# Patient Record
Sex: FEMALE | Race: WHITE | NOT HISPANIC OR LATINO | Employment: OTHER | URBAN - METROPOLITAN AREA
[De-identification: names, ages, dates, MRNs, and addresses within clinical notes are randomized per-mention and may not be internally consistent; named-entity substitution may affect disease eponyms.]

---

## 2019-03-22 ENCOUNTER — OFFICE VISIT (OUTPATIENT)
Dept: NEUROLOGY | Facility: CLINIC | Age: 69
End: 2019-03-22

## 2019-03-22 VITALS — OXYGEN SATURATION: 96 % | RESPIRATION RATE: 14 BRPM | HEART RATE: 66 BPM

## 2019-03-22 DIAGNOSIS — F03.90 DEMENTIA WITHOUT BEHAVIORAL DISTURBANCE, UNSPECIFIED DEMENTIA TYPE: Primary | ICD-10-CM

## 2019-03-22 PROCEDURE — 99204 OFFICE O/P NEW MOD 45 MIN: CPT | Performed by: PSYCHIATRY & NEUROLOGY

## 2019-03-22 RX ORDER — ROPINIROLE 1 MG/1
1 TABLET, FILM COATED ORAL NIGHTLY
COMMUNITY

## 2019-03-22 RX ORDER — ASCORBIC ACID 500 MG
500 TABLET ORAL DAILY
COMMUNITY

## 2019-03-22 RX ORDER — ALLOPURINOL 100 MG/1
100 TABLET ORAL DAILY
COMMUNITY

## 2019-03-22 RX ORDER — OMEPRAZOLE 40 MG/1
40 CAPSULE, DELAYED RELEASE ORAL DAILY
COMMUNITY

## 2019-03-22 RX ORDER — LEVOTHYROXINE SODIUM 0.07 MG/1
75 TABLET ORAL DAILY
COMMUNITY

## 2019-03-22 RX ORDER — CARVEDILOL 12.5 MG/1
12.5 TABLET ORAL 2 TIMES DAILY WITH MEALS
COMMUNITY

## 2019-03-22 RX ORDER — DONEPEZIL HYDROCHLORIDE 10 MG/1
10 TABLET, FILM COATED ORAL NIGHTLY
COMMUNITY

## 2019-03-22 RX ORDER — MEMANTINE HYDROCHLORIDE 5 MG/1
5 TABLET ORAL 2 TIMES DAILY
COMMUNITY
End: 2019-03-22 | Stop reason: SDUPTHER

## 2019-03-22 RX ORDER — MELATONIN
1000 DAILY
COMMUNITY

## 2019-03-22 RX ORDER — MEMANTINE HYDROCHLORIDE 10 MG/1
10 TABLET ORAL 2 TIMES DAILY
Qty: 60 TABLET | Refills: 6 | Status: SHIPPED | OUTPATIENT
Start: 2019-03-22 | End: 2020-01-02

## 2019-03-22 RX ORDER — AMLODIPINE BESYLATE 2.5 MG/1
2.5 TABLET ORAL DAILY
COMMUNITY

## 2019-03-22 RX ORDER — CHLORAL HYDRATE 500 MG
1000 CAPSULE ORAL
COMMUNITY

## 2019-03-22 RX ORDER — FERROUS SULFATE 325(65) MG
325 TABLET ORAL
COMMUNITY

## 2019-03-22 RX ORDER — ATORVASTATIN CALCIUM 10 MG/1
10 TABLET, FILM COATED ORAL DAILY
COMMUNITY

## 2019-03-22 RX ORDER — ASPIRIN 325 MG
325 TABLET, DELAYED RELEASE (ENTERIC COATED) ORAL EVERY 6 HOURS PRN
COMMUNITY

## 2019-03-22 RX ORDER — ARIPIPRAZOLE 5 MG/1
5 TABLET ORAL DAILY
COMMUNITY

## 2019-03-22 RX ORDER — CHOLECALCIFEROL (VITAMIN D3) 125 MCG
5 CAPSULE ORAL
COMMUNITY

## 2019-03-22 NOTE — PROGRESS NOTES
Subjective     CC: Memory Loss      History of Present Illness   Rolanda Damon is a 68 y.o. female who comes to clinic today for evaluation of dementia. Her family has noted a progressive cognitive decline since at least 2016, marked by forgetfulness along with impairments in essentially all spheres of cognition and complicating delusions. She does better when busy and her confusion is worsened at night.     Prior evaluation has included screening blood work  and an MRI of the brain which were primarily notable for diffuse white matter changes. She is currently taking donepezil, memantine and Abilify.    I have reviewed and confirmed the past family, social and medical history as accurate on 3/22/19.     Review of Systems   Constitutional: Negative.    Respiratory: Negative.    Cardiovascular: Negative.    Gastrointestinal: Negative.    Genitourinary: Negative.    All other systems reviewed and are negative.      Objective   General appearance today is normal.   Peripheral pulses were present and symmetric.   The ophthalmoscopic exam today is unremarkable. The discs and posterior elements are unremarkable.    Pulse 66   Resp 14   SpO2 96%     Physical Exam   Neurological: She has normal strength. She has a normal Finger-Nose-Finger Test.   Reflex Scores:       Tricep reflexes are 1+ on the right side and 1+ on the left side.       Bicep reflexes are 1+ on the right side and 1+ on the left side.       Brachioradialis reflexes are 1+ on the right side and 1+ on the left side.       Patellar reflexes are 1+ on the right side and 1+ on the left side.       Achilles reflexes are 1+ on the right side and 1+ on the left side.  Psychiatric: Her speech is normal.        Neurologic Exam     Mental Status   Oriented to person.   Disoriented to place.   Disoriented to time.   Registration: recalls 3 of 3 objects. Recall of objects at 5 minutes: 0/3. Follows 3 step commands.   Attention: normal. Concentration: normal.   Speech:  speech is normal   Level of consciousness: alert  Knowledge: poor.   Able to name object. Able to read. Able to repeat. Able to write. Normal comprehension.     Cranial Nerves   Cranial nerves II through XII intact.     Motor Exam   Muscle bulk: normal  Overall muscle tone: normal    Strength   Strength 5/5 throughout.     Sensory Exam   Light touch normal.     Gait, Coordination, and Reflexes     Gait  Gait: (antalgic)    Coordination   Finger to nose coordination: normal    Reflexes   Right brachioradialis: 1+  Left brachioradialis: 1+  Right biceps: 1+  Left biceps: 1+  Right triceps: 1+  Left triceps: 1+  Right patellar: 1+  Left patellar: 1+  Right achilles: 1+  Left achilles: 1+      MMSE=15      Assessment/Plan   Rolanda was seen today for memory loss.    Diagnoses and all orders for this visit:    Dementia without behavioral disturbance, unspecified dementia type          DISCUSSION/SUMMARY    Rolanda Damon comes to clinic today for evaluation of Dementia . 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 Siegel, which was discussed. I discussed treatment options including increasing memantine to 10 mg bid or even increasing donepezil to 10 mg bid or switching to the Exelon patch. For now we will try increasing her memantine. She will then follow up in 3 months , or sooner if needed.     As part of this visit I reviewed outside records and obtained additional history from the family which is incorporated in the HPI. I reviewed records from Dr. Parish and [facility] confirming the history above. Please see above for additional details.

## 2019-07-12 ENCOUNTER — OFFICE VISIT (OUTPATIENT)
Dept: NEUROLOGY | Facility: CLINIC | Age: 69
End: 2019-07-12

## 2019-07-12 VITALS — HEIGHT: 62 IN | SYSTOLIC BLOOD PRESSURE: 116 MMHG | DIASTOLIC BLOOD PRESSURE: 64 MMHG

## 2019-07-12 DIAGNOSIS — F03.90 DEMENTIA WITHOUT BEHAVIORAL DISTURBANCE, UNSPECIFIED DEMENTIA TYPE: Primary | ICD-10-CM

## 2019-07-12 PROCEDURE — 99214 OFFICE O/P EST MOD 30 MIN: CPT | Performed by: PHYSICIAN ASSISTANT

## 2019-07-12 RX ORDER — BLOOD-GLUCOSE METER
1 KIT MISCELLANEOUS 3 TIMES DAILY
Refills: 5 | COMMUNITY
Start: 2019-04-18

## 2019-07-12 RX ORDER — CHLORAL HYDRATE 500 MG
CAPSULE ORAL
Refills: 5 | COMMUNITY
Start: 2019-06-25

## 2019-07-12 RX ORDER — LANOLIN ALCOHOL/MO/W.PET/CERES
CREAM (GRAM) TOPICAL
Refills: 5 | COMMUNITY
Start: 2019-04-04

## 2019-07-12 RX ORDER — GANCICLOVIR 1.5 MG/G
GEL OPHTHALMIC
COMMUNITY
Start: 2019-07-03

## 2019-07-12 RX ORDER — LANCETS 30 GAUGE
1 EACH MISCELLANEOUS 3 TIMES DAILY
Refills: 5 | COMMUNITY
Start: 2019-04-18

## 2019-07-12 RX ORDER — ERGOCALCIFEROL 1.25 MG/1
CAPSULE ORAL
Refills: 5 | COMMUNITY
Start: 2019-06-25

## 2019-07-12 RX ORDER — LINAGLIPTIN 5 MG/1
TABLET, FILM COATED ORAL
Refills: 4 | COMMUNITY
Start: 2019-06-25

## 2019-07-12 RX ORDER — ASPIRIN 325 MG
TABLET ORAL
Refills: 5 | COMMUNITY
Start: 2019-06-25 | End: 2019-07-12 | Stop reason: SDUPTHER

## 2019-07-12 NOTE — PROGRESS NOTES
"Subjective     Chief Complaint: memory loss      History of Present Illness   Rolanda Damon is a 68 y.o. female who returns to clinic today for evaluation of Dementia . Her family has noted a progressive cognitive decline since at least 2016, marked by forgetfulness along with impairments in essentially all spheres of cognition and complicating delusions. She does better when busy and her confusion is worsened at night. She is currently residing at [facility].      Prior evaluation has included screening blood work and an MRI of the brain which were primarily notable for diffuse white matter changes. She is currently taking donepezil, memantine     Today: Since her last visit in 3/19, she and her family have noted a cognitive decline. Her family also notes that she has gradually begun dragging her right foot while walking over the last 2-3 weeks. There is no clear associated weakness or additional associated neurologic symptoms.       I have reviewed and confirmed the past family, social and medical history as accurate on 7/12/19.     Review of Systems   Constitutional: Negative.    HENT: Negative.    Eyes: Negative.    Respiratory: Negative.    Cardiovascular: Negative.    Gastrointestinal: Negative.    Endocrine: Negative.    Genitourinary: Negative.    Musculoskeletal: Negative.    Skin: Negative.    Allergic/Immunologic: Negative.    Neurological:        Memory loss     Hematological: Negative.    Psychiatric/Behavioral: Negative.        Objective     /64   Ht 157.5 cm (62\")     General appearance today is normal.       Physical Exam   Neurological: She has normal strength. She has a normal Finger-Nose-Finger Test.   Psychiatric: Her speech is normal.        Neurologic Exam     Mental Status   Oriented to person.   Disoriented to place.   Disoriented to time. Oriented to season.   Registration: recalls 3 of 3 objects. Recall of objects at 5 minutes: 0/3 recall. Follows 3 step commands.   Attention: " 3/5 sequencing.   Speech: speech is normal   Level of consciousness: alert  Able to name object. Able to read. Able to repeat. Able to write. Normal comprehension.     Cranial Nerves   Cranial nerves II through XII intact.     Motor Exam   Muscle bulk: normal  Overall muscle tone: normal    Strength   Strength 5/5 throughout.     Sensory Exam   Light touch normal.     Gait, Coordination, and Reflexes     Gait  Gait: (quite unsteady)    Coordination   Finger to nose coordination: normal    Tremor   Resting tremor: absent        Results  MMSE=16      Assessment/Plan   Rolanda was seen today for memory loss.    Diagnoses and all orders for this visit:    Dementia without behavioral disturbance, unspecified dementia type          Discussion/Summary   Rolanda Damon returns to clinic today with a history of suspected VaD or a mixed dementia (VaD/AD). This was discussed in detail with the patient and her family. After discussing potential treatment options, it was elected to continue on  donepezil and memantine unchanged. She and her family will consider PT in the future. I also encouraged the patient's family to call Margo Soria, social work as needed as they are looking into a memory care facility. She will then follow up in 6 months , or sooner if needed.   I spent 25 minutes face to face with the patient and family with 20 minutes spent on discussing diagnosis, prognosis, evaluation, current status, treatment options and management as discussed above.       As part of this visit I obtained additional history from the family which is incorporated in the HPI.      Fern Velázquez PA-C

## 2020-01-02 RX ORDER — MEMANTINE HYDROCHLORIDE 10 MG/1
TABLET ORAL
Qty: 60 TABLET | Refills: 6 | Status: SHIPPED | OUTPATIENT
Start: 2020-01-02 | End: 2020-08-13

## 2020-01-02 RX ORDER — MEMANTINE HYDROCHLORIDE 10 MG/1
TABLET ORAL
Qty: 60 TABLET | Refills: 6 | Status: SHIPPED | OUTPATIENT
Start: 2020-01-02 | End: 2020-01-02

## 2020-08-13 RX ORDER — MEMANTINE HYDROCHLORIDE 10 MG/1
TABLET ORAL
Qty: 60 TABLET | Refills: 6 | Status: SHIPPED | OUTPATIENT
Start: 2020-08-13 | End: 2021-04-23 | Stop reason: SDUPTHER

## 2020-08-13 NOTE — TELEPHONE ENCOUNTER
Called and spoke to pt daughter Augusta informing that pt is needing fu visit. Augusta informed that pt is at [facility].     Called and spoke to Alibna with [facility] ([phone]). Scheduled pt fu appt [email or web address] and informed Albina of instructions of how to start visit and that pt refill will be sent to [facility] pharmacy. Albina verbalized understanding.  Thanks.

## 2020-08-28 ENCOUNTER — TELEMEDICINE (OUTPATIENT)
Dept: NEUROLOGY | Facility: CLINIC | Age: 70
End: 2020-08-28

## 2020-08-28 DIAGNOSIS — F03.90 DEMENTIA WITHOUT BEHAVIORAL DISTURBANCE, UNSPECIFIED DEMENTIA TYPE: Primary | ICD-10-CM

## 2020-08-28 PROCEDURE — 99214 OFFICE O/P EST MOD 30 MIN: CPT | Performed by: PHYSICIAN ASSISTANT

## 2020-08-28 NOTE — PROGRESS NOTES
Subjective       You have chosen to receive care through a telehealth visit.  Do you consent to use a video/audio connection for your medical care today? Yes      Chief Complaint: memory loss      History of Present Illness   Rolanda Damon is a 69 y.o. female who returns today for evaluation of Dementia. Her family has noted a progressive cognitive decline since at least 2016, marked by forgetfulness along with impairments in essentially all spheres of cognition and complicating delusions. She does better when busy and her confusion is worsened at night. She is currently residing at [facility].      Prior evaluation has included screening blood work and an MRI of the brain which were primarily notable for diffuse white matter changes. She is currently taking donepezil and memantine.     Today: Since her last visit in 7/19, she feels essentially unchanged and her family agrees. Her delusions are manageable. She has been noting neck pain for several months. She is currently participating in PT.       I have reviewed and confirmed the past family, social and medical history as accurate on 8/28/2020.     Review of Systems   Constitutional: Negative.    HENT: Negative.    Eyes: Negative.    Respiratory: Negative.    Cardiovascular: Negative.    Gastrointestinal: Negative.    Endocrine: Negative.    Genitourinary: Negative.    Musculoskeletal: Negative.    Skin: Negative.    Allergic/Immunologic: Negative.    Neurological:        Memory loss     Hematological: Negative.        Objective       Physical Exam   Psychiatric: Her speech is normal.        Neurologic Exam     Mental Status   Speech: speech is normal   Level of consciousness: alert  Normal comprehension.         Results  MMSE= 16 in 7/19       Assessment/Plan   Diagnoses and all orders for this visit:    Dementia without behavioral disturbance, unspecified dementia type (CMS/HCC)          Discussion/Summary   Rolanda Damon returns today with a history of  suspected VaD or a mixed dementia (VaD/AD). I again reviewed her current status and treatment options. After discussing potential treatment options, it was elected to continue on  donepezil and memantine unchanged. She will continue working with PT regarding her neck pain. She will then follow up in 6 months , or sooner if needed.   I spent 25 minutes on the phone with the patient, family and caregiver with 15 minutes spent on discussing diagnosis, prognosis, evaluation, current status, treatment options and management as discussed above.     This visit was performed over the phone after Doxy.me failed, with patient's consent.     As part of this visit I obtained additional history from the family which is incorporated in the HPI and obtained additional history from the caregiver which is incorporated in the HPI.      Fern Velázquez PA-C

## 2021-04-23 DIAGNOSIS — F03.90 DEMENTIA WITHOUT BEHAVIORAL DISTURBANCE, UNSPECIFIED DEMENTIA TYPE: Primary | ICD-10-CM

## 2021-04-23 RX ORDER — MEMANTINE HYDROCHLORIDE 10 MG/1
10 TABLET ORAL 2 TIMES DAILY
Qty: 60 TABLET | Refills: 6 | Status: SHIPPED | OUTPATIENT
Start: 2021-04-23

## 2021-04-23 NOTE — TELEPHONE ENCOUNTER
Caller: [facility]   Relationship: PHARMACY     Best call back number: [phone] [phone]    Medication needed:   Requested Prescriptions     Pending Prescriptions Disp Refills   • memantine (NAMENDA) 10 MG tablet 60 tablet 6     Sig: Take 1 tablet by mouth 2 (Two) Times a Day.       Does the patient have less than a 3 day supply:  [] Yes  [x] No    What is the patient's preferred pharmacy:   [facility]

## 2021-11-05 DIAGNOSIS — F03.90 DEMENTIA WITHOUT BEHAVIORAL DISTURBANCE, UNSPECIFIED DEMENTIA TYPE: ICD-10-CM

## 2021-11-05 RX ORDER — MEMANTINE HYDROCHLORIDE 10 MG/1
TABLET ORAL
Qty: 56 TABLET | Refills: 11 | OUTPATIENT
Start: 2021-11-05

## 2021-12-06 DIAGNOSIS — F03.90 DEMENTIA WITHOUT BEHAVIORAL DISTURBANCE, UNSPECIFIED DEMENTIA TYPE: ICD-10-CM

## 2021-12-06 RX ORDER — MEMANTINE HYDROCHLORIDE 10 MG/1
TABLET ORAL
Qty: 56 TABLET | Refills: 11 | OUTPATIENT
Start: 2021-12-06

## 2023-01-09 ENCOUNTER — APPOINTMENT (OUTPATIENT)
Dept: CT IMAGING | Facility: HOSPITAL | Age: 73
End: 2023-01-09
Payer: MEDICARE

## 2023-01-09 ENCOUNTER — HOSPITAL ENCOUNTER (EMERGENCY)
Facility: HOSPITAL | Age: 73
Discharge: NURSING, REHAB OR LONG-TERM CARE | End: 2023-01-09
Attending: EMERGENCY MEDICINE | Admitting: EMERGENCY MEDICINE
Payer: MEDICARE

## 2023-01-09 VITALS
HEART RATE: 81 BPM | RESPIRATION RATE: 14 BRPM | OXYGEN SATURATION: 92 % | WEIGHT: 120 LBS | TEMPERATURE: 97.9 F | DIASTOLIC BLOOD PRESSURE: 72 MMHG | HEIGHT: 62 IN | BODY MASS INDEX: 22.08 KG/M2 | SYSTOLIC BLOOD PRESSURE: 130 MMHG

## 2023-01-09 DIAGNOSIS — S09.90XA INJURY OF HEAD, INITIAL ENCOUNTER: Primary | ICD-10-CM

## 2023-01-09 DIAGNOSIS — S01.81XA LACERATION OF FOREHEAD, INITIAL ENCOUNTER: ICD-10-CM

## 2023-01-09 DIAGNOSIS — F03.C0 SEVERE DEMENTIA WITHOUT BEHAVIORAL DISTURBANCE, PSYCHOTIC DISTURBANCE, MOOD DISTURBANCE, OR ANXIETY, UNSPECIFIED DEMENTIA TYPE: ICD-10-CM

## 2023-01-09 PROCEDURE — 99284 EMERGENCY DEPT VISIT MOD MDM: CPT

## 2023-01-09 PROCEDURE — 70450 CT HEAD/BRAIN W/O DYE: CPT

## 2023-01-09 PROCEDURE — 72125 CT NECK SPINE W/O DYE: CPT

## 2023-01-09 PROCEDURE — 99283 EMERGENCY DEPT VISIT LOW MDM: CPT

## 2023-01-09 RX ORDER — LIDOCAINE HYDROCHLORIDE AND EPINEPHRINE 10; 10 MG/ML; UG/ML
10 INJECTION, SOLUTION INFILTRATION; PERINEURAL ONCE
Status: DISCONTINUED | OUTPATIENT
Start: 2023-01-09 | End: 2023-01-09

## 2023-01-09 NOTE — ED PROVIDER NOTES
Subjective   History of Present Illness    Pt presents with injuries from a fall. By report from NH she fell asleep in her wheelchair and fell forward striking head on the floor.  She has a laceration to the forehead.    Daughter in law is here with her, pt has severe dementia at baseline and is not able to participate in history.  Pt complains of neck pain but DIL says that is a longstanding complaint.  Pt denies other pain including headache, denies pain to arms or legs but I do not think her answers are reliable.  She does not know where she is.    She is not on a blood thinner. She has had numerous falls in the past year or two with fractures to both hips and right shoulder and a previous head injury.    History provided by:  Relative  History limited by:  Dementia      Review of Systems   Unable to perform ROS: Dementia       Past Medical History:   Diagnosis Date   • Breast cancer (HCC)    • Dementia (HCC)    • TIA (transient ischemic attack)        No Known Allergies    History reviewed. No pertinent surgical history.    Family History   Problem Relation Age of Onset   • Alzheimer's disease Father    • Hypertension Father    • Stroke Father        Social History     Socioeconomic History   • Marital status:    Tobacco Use   • Smoking status: Never   Substance and Sexual Activity   • Alcohol use: No           Objective   Physical Exam  Vitals and nursing note reviewed.   Constitutional:       General: She is not in acute distress.     Appearance: Normal appearance. She is not ill-appearing.   HENT:      Head: Normocephalic.      Comments: No scalp tenderness or swelling or deformity.  There is a 3 cm horizontal laceration to the forehead above the left eyebrow.  No other injuries seen.  Eyes:      General: No scleral icterus.        Right eye: No discharge.         Left eye: No discharge.      Conjunctiva/sclera: Conjunctivae normal.   Neck:      Comments: C-collar maintained pending  imaging.  Cardiovascular:      Rate and Rhythm: Normal rate.   Pulmonary:      Effort: Pulmonary effort is normal. No respiratory distress.   Musculoskeletal:         General: No swelling or deformity.      Comments: There is no tenderness to gross palpation of the arms or legs, and intact painless ROM to the shoulders, elbows, wrists, hips, knees and ankles.     Skin:     General: Skin is dry.      Findings: No rash.   Neurological:      General: No focal deficit present.      Mental Status: She is alert. Mental status is at baseline.      Comments: Demented. Speech is confused but intelligible.  Moves all fours.   Psychiatric:         Mood and Affect: Mood normal.         Behavior: Behavior normal.         Thought Content: Thought content normal.         Laceration Repair    Date/Time: 1/9/2023 8:06 PM  Performed by: Gabriel Gaston MD  Authorized by: Gabriel Gaston MD     Consent:     Consent obtained:  Verbal    Consent given by:  Patient and guardian    Risks, benefits, and alternatives were discussed: yes      Risks discussed:  Pain  Universal protocol:     Procedure explained and questions answered to patient or proxy's satisfaction: yes      Patient identity confirmed:  Verbally with patient  Anesthesia:     Anesthesia method:  Local infiltration    Local anesthetic:  Lidocaine 1% WITH epi  Laceration details:     Location:  Face    Face location:  Forehead    Length (cm):  3  Pre-procedure details:     Preparation:  Imaging obtained to evaluate for foreign bodies  Exploration:     Hemostasis achieved with:  Epinephrine and direct pressure    Imaging outcome: foreign body not noted      Wound exploration: entire depth of wound visualized      Wound extent: no fascia violation noted, no underlying fracture noted and no vascular damage noted      Contaminated: no    Treatment:     Area cleansed with:  Povidone-iodine    Amount of cleaning:  Standard    Visualized foreign bodies/material removed:  no      Debridement:  None    Undermining:  None    Scar revision: no    Skin repair:     Repair method:  Sutures    Suture size:  5-0    Suture material:  Prolene    Suture technique:  Simple interrupted    Number of sutures:  5  Approximation:     Approximation:  Close  Post-procedure details:     Dressing:  Non-adherent dressing    Procedure completion:  Tolerated               ED Course         CT imaging negative. Wound repaired as above. Patient stable on serial rechecks.  Discussed findings, concerns, plan of care, expected course, reasons to return and followup with relative.  Provided the opportunity to ask questions.                                    Medical Decision Making  Injury of head, initial encounter: acute illness or injury  Laceration of forehead, initial encounter: acute illness or injury  Severe dementia without behavioral disturbance, psychotic disturbance, mood disturbance, or anxiety, unspecified dementia type: chronic illness or injury  Amount and/or Complexity of Data Reviewed  Independent Historian: caregiver     Details: Relative provides history and background secondary to severe dementia.  Radiology: ordered. Decision-making details documented in ED Course.          Final diagnoses:   Injury of head, initial encounter   Laceration of forehead, initial encounter   Severe dementia without behavioral disturbance, psychotic disturbance, mood disturbance, or anxiety, unspecified dementia type       ED Disposition  ED Disposition     ED Disposition   Discharge    Condition   Stable    Comment   --             Shayna Og, APRN  [address] [facility] [address]  [place] [address]  [phone]    In 1 week           Medication List      No changes were made to your prescriptions during this visit.          Gabriel Gaston MD  01/09/23 2008

## 2024-04-24 ENCOUNTER — APPOINTMENT (OUTPATIENT)
Dept: CT IMAGING | Facility: HOSPITAL | Age: 74
DRG: 065 | End: 2024-04-24
Payer: MEDICARE

## 2024-04-24 ENCOUNTER — APPOINTMENT (OUTPATIENT)
Dept: GENERAL RADIOLOGY | Facility: HOSPITAL | Age: 74
DRG: 065 | End: 2024-04-24
Payer: MEDICARE

## 2024-04-24 ENCOUNTER — HOSPITAL ENCOUNTER (INPATIENT)
Facility: HOSPITAL | Age: 74
LOS: 2 days | Discharge: NURSING, REHAB OR LONG-TERM CARE | DRG: 065 | End: 2024-04-28
Attending: EMERGENCY MEDICINE | Admitting: INTERNAL MEDICINE
Payer: MEDICARE

## 2024-04-24 DIAGNOSIS — R47.1 DYSARTHRIA: ICD-10-CM

## 2024-04-24 DIAGNOSIS — R13.12 OROPHARYNGEAL DYSPHAGIA: ICD-10-CM

## 2024-04-24 DIAGNOSIS — R41.82 ALTERED MENTAL STATUS, UNSPECIFIED ALTERED MENTAL STATUS TYPE: Primary | ICD-10-CM

## 2024-04-24 DIAGNOSIS — R41.841 COGNITIVE COMMUNICATION DEFICIT: ICD-10-CM

## 2024-04-24 PROBLEM — Z85.3 HISTORY OF BREAST CANCER: Status: ACTIVE | Noted: 2024-04-24

## 2024-04-24 PROBLEM — Z86.73 PERSONAL HISTORY OF TRANSIENT ISCHEMIC ATTACK (TIA), AND CEREBRAL INFARCTION WITHOUT RESIDUAL DEFICITS: Status: ACTIVE | Noted: 2024-04-24

## 2024-04-24 PROBLEM — R29.810 FACIAL DROOP: Status: ACTIVE | Noted: 2024-04-24

## 2024-04-24 PROBLEM — E11.9 TYPE 2 DIABETES MELLITUS: Status: ACTIVE | Noted: 2024-04-24

## 2024-04-24 PROBLEM — D69.6 THROMBOCYTOPENIA: Status: ACTIVE | Noted: 2024-04-24

## 2024-04-24 LAB
ALBUMIN SERPL-MCNC: 3.9 G/DL (ref 3.5–5.2)
ALBUMIN/GLOB SERPL: 1.3 G/DL
ALP SERPL-CCNC: 82 U/L (ref 39–117)
ALT SERPL W P-5'-P-CCNC: 14 U/L (ref 1–33)
ALT SERPL W P-5'-P-CCNC: 14 U/L (ref 1–33)
ANION GAP SERPL CALCULATED.3IONS-SCNC: 8 MMOL/L (ref 5–15)
APTT PPP: 31.5 SECONDS (ref 22–39)
AST SERPL-CCNC: 18 U/L (ref 1–32)
AST SERPL-CCNC: 18 U/L (ref 1–32)
BASOPHILS # BLD AUTO: 0.02 10*3/MM3 (ref 0–0.2)
BASOPHILS NFR BLD AUTO: 0.3 % (ref 0–1.5)
BILIRUB SERPL-MCNC: 0.7 MG/DL (ref 0–1.2)
BUN BLDA-MCNC: 34 MG/DL (ref 8–26)
BUN SERPL-MCNC: 32 MG/DL (ref 8–23)
BUN/CREAT SERPL: 38.1 (ref 7–25)
CA-I BLDA-SCNC: 1.25 MMOL/L (ref 1.2–1.32)
CALCIUM SPEC-SCNC: 9.6 MG/DL (ref 8.6–10.5)
CHLORIDE BLDA-SCNC: 100 MMOL/L (ref 98–109)
CHLORIDE SERPL-SCNC: 101 MMOL/L (ref 98–107)
CO2 BLDA-SCNC: 29 MMOL/L (ref 24–29)
CO2 SERPL-SCNC: 32 MMOL/L (ref 22–29)
CREAT BLDA-MCNC: 0.9 MG/DL (ref 0.6–1.3)
CREAT SERPL-MCNC: 0.84 MG/DL (ref 0.57–1)
DEPRECATED RDW RBC AUTO: 44.5 FL (ref 37–54)
EGFRCR SERPLBLD CKD-EPI 2021: 66.8 ML/MIN/1.73
EGFRCR SERPLBLD CKD-EPI 2021: 72.6 ML/MIN/1.73
EOSINOPHIL # BLD AUTO: 0.07 10*3/MM3 (ref 0–0.4)
EOSINOPHIL NFR BLD AUTO: 1.2 % (ref 0.3–6.2)
ERYTHROCYTE [DISTWIDTH] IN BLOOD BY AUTOMATED COUNT: 13.3 % (ref 12.3–15.4)
GLOBULIN UR ELPH-MCNC: 2.9 GM/DL
GLUCOSE BLDC GLUCOMTR-MCNC: 141 MG/DL (ref 70–130)
GLUCOSE SERPL-MCNC: 139 MG/DL (ref 65–99)
HCT VFR BLD AUTO: 45.3 % (ref 34–46.6)
HCT VFR BLDA CALC: 45 % (ref 38–51)
HGB BLD-MCNC: 14.9 G/DL (ref 12–15.9)
HGB BLDA-MCNC: 15.3 G/DL (ref 12–17)
HOLD SPECIMEN: NORMAL
IMM GRANULOCYTES # BLD AUTO: 0.03 10*3/MM3 (ref 0–0.05)
IMM GRANULOCYTES NFR BLD AUTO: 0.5 % (ref 0–0.5)
LYMPHOCYTES # BLD AUTO: 1.32 10*3/MM3 (ref 0.7–3.1)
LYMPHOCYTES NFR BLD AUTO: 22.6 % (ref 19.6–45.3)
MCH RBC QN AUTO: 29.8 PG (ref 26.6–33)
MCHC RBC AUTO-ENTMCNC: 32.9 G/DL (ref 31.5–35.7)
MCV RBC AUTO: 90.6 FL (ref 79–97)
MONOCYTES # BLD AUTO: 0.34 10*3/MM3 (ref 0.1–0.9)
MONOCYTES NFR BLD AUTO: 5.8 % (ref 5–12)
NEUTROPHILS NFR BLD AUTO: 4.05 10*3/MM3 (ref 1.7–7)
NEUTROPHILS NFR BLD AUTO: 69.6 % (ref 42.7–76)
NRBC BLD AUTO-RTO: 0 /100 WBC (ref 0–0.2)
PLATELET # BLD AUTO: 117 10*3/MM3 (ref 140–450)
PMV BLD AUTO: 11 FL (ref 6–12)
POTASSIUM BLDA-SCNC: 3.8 MMOL/L (ref 3.5–4.9)
POTASSIUM SERPL-SCNC: 4 MMOL/L (ref 3.5–5.2)
PROT SERPL-MCNC: 6.8 G/DL (ref 6–8.5)
QT INTERVAL: 398 MS
QTC INTERVAL: 453 MS
RBC # BLD AUTO: 5 10*6/MM3 (ref 3.77–5.28)
SODIUM BLD-SCNC: 141 MMOL/L (ref 138–146)
SODIUM SERPL-SCNC: 141 MMOL/L (ref 136–145)
TROPONIN T SERPL HS-MCNC: 27 NG/L
TSH SERPL DL<=0.05 MIU/L-ACNC: 4.53 UIU/ML (ref 0.27–4.2)
WBC NRBC COR # BLD AUTO: 5.83 10*3/MM3 (ref 3.4–10.8)
WHOLE BLOOD HOLD COAG: NORMAL
WHOLE BLOOD HOLD SPECIMEN: NORMAL

## 2024-04-24 PROCEDURE — 85014 HEMATOCRIT: CPT

## 2024-04-24 PROCEDURE — 70450 CT HEAD/BRAIN W/O DYE: CPT

## 2024-04-24 PROCEDURE — G0378 HOSPITAL OBSERVATION PER HR: HCPCS

## 2024-04-24 PROCEDURE — 80053 COMPREHEN METABOLIC PANEL: CPT | Performed by: EMERGENCY MEDICINE

## 2024-04-24 PROCEDURE — 99291 CRITICAL CARE FIRST HOUR: CPT

## 2024-04-24 PROCEDURE — 99223 1ST HOSP IP/OBS HIGH 75: CPT | Performed by: NURSE PRACTITIONER

## 2024-04-24 PROCEDURE — 99222 1ST HOSP IP/OBS MODERATE 55: CPT | Performed by: INTERNAL MEDICINE

## 2024-04-24 PROCEDURE — 85730 THROMBOPLASTIN TIME PARTIAL: CPT | Performed by: EMERGENCY MEDICINE

## 2024-04-24 PROCEDURE — 70496 CT ANGIOGRAPHY HEAD: CPT

## 2024-04-24 PROCEDURE — 36415 COLL VENOUS BLD VENIPUNCTURE: CPT

## 2024-04-24 PROCEDURE — 85025 COMPLETE CBC W/AUTO DIFF WBC: CPT | Performed by: EMERGENCY MEDICINE

## 2024-04-24 PROCEDURE — 84443 ASSAY THYROID STIM HORMONE: CPT | Performed by: INTERNAL MEDICINE

## 2024-04-24 PROCEDURE — 80047 BASIC METABLC PNL IONIZED CA: CPT

## 2024-04-24 PROCEDURE — 71045 X-RAY EXAM CHEST 1 VIEW: CPT

## 2024-04-24 PROCEDURE — 70498 CT ANGIOGRAPHY NECK: CPT

## 2024-04-24 PROCEDURE — 25510000001 IOPAMIDOL PER 1 ML: Performed by: EMERGENCY MEDICINE

## 2024-04-24 PROCEDURE — 93005 ELECTROCARDIOGRAM TRACING: CPT | Performed by: EMERGENCY MEDICINE

## 2024-04-24 PROCEDURE — 84484 ASSAY OF TROPONIN QUANT: CPT | Performed by: EMERGENCY MEDICINE

## 2024-04-24 PROCEDURE — 4A03X5D MEASUREMENT OF ARTERIAL FLOW, INTRACRANIAL, EXTERNAL APPROACH: ICD-10-PCS | Performed by: STUDENT IN AN ORGANIZED HEALTH CARE EDUCATION/TRAINING PROGRAM

## 2024-04-24 RX ORDER — SODIUM CHLORIDE 0.9 % (FLUSH) 0.9 %
10 SYRINGE (ML) INJECTION AS NEEDED
Status: DISCONTINUED | OUTPATIENT
Start: 2024-04-24 | End: 2024-04-28 | Stop reason: HOSPADM

## 2024-04-24 RX ORDER — ACETAMINOPHEN 325 MG/1
650 TABLET ORAL EVERY 4 HOURS PRN
Status: DISCONTINUED | OUTPATIENT
Start: 2024-04-24 | End: 2024-04-28 | Stop reason: HOSPADM

## 2024-04-24 RX ORDER — ATORVASTATIN CALCIUM 40 MG/1
40 TABLET, FILM COATED ORAL NIGHTLY
Status: DISCONTINUED | OUTPATIENT
Start: 2024-04-24 | End: 2024-04-28 | Stop reason: HOSPADM

## 2024-04-24 RX ORDER — POLYETHYLENE GLYCOL 3350 17 G/17G
17 POWDER, FOR SOLUTION ORAL DAILY PRN
Status: DISCONTINUED | OUTPATIENT
Start: 2024-04-24 | End: 2024-04-28 | Stop reason: HOSPADM

## 2024-04-24 RX ORDER — ASPIRIN 300 MG/1
300 SUPPOSITORY RECTAL DAILY
Status: DISCONTINUED | OUTPATIENT
Start: 2024-04-25 | End: 2024-04-28 | Stop reason: HOSPADM

## 2024-04-24 RX ORDER — NITROGLYCERIN 0.4 MG/1
0.4 TABLET SUBLINGUAL
Status: DISCONTINUED | OUTPATIENT
Start: 2024-04-24 | End: 2024-04-28 | Stop reason: HOSPADM

## 2024-04-24 RX ORDER — TRAZODONE HYDROCHLORIDE 50 MG/1
25 TABLET ORAL NIGHTLY
Status: DISCONTINUED | OUTPATIENT
Start: 2024-04-24 | End: 2024-04-28 | Stop reason: HOSPADM

## 2024-04-24 RX ORDER — ACETAMINOPHEN 160 MG/5ML
650 SOLUTION ORAL EVERY 4 HOURS PRN
Status: DISCONTINUED | OUTPATIENT
Start: 2024-04-24 | End: 2024-04-28 | Stop reason: HOSPADM

## 2024-04-24 RX ORDER — ASPIRIN 325 MG
325 TABLET ORAL DAILY
Status: DISCONTINUED | OUTPATIENT
Start: 2024-04-25 | End: 2024-04-28 | Stop reason: HOSPADM

## 2024-04-24 RX ORDER — SODIUM CHLORIDE 0.9 % (FLUSH) 0.9 %
10 SYRINGE (ML) INJECTION EVERY 12 HOURS SCHEDULED
Status: DISCONTINUED | OUTPATIENT
Start: 2024-04-24 | End: 2024-04-28 | Stop reason: HOSPADM

## 2024-04-24 RX ORDER — DONEPEZIL HYDROCHLORIDE 10 MG/1
5 TABLET, FILM COATED ORAL NIGHTLY
Status: DISCONTINUED | OUTPATIENT
Start: 2024-04-24 | End: 2024-04-28 | Stop reason: HOSPADM

## 2024-04-24 RX ORDER — AMOXICILLIN 250 MG
2 CAPSULE ORAL 2 TIMES DAILY PRN
Status: DISCONTINUED | OUTPATIENT
Start: 2024-04-24 | End: 2024-04-28 | Stop reason: HOSPADM

## 2024-04-24 RX ORDER — BISACODYL 5 MG/1
5 TABLET, DELAYED RELEASE ORAL DAILY PRN
Status: DISCONTINUED | OUTPATIENT
Start: 2024-04-24 | End: 2024-04-28 | Stop reason: HOSPADM

## 2024-04-24 RX ORDER — BISACODYL 10 MG
10 SUPPOSITORY, RECTAL RECTAL DAILY PRN
Status: DISCONTINUED | OUTPATIENT
Start: 2024-04-24 | End: 2024-04-28 | Stop reason: HOSPADM

## 2024-04-24 RX ORDER — ACETAMINOPHEN 650 MG/1
650 SUPPOSITORY RECTAL EVERY 4 HOURS PRN
Status: DISCONTINUED | OUTPATIENT
Start: 2024-04-24 | End: 2024-04-28 | Stop reason: HOSPADM

## 2024-04-24 RX ORDER — BUSPIRONE HYDROCHLORIDE 15 MG/1
7.5 TABLET ORAL EVERY 12 HOURS SCHEDULED
Status: DISCONTINUED | OUTPATIENT
Start: 2024-04-24 | End: 2024-04-28 | Stop reason: HOSPADM

## 2024-04-24 RX ORDER — SODIUM CHLORIDE 9 MG/ML
40 INJECTION, SOLUTION INTRAVENOUS AS NEEDED
Status: DISCONTINUED | OUTPATIENT
Start: 2024-04-24 | End: 2024-04-28 | Stop reason: HOSPADM

## 2024-04-24 RX ADMIN — IOPAMIDOL 75 ML: 755 INJECTION, SOLUTION INTRAVENOUS at 14:37

## 2024-04-24 RX ADMIN — Medication 10 ML: at 21:32

## 2024-04-24 NOTE — ED NOTES
Rolanda Damon    Nursing Report ED to Floor:  Mental status: A&O x 0  Ambulatory status: Bed Bound  Oxygen Therapy:  RA  Cardiac Rhythm: NSR  Admitted from: ER  Safety Concerns:  None  Social Issues: None  ED Room #:  8    ED Nurse Phone Extension - [phone] or may call [phone].      HPI:   Chief Complaint   Patient presents with    Altered Mental Status       Past Medical History:  Past Medical History:   Diagnosis Date    Breast cancer     Dementia     TIA (transient ischemic attack)         Past Surgical History:  No past surgical history on file.     Admitting Doctor:   Erika Verdugo DO    Consulting Provider(s):  Consults       Date and Time Order Name Status Description    4/24/2024  2:22 PM Inpatient Neurology Consult Stroke               Admitting Diagnosis:   The encounter diagnosis was Altered mental status, unspecified altered mental status type.    Most Recent Vitals:   Vitals:    04/24/24 1600 04/24/24 1614 04/24/24 1630 04/24/24 1645   BP: 126/76 128/75 151/94 143/97   BP Location:       Patient Position:       Pulse: 72 74 80 76   Resp:       SpO2: 95% 93% 95% 96%   Weight:       Height:           Active LDAs/IV Access:   Lines, Drains & Airways       Active LDAs       Name Placement date Placement time Site Days    Peripheral IV 04/24/24 1437 Left Antecubital 04/24/24  1437  Antecubital  less than 1                    Labs (abnormal labs have a star):   Labs Reviewed   SINGLE HS TROPONIN T - Abnormal; Notable for the following components:       Result Value    HS Troponin T 27 (*)     All other components within normal limits    Narrative:     High Sensitive Troponin T Reference Range:  <14.0 ng/L- Negative Female for AMI  <22.0 ng/L- Negative Male for AMI  >=14 - Abnormal Female indicating possible myocardial injury.  >=22 - Abnormal Male indicating possible myocardial injury.   Clinicians would have to utilize clinical acumen, EKG, Troponin, and serial changes to determine if it is an Acute Myocardial  Infarction or myocardial injury due to an underlying chronic condition.        CBC WITH AUTO DIFFERENTIAL - Abnormal; Notable for the following components:    Platelets 117 (*)     All other components within normal limits   COMPREHENSIVE METABOLIC PANEL - Abnormal; Notable for the following components:    Glucose 139 (*)     BUN 32 (*)     CO2 32.0 (*)     BUN/Creatinine Ratio 38.1 (*)     All other components within normal limits    Narrative:     GFR Normal >60  Chronic Kidney Disease <60  Kidney Failure <15    The GFR formula is only valid for adults with stable renal function between ages 18 and 70.   POCT CHEM 8 - Abnormal; Notable for the following components:    Glucose 141 (*)     BUN 34 (*)     All other components within normal limits   APTT - Normal    Narrative:     PTT = The equivalent PTT values for the therapeutic range of heparin levels at 0.3 to 0.5 U/ml are 60 to 70 seconds.   AST - Normal   ALT - Normal   RAINBOW DRAW    Narrative:     The following orders were created for panel order [place] Draw.  Procedure                               Abnormality         Status                     ---------                               -----------         ------                     Green Top (Gel)[[id number]]                                  Final result               Lavender Top[[id number]]                                     Final result               Gold Top - SST[[id number]]                                   Final result               Alberto Top[[id number]]                                         Final result               Light Blue Top[[id number]]                                   Final result                 Please view results for these tests on the individual orders.   URINALYSIS W/ MICROSCOPIC IF INDICATED (NO CULTURE)   POCT CHEM 8   POCT PROTIME - INR   CBC AND DIFFERENTIAL    Narrative:     The following orders were created for panel order CBC & Differential.  Procedure                                Abnormality         Status                     ---------                               -----------         ------                     CBC Auto Differential[[id number]]        Abnormal            Final result               Scan Slide[[id number]]                                                                    Please view results for these tests on the individual orders.   GREEN TOP   LAVENDER TOP   GOLD TOP - SST   GRAY TOP   LIGHT BLUE TOP       Meds Given in ED:   Medications   sodium chloride 0.9 % flush 10 mL (has no administration in time range)   iopamidol (ISOVUE-370) 76 % injection 100 mL (75 mL Intravenous Given 4/24/24 3947)           Last NIH score:                                                          Dysphagia screening results:        [place] Coma Scale:  No data recorded     CIWA:        Restraint Type:            Isolation Status:  No active isolations

## 2024-04-24 NOTE — ED PROVIDER NOTES
[place]    EMERGENCY DEPARTMENT ENCOUNTER      Pt Name: Rolanda Damon  MRN: [id number]  YOB: 1950  Date of evaluation: 4/24/2024  Provider: Jl Bravo DO    CHIEF COMPLAINT       Chief Complaint   Patient presents with    Altered Mental Status     HPI  Stated Reason for Visit: Pt here for AMS, right sided facial droop. LKN noon yesterday. Staff states that pt was responding slower than normal.     HISTORY OF PRESENT ILLNESS  (Location/Symptom, Timing/Onset, Context/Setting, Quality, Duration, Modifying Factors, Severity.)   Rolanda Damon is a 73 y.o. female who presents to the emergency department via EMS from her nursing facility as a code stroke.  The history is provided by EMS from the nursing staff is very limited as the patient has significant dementia at baseline.  The patient self denies any headache.  Nursing staff had noted that the patient was little slow with her responses compared to her normal baseline as she does have very significant dementia and I noticed that her right side of her face had been drooping which is abnormal for her in addition to leaning over to the right side.  Patient herself does not have any headache, denies any recent seizures.  EMS notes they were told the patient does have multiple TIAs, contractures at baseline.    Nursing notes were reviewed.      PAST MEDICAL HISTORY     Past Medical History:   Diagnosis Date    Breast cancer     Dementia     TIA (transient ischemic attack)          SURGICAL HISTORY     No past surgical history on file.      CURRENT MEDICATIONS       Current Facility-Administered Medications:     sodium chloride 0.9 % flush 10 mL, 10 mL, Intravenous, PRN, Shelly, Jl Trevino DO    Current Outpatient Medications:     allopurinol (ZYLOPRIM) 100 MG tablet, Take 100 mg by mouth Daily., Disp: , Rfl:     amLODIPine (NORVASC) 2.5 MG tablet, Take 2.5 mg by mouth Daily., Disp: , Rfl:     ARIPiprazole (ABILIFY) 5 MG tablet, Take 5 mg by  mouth Daily., Disp: , Rfl:     aspirin  MG tablet, Take 325 mg by mouth Every 6 (Six) Hours As Needed., Disp: , Rfl:     atorvastatin (LIPITOR) 10 MG tablet, Take 10 mg by mouth Daily., Disp: , Rfl:     carvedilol (COREG) 12.5 MG tablet, Take 12.5 mg by mouth 2 (Two) Times a Day With Meals., Disp: , Rfl:     cholecalciferol (VITAMIN D3) 1000 units tablet, Take 1,000 Units by mouth Daily., Disp: , Rfl:     donepezil (ARICEPT) 10 MG tablet, Take 10 mg by mouth Every Night., Disp: , Rfl:     EASYMAX TEST test strip, 1 each by Other route 3 (Three) Times a Day., Disp: , Rfl: 5    ferrous sulfate 325 (65 FE) MG tablet, Take 325 mg by mouth Daily With Breakfast., Disp: , Rfl:     insulin lispro protamine-insulin lispro (humaLOG 50-50) (50-50) 100 UNIT/ML suspension injection, Inject  under the skin into the appropriate area as directed 2 (Two) Times a Day With Meals., Disp: , Rfl:     Lancets misc, 1 each by Other route 3 (Three) Times a Day., Disp: , Rfl: 5    levothyroxine (SYNTHROID, LEVOTHROID) 75 MCG tablet, Take 75 mcg by mouth Daily., Disp: , Rfl:     Magnesium Oxide 400 (240 Mg) MG tablet, , Disp: , Rfl: 5    melatonin 5 MG tablet tablet, Take 5 mg by mouth., Disp: , Rfl:     memantine (NAMENDA) 10 MG tablet, Take 1 tablet by mouth 2 (Two) Times a Day., Disp: 60 tablet, Rfl: 6    Omega-3 Fatty Acids (FISH OIL) 1000 MG capsule capsule, Take 1,000 mg by mouth Daily With Breakfast., Disp: , Rfl:     Omega-3 Fatty Acids (FISH OIL) 1000 MG capsule capsule, , Disp: , Rfl: 5    omeprazole (priLOSEC) 40 MG capsule, Take 40 mg by mouth Daily., Disp: , Rfl:     rOPINIRole (REQUIP) 1 MG tablet, Take 1 mg by mouth Every Night. Take 1 hour before bedtime., Disp: , Rfl:     TRADJENTA 5 MG tablet tablet, , Disp: , Rfl: 4    vitamin C (ASCORBIC ACID) 500 MG tablet, Take 500 mg by mouth Daily., Disp: , Rfl:     vitamin D (ERGOCALCIFEROL) 28543 units capsule capsule, , Disp: , Rfl: 5    ZIRGAN 0.15 % gel ophthalmic gel, ,  Disp: , Rfl:     ALLERGIES     Amoxicillin and Darvon [propoxyphene]    FAMILY HISTORY       Family History   Problem Relation Age of Onset    Alzheimer's disease Father     Hypertension Father     Stroke Father           SOCIAL HISTORY       Social History     Socioeconomic History    Marital status:    Tobacco Use    Smoking status: Never   Substance and Sexual Activity    Alcohol use: No         PHYSICAL EXAM    (up to 7 for level 4, 8 or more for level 5)     Vitals:    04/24/24 1614 04/24/24 1630 04/24/24 1645 04/24/24 1700   BP: 128/75 151/94 143/97 130/88   BP Location:       Patient Position:       Pulse: 74 80 76 75   Resp:       Temp:    97.6 °F (36.4 °C)   TempSrc:    Axillary   SpO2: 93% 95% 96% 92%   Weight:       Height:           Physical Exam  General : Patient is awake, very limited in her verbal responses, one-word answers some of the time patient does have significant dementia baseline, appears chronically ill  HEENT: Pupils are equally round, EOMI, conjunctivae clear  Cardiac: Heart regular rate, rhythm  Lungs: Lungs decreased breath sounds bilaterally, no acute respiratory distress  Abdomen: Abdomen is soft, nontender, nondistended  Musculoskeletal: Significant muscle atrophy in bilateral upper and lower extremities, there are flexion contractures of the right upper extremity, lower legs have very minimal muscle tone  Neuro: The patient is awake, which she is limited in her responses, neurological examination is difficult to assess.  She does preferentially leaning to the right, is a very mild right-sided facial droop, unable to fully assess strength of the upper or lower extremities as she has significantly decreased muscle tone.  Please refer to stroke navigator for full NIH score.  Dermatology: Skin is warm and dry  Psych: Mentation is grossly normal, cognition is grossly normal. Affect is appropriate      DIAGNOSTIC RESULTS     EKG:  All EKGs are interpreted by the Emergency  Department Physician who either signs or Co-signs this chart in the absence of a cardiologist.    ECG 12 Lead ED Triage Standing Order; Acute Stroke (Onset <24 hrs)   Final Result   Test Reason : ED Triage Standing Order~   Blood Pressure :   */*   mmHG   Vent. Rate :  78 BPM     Atrial Rate :  78 BPM      P-R Int : 142 ms          QRS Dur :  86 ms       QT Int : 398 ms       P-R-T Axes :  30 -21   8 degrees      QTc Int : 453 ms      Normal sinus rhythm   Nonspecific ST and T wave abnormality   Abnormal ECG   No previous ECGs available   Confirmed by ABIGAIL HEMPHILL MD ([id number]) on 4/24/2024 3:21:01 PM      Referred By: EDMD           Confirmed By: ABIGAIL HEMPHILL MD          RADIOLOGY:     [x] Radiologist's Report Reviewed:  XR Chest 1 View   Final Result   Impression:   No acute cardiopulmonary abnormality.         Electronically Signed: James Montes MD     4/24/2024 3:21 PM EDT     Workstation ID: [id number]      CT Angiogram Head w AI Analysis of LVO   Final Result   Impression:   Essentially normal CTA of the head and neck.            Electronically Signed: Dayton Arthur MD     4/24/2024 2:59 PM EDT     Workstation ID: [id number]      CT Angiogram Neck   Final Result   Impression:   Essentially normal CTA of the head and neck.            Electronically Signed: Dayton Arthur MD     4/24/2024 2:59 PM EDT     Workstation ID: [id number]      CT Head Without Contrast Stroke Protocol   Final Result   Age-related changes of the brain as above, otherwise without evidence of acute intracranial abnormality.         Electronically Signed: Thiago Hernandez MD     4/24/2024 2:51 PM EDT     Workstation ID: [id number]          I ordered and independently reviewed the above noted radiographic studies.      I viewed images of CT head which showed age-related changes per my independent interpretation.    See radiologist's dictation for official interpretation.      ED BEDSIDE ULTRASOUND:   Performed by ED Physician - none    LABS:    I  have reviewed and interpreted all of the currently available lab results from this visit (if applicable):  Results for orders placed or performed during the hospital encounter of 04/24/24   Single High Sensitivity Troponin T    Specimen: Blood   Result Value Ref Range    HS Troponin T 27 (H) <14 ng/L   aPTT    Specimen: Blood   Result Value Ref Range    PTT 31.5 22.0 - 39.0 seconds   AST    Specimen: Blood   Result Value Ref Range    AST (SGOT) 18 1 - 32 U/L   ALT    Specimen: Blood   Result Value Ref Range    ALT (SGPT) 14 1 - 33 U/L   CBC Auto Differential    Specimen: Blood   Result Value Ref Range    WBC 5.83 3.40 - 10.80 10*3/mm3    RBC 5.00 3.77 - 5.28 10*6/mm3    Hemoglobin 14.9 12.0 - 15.9 g/dL    Hematocrit 45.3 34.0 - 46.6 %    MCV 90.6 79.0 - 97.0 fL    MCH 29.8 26.6 - 33.0 pg    MCHC 32.9 31.5 - 35.7 g/dL    RDW 13.3 12.3 - 15.4 %    RDW-SD 44.5 37.0 - 54.0 fl    MPV 11.0 6.0 - 12.0 fL    Platelets 117 (L) 140 - 450 10*3/mm3    Neutrophil % 69.6 42.7 - 76.0 %    Lymphocyte % 22.6 19.6 - 45.3 %    Monocyte % 5.8 5.0 - 12.0 %    Eosinophil % 1.2 0.3 - 6.2 %    Basophil % 0.3 0.0 - 1.5 %    Immature Grans % 0.5 0.0 - 0.5 %    Neutrophils, Absolute 4.05 1.70 - 7.00 10*3/mm3    Lymphocytes, Absolute 1.32 0.70 - 3.10 10*3/mm3    Monocytes, Absolute 0.34 0.10 - 0.90 10*3/mm3    Eosinophils, Absolute 0.07 0.00 - 0.40 10*3/mm3    Basophils, Absolute 0.02 0.00 - 0.20 10*3/mm3    Immature Grans, Absolute 0.03 0.00 - 0.05 10*3/mm3    nRBC 0.0 0.0 - 0.2 /100 WBC   Comprehensive Metabolic Panel    Specimen: Blood   Result Value Ref Range    Glucose 139 (H) 65 - 99 mg/dL    BUN 32 (H) 8 - 23 mg/dL    Creatinine 0.84 0.57 - 1.00 mg/dL    Sodium 141 136 - 145 mmol/L    Potassium 4.0 3.5 - 5.2 mmol/L    Chloride 101 98 - 107 mmol/L    CO2 32.0 (H) 22.0 - 29.0 mmol/L    Calcium 9.6 8.6 - 10.5 mg/dL    Total Protein 6.8 6.0 - 8.5 g/dL    Albumin 3.9 3.5 - 5.2 g/dL    ALT (SGPT) 14 1 - 33 U/L    AST (SGOT) 18 1 - 32 U/L     Alkaline Phosphatase 82 39 - 117 U/L    Total Bilirubin 0.7 0.0 - 1.2 mg/dL    Globulin 2.9 gm/dL    A/G Ratio 1.3 g/dL    BUN/Creatinine Ratio 38.1 (H) 7.0 - 25.0    Anion Gap 8.0 5.0 - 15.0 mmol/L    eGFR 72.6 >60.0 mL/min/1.73   POC CHEM 8    Specimen: Blood   Result Value Ref Range    Glucose 141 (H) 70 - 130 mg/dL    BUN 34 (H) 8 - 26 mg/dL    Creatinine 0.90 0.60 - 1.30 mg/dL    Sodium 141 138 - 146 mmol/L    POC Potassium 3.8 3.5 - 4.9 mmol/L    Chloride 100 98 - 109 mmol/L    Total CO2 29 24 - 29 mmol/L    Hemoglobin 15.3 12.0 - 17.0 g/dL    Hematocrit 45 38 - 51 %    Ionized Calcium 1.25 1.20 - 1.32 mmol/L    eGFR 66.8 >60.0 mL/min/1.73   ECG 12 Lead ED Triage Standing Order; Acute Stroke (Onset <24 hrs)   Result Value Ref Range    QT Interval 398 ms    QTC Interval 453 ms   Green Top (Gel)   Result Value Ref Range    Extra Tube Hold for add-ons.    Lavender Top   Result Value Ref Range    Extra Tube hold for add-on    Gold Top - SST   Result Value Ref Range    Extra Tube Hold for add-ons.    Gray Top   Result Value Ref Range    Extra Tube Hold for add-ons.    Light Blue Top   Result Value Ref Range    Extra Tube Hold for add-ons.         If labs were ordered, I independently reviewed the results and considered them in treating the patient.      EMERGENCY DEPARTMENT COURSE and DIFFERENTIAL DIAGNOSIS/MDM:   Vitals:  AS OF 17:10 EDT    BP - 130/88  HR - 75  TEMP - 97.6 °F (36.4 °C) (Axillary)  O2 SATS - 92%      Orders placed during this visit:  Orders Placed This Encounter   Procedures    CT Head Without Contrast Stroke Protocol    CT Angiogram Head w AI Analysis of LVO    CT Angiogram Neck    XR Chest 1 View    Hope Draw    Single High Sensitivity Troponin T    aPTT    AST    ALT    CBC Auto Differential    Comprehensive Metabolic Panel    Urinalysis With Microscopic If Indicated (No Culture) - Urine, Catheter    NPO Diet NPO Type: Strict NPO    Initiate Code Stroke    Perform NIH Stroke Scale    Measure  Actual Weight    Head of Bed 30 Degrees or Less    Undress and Gown    Continuous Pulse Oximetry    Vital Signs    Neuro Checks    Notify Provider SBP <80 or >200    Notify Provider for SBP > 140 if Hemorrhagic Stroke    No Hypotonic Fluids    Nursing Dysphagia Screening (Complete Prior to Giving anything PO)    RN to Place Order SLP Consult (IF swallow screen failed) - Eval & Treat Choosing Reason of RN Dysphagia Screen Failed    Inpatient Neurology Consult Stroke    Oxygen Therapy- Nasal Cannula; Titrate 1-6 LPM Per SpO2; 90 - 95%    POC CHEM 8    POCT Protime/INR    POC CHEM 8    ECG 12 Lead ED Triage Standing Order; Acute Stroke (Onset <24 hrs)    Insert Large-Bore Peripheral IV - Right AC Preferred    Initiate Observation Status    ED Bed Request    CBC & Differential    Green Top (Gel)    Lavender Top    Gold Top - SST    Gray Top    Light Blue Top       All labs have been independently reviewed by me.  All radiology studies have been reviewed by me and the radiologist dictating the report.  All EKG's have been independently viewed and interpreted by me.      Discussion below represents my analysis of pertinent findings related to patient's condition, differential diagnosis, treatment plan and final disposition.    Differential diagnosis:  The differential diagnosis associated with the patient's presentation includes: TIA, CVA, dementia, failure to thrive in adult, weakness, muscle atrophy    Additional sources  Discussed/ obtained information from independent historians:   [] Spouse  [] Parent  [] Family member  [] Friend  [x] EMS   [] Other:    External (non-ED) record review:   [] Inpatient record:   [] Office record:   [] Outpatient record:   [] Prior Outpatient labs:   [] Prior Outpatient radiology:   [] Primary Care record:   [] Outside ED record:   [] Other:     Patient's care impacted by:   [] Diabetes  [] Hypertension  [] CHF  [] Hyperlipidemia  [] Coronary Artery Disease   [] COPD   [] Cancer   []  Tobacco Abuse   [] Substance Abuse    [x] Other: Significant dementia    Care significantly affected by Social Determinants of Health (housing and economic circumstances, unemployment)    [] Yes     [x] No   If yes, Patient's care significantly limited by Social Determinants of Health including:   [] Inadequate housing   [] Low income   [] Alcoholism and drug addiction in family   [] Problems related to primary support group   [] Unemployment   [] Problems related to employment   [] Other Social Determinants of Health:       MEDICATIONS ADMINISTERED IN ED:  Medications   sodium chloride 0.9 % flush 10 mL (has no administration in time range)   iopamidol (ISOVUE-370) 76 % injection 100 mL (75 mL Intravenous Given 4/24/24 0205)              73-year-old female with right-sided facial droop, increasing weakness compared to her baseline.  Patient is seen immediately by myself in the CT scanner upon arrival was a code 19 stroke alert was initiated.  Initial CT head with no acute intracranial abnormalities, patient does have some chronic changes noted on initial assessment.  Stroke team is also following.  Very difficult to ascertain with the patient's normal baseline as.  We did obtain IV, labs and imaging, angiography.  Last known well was over 24 hours ago, no need for perfusion study at this time does not suspect a large vessel occlusion with indication for intervention at this time. white count, H&H stable.  We will continue with neurological workup with the plan for admission to the hospital for further workup and treatment.  Case discussed with hospitalist Dr. Bundy, for admission.    PROCEDURES:  Procedures    CRITICAL CARE TIME    Total Critical Care time was 40 minutes, excluding separately reportable procedures.  Stroke workup with neurological abnormalities with facial droop, weakness require multiple interventions, neurological assessments, rechecks, discussions with consultants. There was a high probability  of clinically significant/life threatening deterioration in the patient's condition which required my urgent intervention.      FINAL IMPRESSION      1. Altered mental status, unspecified altered mental status type          DISPOSITION/PLAN     ED Disposition       ED Disposition   Decision to Admit    Condition   --    Comment   Level of Care: Telemetry [5]   Diagnosis: Facial droop [757793]   Admitting Physician: TAYLOR DO [[id number]]               Comment: Please note this report has been produced using speech recognition software.      Jl Bravo DO  Attending Emergency Physician         Jl Bravo DO  04/24/24 2482

## 2024-04-24 NOTE — Clinical Note
Level of Care: Telemetry [5]   Diagnosis: Facial droop [560904]   Admitting Physician: TAYLOR DO [[id number]]

## 2024-04-24 NOTE — PLAN OF CARE
Goal Outcome Evaluation:              Outcome Evaluation: Patient admitted to the unit she is alert to name only. Has daughter at bedside. Skin assessment done and coccyx noted to be red and non blanchable, foam dressing applied to coccyx and heels. Alarms on and in place with call light within reach.

## 2024-04-24 NOTE — CONSULTS
Stroke Consult Note    Patient Name: Rolanda Damon   MRN: [id number]  Age: 73 y.o.  Sex: female  : 1950    Primary Care Physician: Shayna Og APRN  Referring Physician: Dr. Jl Bravo    TIME STROKE TEAM CALLED: 1334 EST     TIME PATIENT SEEN: 1338 EST    Handedness: Unknown   Race:      Chief Complaint/Reason for Consultation: Facial Droop, Decreased Responsiveness     Subjective .  HPI:     Rolanda Damon is a 73 year old  female with a PMH significant for end stage dementia with behavioral disturbance, TIA, PTSD, renal failure, diabetes, and breast cancer. Unfortunately she is unable to provide any relevant medical history or HPI. She presented to the ED via EMS from her NH with complaints of facial droop and decreased responsiveness since approximately noon yesterday. CODE STROKE was initiated on her arrival and she was taken to the CT scanner. BP on arrival 144/82. She is nonverbal. Not following commands. She appears to have contractures at baseline. Facial droop is noted. Unsure of what her baseline is at the NH, however she appears to be bed/ wheelchair bound. She will be admitted to the Hospital Medicine Team for further work-up and evaluation.     Last Known Normal Date/Time: >24 hours EST     Review of Systems   Unable to perform ROS: Dementia      Past Medical History:   Diagnosis Date    Breast cancer     Dementia     TIA (transient ischemic attack)      No past surgical history on file.  Family History   Problem Relation Age of Onset    Alzheimer's disease Father     Hypertension Father     Stroke Father      Social History     Socioeconomic History    Marital status:    Tobacco Use    Smoking status: Never   Substance and Sexual Activity    Alcohol use: No     Allergies   Allergen Reactions    Amoxicillin Unknown - Low Severity    Darvon [Propoxyphene] Unknown - Low Severity     Prior to Admission medications    Medication Sig Start Date End Date Taking?  Authorizing Provider   allopurinol (ZYLOPRIM) 100 MG tablet Take 100 mg by mouth Daily.    Alexy Dooley MD   amLODIPine (NORVASC) 2.5 MG tablet Take 2.5 mg by mouth Daily.    Alexy Dooley MD   ARIPiprazole (ABILIFY) 5 MG tablet Take 5 mg by mouth Daily.    Alexy Dooley MD   aspirin  MG tablet Take 325 mg by mouth Every 6 (Six) Hours As Needed.    Alexy Dooley MD   atorvastatin (LIPITOR) 10 MG tablet Take 10 mg by mouth Daily.    Alexy Dooley MD   carvedilol (COREG) 12.5 MG tablet Take 12.5 mg by mouth 2 (Two) Times a Day With Meals.    Alexy Dooley MD   cholecalciferol (VITAMIN D3) 1000 units tablet Take 1,000 Units by mouth Daily.    Alexy Dooley MD   donepezil (ARICEPT) 10 MG tablet Take 10 mg by mouth Every Night.    Alexy Dooley MD   EASYMAX TEST test strip 1 each by Other route 3 (Three) Times a Day. 4/18/19   Alexy Dooley MD   ferrous sulfate 325 (65 FE) MG tablet Take 325 mg by mouth Daily With Breakfast.    Alexy Dooley MD   insulin lispro protamine-insulin lispro (humaLOG 50-50) (50-50) 100 UNIT/ML suspension injection Inject  under the skin into the appropriate area as directed 2 (Two) Times a Day With Meals.    Alexy Dooley MD   Lancets misc 1 each by Other route 3 (Three) Times a Day. 4/18/19   Alexy Dooley MD   levothyroxine (SYNTHROID, LEVOTHROID) 75 MCG tablet Take 75 mcg by mouth Daily.    Alexy Dooley MD   Magnesium Oxide 400 (240 Mg) MG tablet  4/4/19   Alexy Dooley MD   melatonin 5 MG tablet tablet Take 5 mg by mouth.    Alexy Dooley MD   memantine (NAMENDA) 10 MG tablet Take 1 tablet by mouth 2 (Two) Times a Day. 4/23/21   Everett Ba MD   Omega-3 Fatty Acids (FISH OIL) 1000 MG capsule capsule Take 1,000 mg by mouth Daily With Breakfast.    Alexy Dooley MD   Omega-3 Fatty Acids (FISH OIL) 1000 MG capsule capsule  6/25/19   Alexy Dooley  MD   omeprazole (priLOSEC) 40 MG capsule Take 40 mg by mouth Daily.    ProviderAlexy MD   rOPINIRole (REQUIP) 1 MG tablet Take 1 mg by mouth Every Night. Take 1 hour before bedtime.    Alexy Dooley MD   TRADJENTA 5 MG tablet tablet  6/25/19   Alexy Dooley MD   vitamin C (ASCORBIC ACID) 500 MG tablet Take 500 mg by mouth Daily.    Alexy Dooley MD   vitamin D (ERGOCALCIFEROL) 85355 units capsule capsule  6/25/19   Alexy Dooley MD   ZIRGAN 0.15 % gel ophthalmic gel  7/3/19   Alexy Dooley MD     Objective     Heart Rate:  [83] 83  Resp:  [16] 16  BP: (144)/(82) 144/82    Neurological Exam  Mental Status  Awake and alert. Expressive aphasia and receptive aphasia present.  No comprehensible speech  Does not follow commands  Withdrawals to painful stimuli .    Cranial Nerves  CN II: Blinks to visual threat bilaterally .  CN III, IV, VI: Pupils equal round and reactive to light bilaterally.  CN VIII: Hearing appears to be intact bilaterally .    Motor  Decreased muscle bulk throughout. No fasciculations present. Decreased muscle tone.  Generalized weakness noted throughout .    Sensory  Withdrawals to pain .    Coordination    Unable to assess.    Gait    Unable to assess .      Physical Exam  Constitutional:       General: She is awake.      Appearance: She is ill-appearing.   HENT:      Mouth/Throat:      Pharynx: Oropharynx is clear.   Eyes:      Pupils: Pupils are equal, round, and reactive to light.   Pulmonary:      Effort: Pulmonary effort is normal.   Musculoskeletal:      Comments: Contractures noted   Skin:     General: Skin is warm and dry.   Neurological:      Mental Status: She is alert. She is disoriented.      Cranial Nerves: Cranial nerve deficit present.      Motor: Weakness present.       Acute Stroke Data    IV Thrombolytic (TPA/Tenecteplase) Inclusion / Exclusion Criteria    Time: 14:58 EDT  Person Administering Scale: Aliyah Rodriguez  APRN    Inclusion Criteria  [x]   18 years of age or greater   []   Onset of symptoms < 4.5 hours before beginning treatment (stroke onset = time patient was last seen well or without symptoms).   []   Diagnosis of acute ischemic stroke causing measurable disabling deficit (Complete Hemianopia, Any Aphasia, Visual or Sensory Extinction, Any weakness limiting sustained effort against gravity)   []   Any remaining deficit considered potentially disabling in view of patient and practitioner   Exclusion criteria (Do not proceed with Alteplase if any are checked under exclusion criteria)  [x]   Onset unknown or GREATER than 4.5 hours   []   ICH on CT/MRI   []   CT demonstrates hypodensity representing acute or subacute infarct   []   Significant head trauma or prior stroke in the previous 3 months   []   Symptoms suggestive of subarachnoid hemorrhage   []   History of un-ruptured intracranial aneurysm GREATER than 10 mm   []   Recent intracranial or intraspinal surgery within the last 3 months   []   Arterial puncture at a non-compressible site in the previous 7 days   []   Active internal bleeding   []   Acute bleeding tendency   []   Platelet count LESS than 100,000 for known hematological diseases such as leukemia, thrombocytopenia or chronic cirrhosis   []   Current use of anticoagulant with INR GREATER than 1.7 or PT GREATER than 15 seconds, aPTT GREATER than 40 seconds   []   Heparin received within 48 hours, resulting in abnormally elevated aPTT GREATER than upper limit of normal   []   Current use of direct thrombin inhibitors or direct factor Xa inhibitors in the past 48 hours   []   Elevated blood pressure refractory to treatment (systolic GREATER than 185 mm/Hg or diastolic  GREATER than 110 mm/Hg   []   Suspected infective endocarditis and aortic arch dissection   []   Current use of therapeutic treatment dose of low-molecular-weight heparin (LMWH) within the previous 24 hours   []   Structural GI malignancy  or bleed   Relative exclusion for all patients  []   Only minor nondisabling symptoms   []   Pregnancy   []   Seizure at onset with postictal residual neurological impairments   []   Major surgery or previous trauma within past 14 days   []   History of previous spontaneous ICH, intracranial neoplasm, or AV malformation   []   Postpartum (within previous 14 days)   []   Recent GI or urinary tract hemorrhage (within previous 21 days)   []   Recent acute MI (within previous 3 months)   []   History of unruptured intracranial aneurysm LESS than 10 mm   []   History of ruptured intracranial aneurysm   []   Blood glucose LESS than 50 mg/dL (2.7 mmol/L)   []   Dural puncture within the last 7 days   []   Known GREATER than 10 cerebral microbleeds   Additional exclusions for patients with symptoms onset between 3 and 4.5 hours.  []   Age > 80.   []   On any anticoagulants regardless of INR  >>> Warfarin (Coumadin), Heparin, Enoxaparin (Lovenox), fondaparinux (Arixtra), bivalirudin (Angiomax), Argatroban, dabigatran (Pradaxa), rivaroxaban (Xarelto), or apixaban (Eliquis)   []   Severe stroke (NIHSS > 25).   []   History of BOTH diabetes and previous ischemic stroke.   []   The risks and benefits have been discussed with the patient or family related to the administration of IV alteplase for stroke symptoms.   []   I have discussed and reviewed the patient's case and imaging with the attending prior to IV Thrombolytic (TPA/Tenecteplase).   N/A Time Thrombolytic administered       Hospital Meds:  Scheduled-    Infusions-     PRNs-   sodium chloride    Functional Status Prior to Current Stroke/Yinka Score: 4    NIH Stroke Scale  Time: 14:58 EDT  Person Administering Scale: REJI Cuello    1a  Level of consciousness: 0=alert; keenly responsive   1b. LOC questions:  2=Performs neither task correctly   1c. LOC commands: 2=Performs neither task correctly   2.  Best Gaze: 0=normal   3.  Visual: 0=No visual loss   4.  Facial Palsy: 1=Minor paralysis (flattened nasolabial fold, asymmetric on smiling)   5a.  Motor left arm: 2=Some effort against gravity, limb cannot get to or maintain (if cured) 90 (or 45) degrees, drifts down to bed, but has some effort against gravity   5b.  Motor right arm: 2=Some effort against gravity, limb cannot get to or maintain (if cured) 90 (or 45) degrees, drifts down to bed, but has some effort against gravity   6a. motor left le=Some effort against gravity, limb cannot get to or maintain (if cured) 90 (or 45) degrees, drifts down to bed, but has some effort against gravity   6b  Motor right le=Some effort against gravity, limb cannot get to or maintain (if cured) 90 (or 45) degrees, drifts down to bed, but has some effort against gravity   7. Limb Ataxia: 0=Absent   8.  Sensory: 0=Normal; no sensory loss   9. Best Language:  3=Mute, global aphasia; no usable speech or auditory comprehension   10. Dysarthria: 2=Severe; patient speech is so slurred as to be unintelligible in the absence of or our of proportion to any dysphagia, or is mute/anarthric   11. Extinction and Inattention: 0=No abnormality    Total:   18       Results Reviewed:  I have personally reviewed current lab, radiology, and data and agree with results.    CT Head Without Contrast Stroke Protocol    Result Date: 2024  CT HEAD WO CONTRAST STROKE PROTOCOL Date of Exam: 2024 2:22 PM EDT Indication: Neuro deficit, acute, stroke suspected Neuro deficit, acute stroke suspected. Comparison: None available. Technique: Axial CT images were obtained of the head without contrast administration.  Reconstructed coronal images were also obtained. Automated exposure control and iterative construction methods were used. Scan Time: 2:12 p.m. Results discussed with stroke team at 2:13 p.m. FINDINGS: Gray-white differentiation is maintained and there is no evidence of intracranial hemorrhage, mass or mass effect. Age-related changes of  the brain are present including volume loss and typical periventricular sequela of chronic small vessel ischemia. There is otherwise no evidence of intracranial hemorrhage, mass or mass effect. The ventricles are normal in size and configuration accounting for surrounding volume loss. The orbits are normal and the paranasal sinuses are grossly clear.     Impression: Age-related changes of the brain as above, otherwise without evidence of acute intracranial abnormality. Electronically Signed: Thiago Hernandez MD  4/24/2024 2:51 PM EDT  Workstation ID: [id number]     Narrative & Impression   CT ANGIOGRAM HEAD W AI ANALYSIS OF LVO, CT ANGIOGRAM NECK     Date of Exam: 4/24/2024 2:26 PM EDT     Indication: Neuro deficit, acute stroke suspected  Neuro deficit, acute stroke suspected.     Comparison: Concurrent noncontrast head CT     Technique: CTA of the head and neck was performed after the uneventful intravenous administration of 75 mL Isovue-370. Reconstructed coronal and sagittal images were also obtained. In addition, a 3-D volume rendered image was created for interpretation.   Automated exposure control and iterative reconstruction methods were used.        Findings:  CTA head:  No abrupt cut off/large vessel occlusion, flow-limiting stenosis, dissection, or aneurysm. The cerebral veins and dural venous sinuses are poorly opacified on this phase of imaging but are likely patent.     CTA NECK:  No abrupt cut off/large vessel occlusion, flow-limiting stenosis, dissection, or aneurysm. There is trace atherosclerosis of the bilateral carotid bifurcations without luminal irregularity or flow-limiting stenosis.     Extravascular findings:  Suspected elevation of the left hemidiaphragm, only partially imaged. Upper lungs are grossly clear. Homogeneous attenuation of the thyroid gland. No pharyngeal or laryngeal mass lesion. No acute or suspicious bony findings.     IMPRESSION:  Impression:  Essentially normal CTA of the head  and neck.           Electronically Signed: Dayton Arthur MD    4/24/2024 2:59 PM EDT    Workstation ID: [id number]     ECG 12 Lead ED Triage Standing Order; Acute Stroke (Onset <24 hrs)   Final Result   Test Reason : ED Triage Standing Order~   Blood Pressure :   */*   mmHG   Vent. Rate :  78 BPM     Atrial Rate :  78 BPM      P-R Int : 142 ms          QRS Dur :  86 ms       QT Int : 398 ms       P-R-T Axes :  30 -21   8 degrees      QTc Int : 453 ms      Normal sinus rhythm   Nonspecific ST and T wave abnormality   Abnormal ECG   No previous ECGs available   Confirmed by ABIGAIL HEMPHILL MD ([id number]) on 4/24/2024 3:21:01 PM      Referred By: EDMD           Confirmed By: ABIGAIL HEMPHILL MD        Lab Results   Component Value Date    GLUCOSE 139 (H) 04/24/2024    BUN 32 (H) 04/24/2024    CREATININE 0.90 04/24/2024    EGFR 66.8 04/24/2024    BCR 38.1 (H) 04/24/2024    K 4.0 04/24/2024    CO2 32.0 (H) 04/24/2024    CALCIUM 9.6 04/24/2024    ALBUMIN 3.9 04/24/2024    BILITOT 0.7 04/24/2024    AST 18 04/24/2024    AST 18 04/24/2024    ALT 14 04/24/2024    ALT 14 04/24/2024     WBC   Date Value Ref Range Status   04/24/2024 5.83 3.40 - 10.80 10*3/mm3 Final     RBC   Date Value Ref Range Status   04/24/2024 5.00 3.77 - 5.28 10*6/mm3 Final     Hemoglobin   Date Value Ref Range Status   04/24/2024 15.3 12.0 - 17.0 g/dL Final     Comment:     Serial Number: [id number]:  [id number]   04/24/2024 14.9 12.0 - 15.9 g/dL Final     Hematocrit   Date Value Ref Range Status   04/24/2024 45 38 - 51 % Final   04/24/2024 45.3 34.0 - 46.6 % Final     MCV   Date Value Ref Range Status   04/24/2024 90.6 79.0 - 97.0 fL Final     MCH   Date Value Ref Range Status   04/24/2024 29.8 26.6 - 33.0 pg Final     MCHC   Date Value Ref Range Status   04/24/2024 32.9 31.5 - 35.7 g/dL Final     RDW   Date Value Ref Range Status   04/24/2024 13.3 12.3 - 15.4 % Final     RDW-SD   Date Value Ref Range Status   04/24/2024 44.5 37.0 - 54.0 fl Final     MPV    Date Value Ref Range Status   04/24/2024 11.0 6.0 - 12.0 fL Final     Platelets   Date Value Ref Range Status   04/24/2024 117 (L) 140 - 450 10*3/mm3 Final     Neutrophil %   Date Value Ref Range Status   04/24/2024 69.6 42.7 - 76.0 % Final     Lymphocyte %   Date Value Ref Range Status   04/24/2024 22.6 19.6 - 45.3 % Final     Monocyte %   Date Value Ref Range Status   04/24/2024 5.8 5.0 - 12.0 % Final     Eosinophil %   Date Value Ref Range Status   04/24/2024 1.2 0.3 - 6.2 % Final     Basophil %   Date Value Ref Range Status   04/24/2024 0.3 0.0 - 1.5 % Final     Immature Grans %   Date Value Ref Range Status   04/24/2024 0.5 0.0 - 0.5 % Final     Neutrophils, Absolute   Date Value Ref Range Status   04/24/2024 4.05 1.70 - 7.00 10*3/mm3 Final     Lymphocytes, Absolute   Date Value Ref Range Status   04/24/2024 1.32 0.70 - 3.10 10*3/mm3 Final     Monocytes, Absolute   Date Value Ref Range Status   04/24/2024 0.34 0.10 - 0.90 10*3/mm3 Final     Eosinophils, Absolute   Date Value Ref Range Status   04/24/2024 0.07 0.00 - 0.40 10*3/mm3 Final     Basophils, Absolute   Date Value Ref Range Status   04/24/2024 0.02 0.00 - 0.20 10*3/mm3 Final     Immature Grans, Absolute   Date Value Ref Range Status   04/24/2024 0.03 0.00 - 0.05 10*3/mm3 Final     nRBC   Date Value Ref Range Status   04/24/2024 0.0 0.0 - 0.2 /100 WBC Final       Assessment/Plan:  73 year old  female with prior history of TIA. Presented to [facility] ED via EMS from her NH with generalized complaints of decreased responsiveness and facial droop. Secondary to extended LKW and lack of Large Vessel occlusion on CT imaging she was not deemed to be an appropriate for any emergent interventions.     Antiplatelet PTA: ASA  Anticoagulant PTA: none      Facial Droop, Decreased Responsiveness   -CVA orderset  -SLP/PT/OT to see  -MRI brain wo, routine  -ASA 325mg  -Lipitor 40mg nightly, will assess FLP in AM takes 10mg Lipitor per med rec   -Normal BP goals  secondary to extended LKW, overall management per Hospital Medicine Team   -TTE, routine  -NIHSS and neurochecks  -UA   -CM to assist with DC planning  -Diabetes education to see if appropriate  -FLP and A1c in AM     Stroke Neurology will continue to follow. Plan discussed with Dr. Bravo. Thanks for the consult in the care of this patient. Please call with any further questions or concerns.     Aliyah Rodriguez, APRN  April 24, 2024  14:58 EDT

## 2024-04-24 NOTE — H&P
[facility] Medicine Services  HISTORY AND PHYSICAL    Patient Name: Rolanda Damon  : 1950  MRN: [id number]  Primary Care Physician: Shayna Og APRN  Date of admission: 2024      Subjective   Subjective     Chief Complaint:  Facial droop, difficulty speaking     HPI:  Rolanda Damon is a 73 y.o. female with history of breast cancer s/p mastectomy, dementia, history of TIA, hypothyroid (no longer on medication), DM (no longer on medication) who presented with increased lethargy x 3-4 days, difficulty speaking x 2 days and right facial droop x 2 days. Typically is able to get in a wheelchair, talkative, can feed herself finger foods. Caregiver and daughter in law (POA) state that she was more lethargic over the weekend. Yesterday, she wouldn't speak much. Difficulty eating and pocketed food on the right. Noted water dripping out of her mouth on the right today. States she is more talkative but quality is garbled.     Per POA, many of her medications have been discontinued due to pill burden and underlying dementia. No longer talking medication for gout, hypothyroid, DM, HTN. States she thought ASA was reduced as she was on 325 mg due to history of TIA but no longer listed on home med rec from her facility.     Personal History     Past Medical History:   Diagnosis Date    Breast cancer     Dementia     TIA (transient ischemic attack)            No past surgical history on file.    Family History: family history includes Alzheimer's disease in her father; Hypertension in her father; Stroke in her father.     Social History:  reports that she has never smoked. She does not have any smokeless tobacco history on file. She reports that she does not drink alcohol.  Social History     Social History Narrative    Not on file       Medications:  Available home medication information reviewed.  ARIPiprazole, Lancets, Magnesium Oxide -Mg Supplement, allopurinol, amLODIPine,  aspirin, atorvastatin, carvedilol, cholecalciferol, donepezil, ferrous sulfate, fish oil, ganciclovir, glucose blood, insulin lispro protamine-insulin lispro, levothyroxine, linagliptin, melatonin, memantine, omeprazole, rOPINIRole, vitamin C, and vitamin D    Allergies   Allergen Reactions    Amoxicillin Unknown - Low Severity    Darvon [Propoxyphene] Unknown - Low Severity       Objective   Objective     Vital Signs:   Temp:  [97.6 °F (36.4 °C)] 97.6 °F (36.4 °C)  Heart Rate:  [72-83] 81  Resp:  [16] 16  BP: (126-151)/(75-97) 135/93       Physical Exam   Constitutional: Awake, alert; frail; chronically ill appearing   Eyes: PERRLA, sclerae anicteric, no conjunctival injection  HENT: NCAT, mucous membranes moist  Neck: Supple, no thyromegaly, no lymphadenopathy, trachea midline  Respiratory: Clear to auscultation bilaterally, nonlabored respirations   Cardiovascular: RRR, no murmurs, rubs, or gallops, palpable pedal pulses bilaterally  Gastrointestinal: Positive bowel sounds, soft, nontender, nondistended  Musculoskeletal: No bilateral ankle edema, no clubbing or cyanosis to extremities  Psychiatric: calm  Neurologic: able to move all ext but difficulty assessing as she can't follow commands; will no respond to voice but will randomly start talking  Skin: No rashes      Result Review:  I have personally reviewed the results from the time of this admission to 4/24/2024 17:37 EDT and agree with these findings:  []  Laboratory list / accordion  []  Microbiology  []  Radiology  []  EKG/Telemetry   []  Cardiology/Vascular   []  Pathology  []  Old records  []  Other:  Most notable findings include:       LAB RESULTS:      Lab 04/24/24  1428 04/24/24  1423   WBC  --  5.83   HEMOGLOBIN  --  14.9   HEMOGLOBIN, POC 15.3  --    HEMATOCRIT  --  45.3   HEMATOCRIT POC 45  --    PLATELETS  --  117*   NEUTROS ABS  --  4.05   IMMATURE GRANS (ABS)  --  0.03   LYMPHS ABS  --  1.32   MONOS ABS  --  0.34   EOS ABS  --  0.07   MCV  --   90.6   APTT  --  31.5         Lab 04/24/24  1428 04/24/24  1423   SODIUM  --  141   POTASSIUM  --  4.0   CHLORIDE  --  101   CO2  --  32.0*   ANION GAP  --  8.0   BUN  --  32*   CREATININE 0.90 0.84   EGFR 66.8 72.6   GLUCOSE  --  139*   CALCIUM  --  9.6         Lab 04/24/24  1423   TOTAL PROTEIN 6.8   ALBUMIN 3.9   GLOBULIN 2.9   ALT (SGPT) 14  14   AST (SGOT) 18  18   BILIRUBIN 0.7   ALK PHOS 82         Lab 04/24/24  1423   HSTROP T 27*                     Microbiology Results (last 10 days)       ** No results found for the last 240 hours. **            XR Chest 1 View    Result Date: 4/24/2024  XR CHEST 1 VW Date of Exam: 4/24/2024 3:04 PM EDT Indication: Acute Stroke Protocol (onset < 12 hrs) Comparison: None available. Findings: Unremarkable cardiomediastinal silhouette. Elevated left hemidiaphragm. Chronic parenchymal lung changes. Adjacent compressive atelectasis. No focal airspace consolidation. No pleural effusion or pneumothorax. No acute osseous abnormality. Surgical clips  in the bilateral chest wall soft tissues.     Impression: Impression: No acute cardiopulmonary abnormality. Electronically Signed: James Montes MD  4/24/2024 3:21 PM EDT  Workstation ID: [id number]    CT Angiogram Head w AI Analysis of LVO    Result Date: 4/24/2024  CT ANGIOGRAM HEAD W AI ANALYSIS OF LVO, CT ANGIOGRAM NECK Date of Exam: 4/24/2024 2:26 PM EDT Indication: Neuro deficit, acute stroke suspected Neuro deficit, acute stroke suspected. Comparison: Concurrent noncontrast head CT Technique: CTA of the head and neck was performed after the uneventful intravenous administration of 75 mL Isovue-370. Reconstructed coronal and sagittal images were also obtained. In addition, a 3-D volume rendered image was created for interpretation. Automated exposure control and iterative reconstruction methods were used. Findings: CTA head: No abrupt cut off/large vessel occlusion, flow-limiting stenosis, dissection, or aneurysm. The cerebral  veins and dural venous sinuses are poorly opacified on this phase of imaging but are likely patent. CTA NECK: No abrupt cut off/large vessel occlusion, flow-limiting stenosis, dissection, or aneurysm. There is trace atherosclerosis of the bilateral carotid bifurcations without luminal irregularity or flow-limiting stenosis. Extravascular findings: Suspected elevation of the left hemidiaphragm, only partially imaged. Upper lungs are grossly clear. Homogeneous attenuation of the thyroid gland. No pharyngeal or laryngeal mass lesion. No acute or suspicious bony findings.     Impression: Impression: Essentially normal CTA of the head and neck. Electronically Signed: Dayton Arthur MD  4/24/2024 2:59 PM EDT  Workstation ID: [id number]    CT Angiogram Neck    Result Date: 4/24/2024  CT ANGIOGRAM HEAD W AI ANALYSIS OF LVO, CT ANGIOGRAM NECK Date of Exam: 4/24/2024 2:26 PM EDT Indication: Neuro deficit, acute stroke suspected Neuro deficit, acute stroke suspected. Comparison: Concurrent noncontrast head CT Technique: CTA of the head and neck was performed after the uneventful intravenous administration of 75 mL Isovue-370. Reconstructed coronal and sagittal images were also obtained. In addition, a 3-D volume rendered image was created for interpretation. Automated exposure control and iterative reconstruction methods were used. Findings: CTA head: No abrupt cut off/large vessel occlusion, flow-limiting stenosis, dissection, or aneurysm. The cerebral veins and dural venous sinuses are poorly opacified on this phase of imaging but are likely patent. CTA NECK: No abrupt cut off/large vessel occlusion, flow-limiting stenosis, dissection, or aneurysm. There is trace atherosclerosis of the bilateral carotid bifurcations without luminal irregularity or flow-limiting stenosis. Extravascular findings: Suspected elevation of the left hemidiaphragm, only partially imaged. Upper lungs are grossly clear. Homogeneous attenuation of the  thyroid gland. No pharyngeal or laryngeal mass lesion. No acute or suspicious bony findings.     Impression: Impression: Essentially normal CTA of the head and neck. Electronically Signed: Dayton Arthur MD  4/24/2024 2:59 PM EDT  Workstation ID: [id number]    CT Head Without Contrast Stroke Protocol    Result Date: 4/24/2024  CT HEAD WO CONTRAST STROKE PROTOCOL Date of Exam: 4/24/2024 2:22 PM EDT Indication: Neuro deficit, acute, stroke suspected Neuro deficit, acute stroke suspected. Comparison: None available. Technique: Axial CT images were obtained of the head without contrast administration.  Reconstructed coronal images were also obtained. Automated exposure control and iterative construction methods were used. Scan Time: 2:12 p.m. Results discussed with stroke team at 2:13 p.m. FINDINGS: Gray-white differentiation is maintained and there is no evidence of intracranial hemorrhage, mass or mass effect. Age-related changes of the brain are present including volume loss and typical periventricular sequela of chronic small vessel ischemia. There is otherwise no evidence of intracranial hemorrhage, mass or mass effect. The ventricles are normal in size and configuration accounting for surrounding volume loss. The orbits are normal and the paranasal sinuses are grossly clear.     Impression: Age-related changes of the brain as above, otherwise without evidence of acute intracranial abnormality. Electronically Signed: Thiago Hernandez MD  4/24/2024 2:51 PM EDT  Workstation ID: [id number]         Assessment & Plan   Assessment & Plan       Facial droop    Dementia without behavioral disturbance    Type 2 diabetes mellitus    History of breast cancer    Personal history of transient ischemic attack (TIA), and cerebral infarction without residual deficits        Rolanda Damon is a 73 y.o. female with history of breast cancer s/p mastectomy, dementia, history of TIA, hypothyroid (no longer on medication), DM (no longer on  medication) who presented with increased lethargy x 3-4 days, difficulty speaking x 2 days and right facial droop x 2 days. CT head with age related changes; CTA head and neck essentially normal.     Right facial droop  Dysarthria   Lethargy   - Imaging per above  - CXR negative; UA pending   - MRI pending  - EEG  - ECHO   - , lipitor 40  - FLP, A1C   - Speech/PT/OT     Dementia   - Non-ambulatory. At baseline can speak but doesn't make sense, can feed herself finger foods   [facility] Has a sitter during the week   - Continue buspar, aricept and trazodone     History of HTN, DM, HLD, hypothyroid, gout  - No longer on medications  - add on TSH     History of breast cancer s/p bilateral mastectomy     DVT prophylaxis:  Mechanical DVT prophylaxis orders are signed and held.            CODE STATUS:    Code Status and Medical Interventions:   Ordered at: 04/24/24 1743     Medical Intervention Limits:    NO intubation (DNI)     Code Status (Patient has no pulse and is not breathing):    No CPR (Do Not Attempt to Resuscitate)     Medical Interventions (Patient has pulse or is breathing):    Limited Support       Expected Discharge   Expected discharge date/ time has not been documented.     Erika Verdugo, DO  04/24/24

## 2024-04-24 NOTE — LETTER
EMS Transport Request  For use at [facility], [place], [place], [place], and [place] only   Patient Name: Rolanda Damon : 1950   Weight:54.4 kg (120 lb) Pick-up Location: [facility] BLS/ALS: BLS/ALS: BLS   Insurance: MEDICARE Auth End Date:    Pre-Cert #: D/C Summary complete:    Destination: Other [facility]   Contact Precautions: None   Equipment (O2, Fluids, etc.): O2, settings 2L/NC   Arrive By Date/Time: 24 1500 Stretcher/WC: Stretcher   CM Requesting: Gertrude Fong RN Ext: [phone]   Notes/Medical Necessity: Bed bound, impaired functional mobility, balance, gait and endurance.     ______________________________________________________________________    *Only 2 patient bags OR 1 carry-on size bag are permitted.  Wheelchairs and walkers CANNOT transported with the patient. Acknowledge: Yes

## 2024-04-25 ENCOUNTER — APPOINTMENT (OUTPATIENT)
Dept: GENERAL RADIOLOGY | Facility: HOSPITAL | Age: 74
DRG: 065 | End: 2024-04-25
Payer: MEDICARE

## 2024-04-25 ENCOUNTER — APPOINTMENT (OUTPATIENT)
Dept: NEUROLOGY | Facility: HOSPITAL | Age: 74
DRG: 065 | End: 2024-04-25
Payer: MEDICARE

## 2024-04-25 ENCOUNTER — APPOINTMENT (OUTPATIENT)
Dept: CARDIOLOGY | Facility: HOSPITAL | Age: 74
DRG: 065 | End: 2024-04-25
Payer: MEDICARE

## 2024-04-25 ENCOUNTER — APPOINTMENT (OUTPATIENT)
Dept: MRI IMAGING | Facility: HOSPITAL | Age: 74
DRG: 065 | End: 2024-04-25
Payer: MEDICARE

## 2024-04-25 LAB
ASCENDING AORTA: 3.6 CM
AV VENA CONTRACTA: 0.28 CM
BH CV ECHO MEAS - AI P1/2T: 443.4 MSEC
BH CV ECHO MEAS - AO MAX PG: 2.48 MMHG
BH CV ECHO MEAS - AO MEAN PG: 1 MMHG
BH CV ECHO MEAS - AO ROOT DIAM: 3.2 CM
BH CV ECHO MEAS - AO V2 MAX: 78.8 CM/SEC
BH CV ECHO MEAS - AO V2 VTI: 16.2 CM
BH CV ECHO MEAS - AVA(I,D): 2.19 CM2
BH CV ECHO MEAS - EDV(CUBED): 27 ML
BH CV ECHO MEAS - EDV(MOD-SP2): 64.2 ML
BH CV ECHO MEAS - EDV(MOD-SP4): 54.9 ML
BH CV ECHO MEAS - EF(MOD-BP): 41.1 %
BH CV ECHO MEAS - EF(MOD-SP2): 45.3 %
BH CV ECHO MEAS - EF(MOD-SP4): 37.2 %
BH CV ECHO MEAS - ESV(CUBED): 15.6 ML
BH CV ECHO MEAS - ESV(MOD-SP2): 35.1 ML
BH CV ECHO MEAS - ESV(MOD-SP4): 34.5 ML
BH CV ECHO MEAS - FS: 16.7 %
BH CV ECHO MEAS - IVS/LVPW: 1.08 CM
BH CV ECHO MEAS - IVSD: 1.3 CM
BH CV ECHO MEAS - LA DIMENSION: 2.2 CM
BH CV ECHO MEAS - LAT PEAK E' VEL: 8.4 CM/SEC
BH CV ECHO MEAS - LV DIASTOLIC VOL/BSA (35-75): 35.7 CM2
BH CV ECHO MEAS - LV MASS(C)D: 116.6 GRAMS
BH CV ECHO MEAS - LV MAX PG: 1.02 MMHG
BH CV ECHO MEAS - LV MEAN PG: 1 MMHG
BH CV ECHO MEAS - LV SYSTOLIC VOL/BSA (12-30): 22.4 CM2
BH CV ECHO MEAS - LV V1 MAX: 50.5 CM/SEC
BH CV ECHO MEAS - LV V1 VTI: 11.3 CM
BH CV ECHO MEAS - LVIDD: 3 CM
BH CV ECHO MEAS - LVIDS: 2.5 CM
BH CV ECHO MEAS - LVOT AREA: 3.1 CM2
BH CV ECHO MEAS - LVOT DIAM: 2 CM
BH CV ECHO MEAS - LVPWD: 1.2 CM
BH CV ECHO MEAS - MED PEAK E' VEL: 4.9 CM/SEC
BH CV ECHO MEAS - MV A MAX VEL: 77.6 CM/SEC
BH CV ECHO MEAS - MV DEC SLOPE: 186 CM/SEC2
BH CV ECHO MEAS - MV DEC TIME: 0.16 SEC
BH CV ECHO MEAS - MV E MAX VEL: 37.9 CM/SEC
BH CV ECHO MEAS - MV E/A: 0.49
BH CV ECHO MEAS - MV MAX PG: 2.5 MMHG
BH CV ECHO MEAS - MV MEAN PG: 1 MMHG
BH CV ECHO MEAS - MV P1/2T: 46.8 MSEC
BH CV ECHO MEAS - MV V2 VTI: 14.4 CM
BH CV ECHO MEAS - MVA(P1/2T): 4.7 CM2
BH CV ECHO MEAS - MVA(VTI): 2.47 CM2
BH CV ECHO MEAS - PA ACC TIME: 0.15 SEC
BH CV ECHO MEAS - PA V2 MAX: 62.6 CM/SEC
BH CV ECHO MEAS - PI END-D VEL: 130 CM/SEC
BH CV ECHO MEAS - RAP SYSTOLE: 8 MMHG
BH CV ECHO MEAS - RVSP: 21.2 MMHG
BH CV ECHO MEAS - SV(LVOT): 35.5 ML
BH CV ECHO MEAS - SV(MOD-SP2): 29.1 ML
BH CV ECHO MEAS - SV(MOD-SP4): 20.4 ML
BH CV ECHO MEAS - SVI(LVOT): 23.1 ML/M2
BH CV ECHO MEAS - SVI(MOD-SP2): 18.9 ML/M2
BH CV ECHO MEAS - SVI(MOD-SP4): 13.3 ML/M2
BH CV ECHO MEAS - TAPSE (>1.6): 1.66 CM
BH CV ECHO MEAS - TR MAX PG: 13.2 MMHG
BH CV ECHO MEAS - TR MAX VEL: 182 CM/SEC
BH CV ECHO MEASUREMENTS AVERAGE E/E' RATIO: 5.7
BH CV ECHO SHUNT ASSESSMENT PERFORMED (HIDDEN SCRIPTING): 1
BH CV VAS BP LEFT ARM: NORMAL MMHG
BH CV XLRA - RV BASE: 3.1 CM
BH CV XLRA - RV LENGTH: 5.5 CM
BH CV XLRA - RV MID: 2.8 CM
BH CV XLRA - TDI S': 7.2 CM/SEC
CHOLEST SERPL-MCNC: 167 MG/DL (ref 0–200)
GLUCOSE BLDC GLUCOMTR-MCNC: 105 MG/DL (ref 70–130)
GLUCOSE BLDC GLUCOMTR-MCNC: 99 MG/DL (ref 70–130)
HBA1C MFR BLD: 6.7 % (ref 4.8–5.6)
HDLC SERPL-MCNC: 39 MG/DL (ref 40–60)
IVRT: 172 MS
LDLC SERPL CALC-MCNC: 112 MG/DL (ref 0–100)
LDLC/HDLC SERPL: 2.84 {RATIO}
LEFT ATRIUM VOLUME INDEX: 23.4 ML/M2
LV EF 2D ECHO EST: 50 %
TRIGL SERPL-MCNC: 86 MG/DL (ref 0–150)
VLDLC SERPL-MCNC: 16 MG/DL (ref 5–40)

## 2024-04-25 PROCEDURE — 83036 HEMOGLOBIN GLYCOSYLATED A1C: CPT | Performed by: NURSE PRACTITIONER

## 2024-04-25 PROCEDURE — G0378 HOSPITAL OBSERVATION PER HR: HCPCS

## 2024-04-25 PROCEDURE — 99232 SBSQ HOSP IP/OBS MODERATE 35: CPT | Performed by: STUDENT IN AN ORGANIZED HEALTH CARE EDUCATION/TRAINING PROGRAM

## 2024-04-25 PROCEDURE — 95819 EEG AWAKE AND ASLEEP: CPT

## 2024-04-25 PROCEDURE — 92610 EVALUATE SWALLOWING FUNCTION: CPT

## 2024-04-25 PROCEDURE — 70551 MRI BRAIN STEM W/O DYE: CPT

## 2024-04-25 PROCEDURE — 74230 X-RAY XM SWLNG FUNCJ C+: CPT

## 2024-04-25 PROCEDURE — 97165 OT EVAL LOW COMPLEX 30 MIN: CPT

## 2024-04-25 PROCEDURE — 92523 SPEECH SOUND LANG COMPREHEN: CPT

## 2024-04-25 PROCEDURE — 93306 TTE W/DOPPLER COMPLETE: CPT

## 2024-04-25 PROCEDURE — 99232 SBSQ HOSP IP/OBS MODERATE 35: CPT | Performed by: INTERNAL MEDICINE

## 2024-04-25 PROCEDURE — 82948 REAGENT STRIP/BLOOD GLUCOSE: CPT

## 2024-04-25 PROCEDURE — 92611 MOTION FLUOROSCOPY/SWALLOW: CPT

## 2024-04-25 PROCEDURE — 93306 TTE W/DOPPLER COMPLETE: CPT | Performed by: INTERNAL MEDICINE

## 2024-04-25 PROCEDURE — 80061 LIPID PANEL: CPT | Performed by: NURSE PRACTITIONER

## 2024-04-25 PROCEDURE — 95819 EEG AWAKE AND ASLEEP: CPT | Performed by: PSYCHIATRY & NEUROLOGY

## 2024-04-25 PROCEDURE — 97161 PT EVAL LOW COMPLEX 20 MIN: CPT

## 2024-04-25 RX ORDER — BUSPIRONE HYDROCHLORIDE 7.5 MG/1
7.5 TABLET ORAL 2 TIMES DAILY
COMMUNITY

## 2024-04-25 RX ADMIN — BARIUM SULFATE 10 ML: 400 SUSPENSION ORAL at 14:37

## 2024-04-25 RX ADMIN — ASPIRIN 300 MG: 300 SUPPOSITORY RECTAL at 11:04

## 2024-04-25 RX ADMIN — BUSPIRONE HYDROCHLORIDE 7.5 MG: 15 TABLET ORAL at 22:00

## 2024-04-25 RX ADMIN — ATORVASTATIN CALCIUM 40 MG: 40 TABLET, FILM COATED ORAL at 22:00

## 2024-04-25 RX ADMIN — BARIUM SULFATE 50 ML: 400 SUSPENSION ORAL at 14:37

## 2024-04-25 RX ADMIN — DONEPEZIL HYDROCHLORIDE 5 MG: 10 TABLET, FILM COATED ORAL at 22:00

## 2024-04-25 RX ADMIN — TRAZODONE HYDROCHLORIDE 25 MG: 50 TABLET ORAL at 22:00

## 2024-04-25 RX ADMIN — Medication 10 ML: at 11:05

## 2024-04-25 RX ADMIN — BRIVARACETAM 50 MG: 50 TABLET, FILM COATED ORAL at 22:00

## 2024-04-25 RX ADMIN — BARIUM SULFATE 20 ML: 400 PASTE ORAL at 14:37

## 2024-04-25 RX ADMIN — Medication 10 ML: at 22:00

## 2024-04-25 RX ADMIN — BARIUM SULFATE 100 ML: 0.81 POWDER, FOR SUSPENSION ORAL at 14:37

## 2024-04-25 NOTE — THERAPY EVALUATION
Acute Care - Speech Language Pathology   Swallow Initial Evaluation [facility]  Clinical Swallow Evaluation  & Cognitive-Communication Evaluation     Patient Name: Rolanda Damon  : 1950  MRN: [id number]  Today's Date: 2024               Admit Date: 2024    Visit Dx:     ICD-10-CM ICD-9-CM   1. Altered mental status, unspecified altered mental status type  R41.82 780.97   2. Dysarthria  R47.1 784.51   3. Dysphagia, unspecified type  R13.10 787.20   4. Cognitive communication deficit  R41.841 799.52     Patient Active Problem List   Diagnosis    Dementia without behavioral disturbance    Facial droop    Type 2 diabetes mellitus    History of breast cancer    Personal history of transient ischemic attack (TIA), and cerebral infarction without residual deficits    Thrombocytopenia     Past Medical History:   Diagnosis Date    Breast cancer     Dementia     TIA (transient ischemic attack)      History reviewed. No pertinent surgical history.    SLP Recommendation and Plan  SLP Swallowing Diagnosis: suspected pharyngeal dysphagia, oral dysphagia (24)  SLP Diet Recommendation: NPO (24)     SLP Rec. for Method of Medication Administration: meds via alternate route (24)        Recommended Diagnostics: VFSS (MBS) (discussed w/ pt's legal guardian who would like to proceed w/ MBS to determine safest diet for when pt returns to LTC) (24)  Swallow Criteria for Skilled Therapeutic Interventions Met: demonstrates skilled criteria (24)  Anticipated Discharge Disposition (SLP): extended care facility (24)  Rehab Potential/Prognosis, Swallowing: re-evaluate goals as necessary (24)     Predicted Duration Therapy Intervention (Days): until discharge (24)  Oral Care Recommendations: Oral Care BID/PRN, Suction toothbrush (24)                                        Plan of Care Reviewed With: patient, caregiver,  guardian      SWALLOW EVALUATION (Last 72 Hours)       SLP Adult Swallow Evaluation       Row Name 04/25/24 0900                   Rehab Evaluation    Document Type evaluation  -AC        Subjective Information no complaints  -AC        Patient Observations alert;cooperative  -AC        Patient/Family/Caregiver Comments/Observations Hired sitter/caregiver present.  -AC        Patient Effort adequate  -AC           General Information    Patient Profile Reviewed yes  -AC        Pertinent History Of Current Problem Decreased responsiveness, R facial droop/speech difficulty x2 days per chart (though caregiver said speech difficulty for couple weeks). Hx dementia, TIA, breast CA s/p mastectomy, sz per caregiver.  -AC        Current Method of Nutrition NPO  -AC        Precautions/Limitations, Vision neglect, right  -AC        Precautions/Limitations, Hearing WFL;for purposes of eval  -AC        Prior Level of Function-Communication cognitive-linguistic impairment  -AC        Prior Level of Function-Swallowing soft to chew;whole;thin liquids;safe, efficient swallowing in all situations  -AC        Plans/Goals Discussed with patient;other (see comments)  sitter/caregiver & legal guardian (via phone)--agreed upon  -AC        Barriers to Rehab cognitive status  -AC        Patient's Goals for Discharge patient could not state  -AC        Family Goals for Discharge patient able to eat/drink without coughing/choking  -AC           Pain    Additional Documentation Pain Scale: FACES Pre/Post-Treatment (Group)  -AC           Pain Scale: FACES Pre/Post-Treatment    Pain: FACES Scale, Pretreatment 0-->no hurt  -AC        Posttreatment Pain Rating 0-->no hurt  -AC           Oral Motor Structure and Function    Dentition Assessment missing teeth  no upper dentition/dentures  -AC        Secretion Management dried secretions in oral cavity  not witnessed during eval, but caregiver reported noting drooling recently  -AC        Mucosal  Quality dry  -AC           Oral Musculature and Cranial Nerve Assessment    Oral Motor General Assessment unable to assess  2' cog status  -AC        Oral Labial or Buccal Impairment, Detail, Cranial Nerve VII (Facial): right labial droop;other (see comments)  noted @ rest  -AC           General Eating/Swallowing Observations    Respiratory Support Currently in Use nasal cannula  -AC        O2 Liters 3L  -AC        Eating/Swallowing Skills fed by SLP;fed by staff/caregiver  -AC        Positioning During Eating upright 90 degree;upright in bed  -AC        Utensils Used spoon;cup;straw  -AC        Consistencies Trialed thin liquids;nectar/syrup-thick liquids;pureed  -AC           Clinical Swallow Eval    Oral Prep Phase impaired  -AC        Oral Transit impaired  -AC        Oral Residue impaired  -AC        Pharyngeal Phase suspected pharyngeal impairment  -AC        Esophageal Phase unremarkable  -AC           Oral Prep Concerns    Oral Prep Concerns incomplete or weak lip closure around spoon;oral holding  -AC        Oral Holding all consistencies  -AC        Incomplete or Weak Lip Closure Around Spoon all consistencies  -AC           Oral Transit Concerns    Oral Transit Concerns increased oral transit time  -AC        Increased Oral Transit Time all consistencies  -AC           Oral Residue Concerns    Oral Residue Concerns diffuse residue throughout oral cavity  -AC        Diffuse Residue Throughout Oral Cavity pudding  -AC        Oral Residue Concerns, Comment Mild  -AC           Pharyngeal Phase Concerns    Pharyngeal Phase Concerns cough;throat clear;wet vocal quality  -AC        Pharyngeal Phase Concerns, Comment Delayed coughing, throat clearing, & wet vocal quality a couple minutes following PO trials. Seemingly weak/poorly coordinated swallow per laryngeal palpation.  -AC           Swallowing Quality of Life Assessment    Education and counseling provided Signs of aspiration;Risks of aspiration  -AC            SLP Evaluation Clinical Impression    SLP Swallowing Diagnosis suspected pharyngeal dysphagia;oral dysphagia  -        Functional Impact risk of aspiration/pneumonia;risk of malnutrition;risk of dehydration  -        Rehab Potential/Prognosis, Swallowing re-evaluate goals as necessary  -        Swallow Criteria for Skilled Therapeutic Interventions Met demonstrates skilled criteria  -           Recommendations    SLP Diet Recommendation NPO  -        Recommended Diagnostics VFSS (MBS)  discussed w/ pt's legal guardian who would like to proceed w/ MBS to determine safest diet for when pt returns to LTC  -        Oral Care Recommendations Oral Care BID/PRN;Suction toothbrush  -        SLP Rec. for Method of Medication Administration meds via alternate route  -AC        Anticipated Discharge Disposition (SLP) extended care facility  -                  User Key  (r) = Recorded By, (t) = Taken By, (c) = Cosigned By      Initials Name Effective Dates     Marnie Velázquez MS [facility]-SLP 02/03/23 -                     EDUCATION  The patient has been educated in the following areas:   Dysphagia (Swallowing Impairment) NPO rationale.        SLP GOALS       Row Name 04/25/24 0968             Patient will demonstrate functional speech skills for return to discharge environment    [place] with maximum cues  -AC      Time frame 1 week  -AC         Patient Will Implement Strategies Goal 1 (SLP)    Implement Strategies of Goal 1 (SLP) using a gesture when requested;80%;with minimal cues (75-90%)  use of caregiver strategies (avoid open-ended questions, reduce loud background sounds, cue pt to slow down)  -AC      Time Frame (Strategy Implementation Goal 1, SLP) 1 week  -AC         Attention Goal 1 (SLP)    Improve Attention by Goal 1 (SLP) attending to task;70%;with moderate cues (50-74%)  attend to stimuli on R side  -AC      Time Frame (Attention Goal 1, SLP) 1 week  -AC                User Key  (r) =  Recorded By, (t) = Taken By, (c) = Cosigned By      Initials Name Provider Type    Marnie Amado MS CCC-SLP Speech and Language Pathologist                       Time Calculation:    Time Calculation- SLP       Row Name 24 1003             Time Calculation- SLP    SLP Start Time 0900  -AC      SLP Received On 24  -AC         Untimed Charges    66384-GP Eval Speech and Production w/ Language Minutes 25  -AC      04132-XD Eval Oral Pharyng Swallow Minutes 45  -AC         Total Minutes    Untimed Charges Total Minutes 70  -AC       Total Minutes 70  -AC                User Key  (r) = Recorded By, (t) = Taken By, (c) = Cosigned By      Initials Name Provider Type    Marnie Amado MS CCC-SLP Speech and Language Pathologist                    Therapy Charges for Today       Code Description Service Date Service Provider Modifiers Qty    91786800277 HC ST EVAL ORAL PHARYNG SWALLOW 3 2024 Marnie Velázquez MS CCC-SLP GN 1    [id number] HC ST EVAL SPEECH AND PROD W LANG  2 2024 Marnie Velázquez MS CCC-SLP GN 1                 Marnie Velázquez MS CCC-SLP  2024   and Acute Care - Speech Language Pathology Initial Evaluation  [facility]     Patient Name: Rolanda Damon  : 1950  MRN: [id number]  Today's Date: 2024               Admit Date: 2024     Visit Dx:    ICD-10-CM ICD-9-CM   1. Altered mental status, unspecified altered mental status type  R41.82 780.97   2. Dysarthria  R47.1 784.51   3. Dysphagia, unspecified type  R13.10 787.20   4. Cognitive communication deficit  R41.841 799.52     Patient Active Problem List   Diagnosis    Dementia without behavioral disturbance    Facial droop    Type 2 diabetes mellitus    History of breast cancer    Personal history of transient ischemic attack (TIA), and cerebral infarction without residual deficits    Thrombocytopenia     Past Medical History:   Diagnosis Date    Breast cancer     Dementia     TIA (transient ischemic attack)       History reviewed. No pertinent surgical history.    SLP Recommendation and Plan  SLP Diagnosis: moderate-severe, dysarthria (04/25/24 0915)  SLP Diagnosis Comments: Suspect severe cognitive-linguistic deficits, though difficult to fully assess 2' severity of dysarthria. Pt's sitter/caregiver reported that pt is generally less communicative than normal at present, but feels that cognitive-linguistic skills close to baseline. Dysarthria is acute per her report. (04/25/24 0915)        Swallow Criteria for Skilled Therapeutic Interventions Met: demonstrates skilled criteria (04/25/24 0900)  SLC Criteria for Skilled Therapy Interventions Met: yes (04/25/24 0915)  Anticipated Discharge Disposition (SLP): extended care facility (04/25/24 0915)        Therapy Frequency (SLP SLC): 5 days per week (04/25/24 0915)  Predicted Duration Therapy Intervention (Days): until discharge (04/25/24 0915)  Oral Care Recommendations: Oral Care BID/PRN, Suction toothbrush (04/25/24 0900)                          Plan of Care Reviewed With: patient, caregiver, guardian (04/25/24 1002)      SLP EVALUATION (Last 72 Hours)       SLP SLC Evaluation       Row Name 04/25/24 0915                   Communication Assessment/Intervention    Document Type evaluation  -AC           General Information    Patient Profile Reviewed yes  -AC        Prior Level of Function-Communication cognitive-linguistic impairment  -AC        Plans/Goals Discussed with patient;other (see comments)  sitter/caregiver & legal guardian (via phone)--agreed upon  -AC        Barriers to Rehab cognitive status  -AC        Patient's Goals for Discharge patient could not state  -AC        Family Goals for Discharge family did not state  -AC           Pain Scale: FACES Pre/Post-Treatment    Pain: FACES Scale, Pretreatment 0-->no hurt  -AC        Posttreatment Pain Rating 0-->no hurt  -AC           Comprehension Assessment/Intervention    Comprehension Assessment/Intervention  Auditory Comprehension  -AC           Auditory Comprehension Assessment/Intervention    Auditory Comprehension (Communication) severe impairment  -AC        Answers Questions (Communication) severe impairment;personal;simple;yes/no  -AC        Able to Follow Commands (Communication) severe impairment;1-step  -AC           Expression Assessment/Intervention    Expression Assessment/Intervention verbal expression  -AC           Verbal Expression Assessment/Intervention    Verbal Expression unable/difficult to assess  2' severity of dysarthria/cognitive impairments  -AC        Automatic Speech (Communication) severe impairment;response to greeting  -AC        Repetition moderate impairment;severe impairment;sounds  -AC           Motor Speech Assessment/Intervention    Motor Speech Function moderate impairment;severe impairment;familiar listener;unfamiliar listener  -AC        Characteristics Consistent with Dysarthria decreased articulation;slurred speech  -AC        Conversational Speech (Communication) moderate impairment;severe impairment;simple  -AC        Speech intelligibility 30%;in quiet environment;in connected speech;with unfamiliar listener  -AC           Cursory Voice Assessment/Intervention    Quality and Resonance (Voice) unable/difficult to assess  -AC           Cognitive Assessment Intervention- SLP    Cognitive Function (Cognition) unable/difficult to assess  -AC        Orientation Status (Cognition) severe impairment;person;situation  -AC        Attention (Cognitive) severe impairment;sustained  -AC           SLP Evaluation Clinical Impressions    SLP Diagnosis moderate-severe;dysarthria  -AC        SLP Diagnosis Comments Suspect severe cognitive-linguistic deficits, though difficult to fully assess 2' severity of dysarthria. Pt's sitter/caregiver reported that pt is generally less communicative than normal at present, but feels that cognitive-linguistic skills close to baseline. Dysarthria is acute  per her report.  -AC        Rehab Potential/Prognosis fair  -        SLC Criteria for Skilled Therapy Interventions Met yes  -AC        Functional Impact difficulty communicating wants, needs  -           Recommendations    Therapy Frequency (SLP SLC) 5 days per week  -        Predicted Duration Therapy Intervention (Days) until discharge  -        Anticipated Discharge Disposition (SLP) extended care [facility]  -                  User Key  (r) = Recorded By, (t) = Taken By, (c) = Cosigned By      Initials Name Effective Dates    Marnie Amado MS CCC-SLP 02/03/23 -                        EDUCATION  The patient has been educated in the following areas:     Cognitive Impairment Communication Impairment.           SLP GOALS       Row Name 04/25/24 0915             Patient will demonstrate functional speech skills for return to discharge environment    [place] with maximum cues  -AC      Time frame 1 week  -AC         Patient Will Implement Strategies Goal 1 (SLP)    Implement Strategies of Goal 1 (SLP) using a gesture when requested;80%;with minimal cues (75-90%)  use of caregiver strategies (avoid open-ended questions, reduce loud background sounds, cue pt to slow down)  -AC      Time Frame (Strategy Implementation Goal 1, SLP) 1 week  -AC         Attention Goal 1 (SLP)    Improve Attention by Goal 1 (SLP) attending to task;70%;with moderate cues (50-74%)  attend to stimuli on R side  -AC      Time Frame (Attention Goal 1, SLP) 1 week  -AC                User Key  (r) = Recorded By, (t) = Taken By, (c) = Cosigned By      Initials Name Provider Type    Marnie Amado MS CCC-SLP Speech and Language Pathologist                            Time Calculation:      Time Calculation- SLP       Row Name 04/25/24 1003             Time Calculation- SLP    SLP Start Time 0900  -      SLP Received On 04/25/24  -         Untimed Charges    59997-MC Eval Speech and Production w/ Language Minutes 25  -       31890-CH Eval Oral Pharyng Swallow Minutes 45  -AC         Total Minutes    Untimed Charges Total Minutes 70  -AC       Total Minutes 70  -AC                User Key  (r) = Recorded By, (t) = Taken By, (c) = Cosigned By      Initials Name Provider Type    Marnie Amado MS CCC-SLP Speech and Language Pathologist                    Therapy Charges for Today       Code Description Service Date Service Provider Modifiers Qty    [id number]  ST EVAL ORAL PHARYNG SWALLOW 3 4/25/2024 Marnie Velázquez MS CCC-SLP GN 1    [id number]  ST EVAL SPEECH AND PROD W LANG  2 4/25/2024 Marnie Velázquez, MS CCC-SLP GN 1                       Marnie Velázquez MS CCC-SLP  4/25/2024

## 2024-04-25 NOTE — PROGRESS NOTES
[facility] Medicine Services  PROGRESS NOTE    Patient Name: Rolanda Damon  : 1950  MRN: [id number]    Date of Admission: 2024  Primary Care Physician: Shayna Og APRN    Subjective   Subjective     CC: Follow-up facial droop, difficulty speaking    HPI: No acute events overnight, patient working with PT/OT remains dysarthric    Objective   Objective     Vital Signs:   Temp:  [97.2 °F (36.2 °C)-97.6 °F (36.4 °C)] 97.5 °F (36.4 °C)  Heart Rate:  [62-83] 67  Resp:  [16] 16  BP: (123-157)/(75-97) 135/85  Flow (L/min):  [1.5-2] 2     Physical Exam:  Constitutional: Chronically ill-appearing elderly, seated in chair  HENT: NCAT, mucous membranes moist  Respiratory: Clear to auscultation bilaterally, respiratory effort normal   Cardiovascular: RRR, no murmurs, rubs, or gallops  Gastrointestinal: Positive bowel sounds, soft, nontender, nondistended  Musculoskeletal: No bilateral ankle edema  Psychiatric: Appropriate affect, cooperative  Neurologic: Right facial droop, dysarthric speech, incoherent    Results Reviewed:  LAB RESULTS:      Lab 24  1428 24  1423   WBC  --  5.83   HEMOGLOBIN  --  14.9   HEMOGLOBIN, POC 15.3  --    HEMATOCRIT  --  45.3   HEMATOCRIT POC 45  --    PLATELETS  --  117*   NEUTROS ABS  --  4.05   IMMATURE GRANS (ABS)  --  0.03   LYMPHS ABS  --  1.32   MONOS ABS  --  0.34   EOS ABS  --  0.07   MCV  --  90.6   APTT  --  31.5         Lab 24  0444 24  1428 24  1423   SODIUM  --   --  141   POTASSIUM  --   --  4.0   CHLORIDE  --   --  101   CO2  --   --  32.0*   ANION GAP  --   --  8.0   BUN  --   --  32*   CREATININE  --  0.90 0.84   EGFR  --  66.8 72.6   GLUCOSE  --   --  139*   CALCIUM  --   --  9.6   HEMOGLOBIN A1C 6.70*  --   --    TSH  --   --  4.530*         Lab 24  1423   TOTAL PROTEIN 6.8   ALBUMIN 3.9   GLOBULIN 2.9   ALT (SGPT) 14  14   AST (SGOT) 18  18   BILIRUBIN 0.7   ALK PHOS 82         Lab  04/24/24  1423   HSTROP T 27*         Lab 04/25/24  0444   CHOLESTEROL 167   LDL CHOL 112*   HDL CHOL 39*   TRIGLYCERIDES 86             Brief Urine Lab Results       None            Microbiology Results Abnormal       None            XR Chest 1 View    Result Date: 4/24/2024  XR CHEST 1 VW Date of Exam: 4/24/2024 3:04 PM EDT Indication: Acute Stroke Protocol (onset < 12 hrs) Comparison: None available. Findings: Unremarkable cardiomediastinal silhouette. Elevated left hemidiaphragm. Chronic parenchymal lung changes. Adjacent compressive atelectasis. No focal airspace consolidation. No pleural effusion or pneumothorax. No acute osseous abnormality. Surgical clips  in the bilateral chest wall soft tissues.     Impression: Impression: No acute cardiopulmonary abnormality. Electronically Signed: Jaems Montes MD  4/24/2024 3:21 PM EDT  Workstation ID: [id number]    CT Angiogram Head w AI Analysis of LVO    Result Date: 4/24/2024  CT ANGIOGRAM HEAD W AI ANALYSIS OF LVO, CT ANGIOGRAM NECK Date of Exam: 4/24/2024 2:26 PM EDT Indication: Neuro deficit, acute stroke suspected Neuro deficit, acute stroke suspected. Comparison: Concurrent noncontrast head CT Technique: CTA of the head and neck was performed after the uneventful intravenous administration of 75 mL Isovue-370. Reconstructed coronal and sagittal images were also obtained. In addition, a 3-D volume rendered image was created for interpretation. Automated exposure control and iterative reconstruction methods were used. Findings: CTA head: No abrupt cut off/large vessel occlusion, flow-limiting stenosis, dissection, or aneurysm. The cerebral veins and dural venous sinuses are poorly opacified on this phase of imaging but are likely patent. CTA NECK: No abrupt cut off/large vessel occlusion, flow-limiting stenosis, dissection, or aneurysm. There is trace atherosclerosis of the bilateral carotid bifurcations without luminal irregularity or flow-limiting stenosis.  Extravascular findings: Suspected elevation of the left hemidiaphragm, only partially imaged. Upper lungs are grossly clear. Homogeneous attenuation of the thyroid gland. No pharyngeal or laryngeal mass lesion. No acute or suspicious bony findings.     Impression: Impression: Essentially normal CTA of the head and neck. Electronically Signed: Dayton Arthur MD  4/24/2024 2:59 PM EDT  Workstation ID: [id number]    CT Angiogram Neck    Result Date: 4/24/2024  CT ANGIOGRAM HEAD W AI ANALYSIS OF LVO, CT ANGIOGRAM NECK Date of Exam: 4/24/2024 2:26 PM EDT Indication: Neuro deficit, acute stroke suspected Neuro deficit, acute stroke suspected. Comparison: Concurrent noncontrast head CT Technique: CTA of the head and neck was performed after the uneventful intravenous administration of 75 mL Isovue-370. Reconstructed coronal and sagittal images were also obtained. In addition, a 3-D volume rendered image was created for interpretation. Automated exposure control and iterative reconstruction methods were used. Findings: CTA head: No abrupt cut off/large vessel occlusion, flow-limiting stenosis, dissection, or aneurysm. The cerebral veins and dural venous sinuses are poorly opacified on this phase of imaging but are likely patent. CTA NECK: No abrupt cut off/large vessel occlusion, flow-limiting stenosis, dissection, or aneurysm. There is trace atherosclerosis of the bilateral carotid bifurcations without luminal irregularity or flow-limiting stenosis. Extravascular findings: Suspected elevation of the left hemidiaphragm, only partially imaged. Upper lungs are grossly clear. Homogeneous attenuation of the thyroid gland. No pharyngeal or laryngeal mass lesion. No acute or suspicious bony findings.     Impression: Impression: Essentially normal CTA of the head and neck. Electronically Signed: Dayton Arthur MD  4/24/2024 2:59 PM EDT  Workstation ID: [id number]    CT Head Without Contrast Stroke Protocol    Result Date:  4/24/2024  CT HEAD WO CONTRAST STROKE PROTOCOL Date of Exam: 4/24/2024 2:22 PM EDT Indication: Neuro deficit, acute, stroke suspected Neuro deficit, acute stroke suspected. Comparison: None available. Technique: Axial CT images were obtained of the head without contrast administration.  Reconstructed coronal images were also obtained. Automated exposure control and iterative construction methods were used. Scan Time: 2:12 p.m. Results discussed with stroke team at 2:13 p.m. FINDINGS: Gray-white differentiation is maintained and there is no evidence of intracranial hemorrhage, mass or mass effect. Age-related changes of the brain are present including volume loss and typical periventricular sequela of chronic small vessel ischemia. There is otherwise no evidence of intracranial hemorrhage, mass or mass effect. The ventricles are normal in size and configuration accounting for surrounding volume loss. The orbits are normal and the paranasal sinuses are grossly clear.     Impression: Age-related changes of the brain as above, otherwise without evidence of acute intracranial abnormality. Electronically Signed: Thiago Hernandez MD  4/24/2024 2:51 PM EDT  Workstation ID: [id number]         Current medications:  Scheduled Meds:aspirin, 325 mg, Oral, Daily   Or  aspirin, 300 mg, Rectal, Daily  atorvastatin, 40 mg, Oral, Nightly  busPIRone, 7.5 mg, Oral, Q12H  donepezil, 5 mg, Oral, Nightly  sodium chloride, 10 mL, Intravenous, Q12H  traZODone, 25 mg, Oral, Nightly      Continuous Infusions:   PRN Meds:.  acetaminophen **OR** acetaminophen **OR** acetaminophen    senna-docusate sodium **AND** polyethylene glycol **AND** bisacodyl **AND** bisacodyl    nitroglycerin    sodium chloride    sodium chloride    sodium chloride    Assessment & Plan   Assessment & Plan     Active Hospital Problems    Diagnosis  POA    **Facial droop [R29.810]  Yes    Type 2 diabetes mellitus [E11.9]  Unknown    History of breast cancer [Z85.3]  Not  Applicable    Personal history of transient ischemic attack (TIA), and cerebral infarction without residual deficits [Z86.73]  Not Applicable    Thrombocytopenia [D69.6]  Unknown    Dementia without behavioral disturbance [F03.90]  Yes      Resolved Hospital Problems   No resolved problems to display.        Brief Hospital Course to date:  Rolanda Damon is a 73 y.o. female with history of breast cancer s/p mastectomy, dementia, history of TIA, hypothyroid (no longer on medication), DM (no longer on medication) who presented with increased lethargy x 3-4 days, difficulty speaking x 2 days and right facial droop x 2 days.  Admitted for stroke rule out    Suspected stroke  History of TIAs  -Patient presenting with right facial droop and aphasia  -Stroke neurology following CT head, CTA head and neck are unremarkable  -Follow-up MRI, echo  -Continue aspirin, statin  -PT/OT to evaluate    Dementia  -Nonambulatory, at baseline patient able to speak but is incoherent  -Currently resident at [place]  -Continue BuSpar, Aricept and trazodone    History of hypertension, hyperlipidemia, hypothyroidism, gout  -Patient is no longer on medications for above medical issues  -Baseline TSH is elevated, follow-up free T4    History of breast cancers s/p bilateral mastectomy    All problems listed above are new to me today    Expected Discharge Location and Transportation: [place]  Expected Discharge   Expected Discharge Date: 4/27/2024; Expected Discharge Time:      DVT prophylaxis:  Mechanical DVT prophylaxis orders are present.         AM-PAC 6 Clicks Score (PT): 6 (04/24/24 2030)    CODE STATUS:   Code Status and Medical Interventions:   Ordered at: 04/24/24 1571     Medical Intervention Limits:    NO intubation (DNI)     Code Status (Patient has no pulse and is not breathing):    No CPR (Do Not Attempt to Resuscitate)     Medical Interventions (Patient has pulse or is breathing):    Limited Support       Khadar Boswell  MD  04/25/24

## 2024-04-25 NOTE — PLAN OF CARE
Goal Outcome Evaluation:  Plan of Care Reviewed With: patient, caregiver           Outcome Evaluation: Pt. presents below baseline function w/generalized weakness and balance deficits affecting her ability to safely participate in functional mobility. She performed transfers w/max assist of 2. Activity limited by impaired cognitive status. Pt. would benefit from IPPT to address stated deficits.      Anticipated Discharge Disposition (PT): home with 24/7 care, extended care facility, other (see comments) (return to previous facility w/CG support)

## 2024-04-25 NOTE — PLAN OF CARE
Goal Outcome Evaluation:  Plan of Care Reviewed With: patient, caregiver        Progress: no change (Initial Eval)  Outcome Evaluation: Pt presenting below her functional baseline requiring increased assist w/ transfers, bed mobility and ADLs. Pt required significant assist for bed mobility. Pt limited by baseline cognitive status. IP OT warranted to address deficits. Recommend return to facility w/ sitter.      Anticipated Discharge Disposition (OT): extended care facility

## 2024-04-25 NOTE — THERAPY EVALUATION
Patient Name: Rolanda Damon  : 1950    MRN: [id number]                              Today's Date: 2024       Admit Date: 2024    Visit Dx:     ICD-10-CM ICD-9-CM   1. Altered mental status, unspecified altered mental status type  R41.82 780.97   2. Dysarthria  R47.1 784.51   3. Dysphagia, unspecified type  R13.10 787.20   4. Cognitive communication deficit  R41.841 799.52     Patient Active Problem List   Diagnosis    Dementia without behavioral disturbance    Facial droop    Type 2 diabetes mellitus    History of breast cancer    Personal history of transient ischemic attack (TIA), and cerebral infarction without residual deficits    Thrombocytopenia     Past Medical History:   Diagnosis Date    Breast cancer     Dementia     TIA (transient ischemic attack)      History reviewed. No pertinent surgical history.   General Information       Row Name 24 1010          Physical Therapy Time and Intention    Document Type evaluation  -SS     Mode of Treatment physical therapy  -       Row Name 24 1010          General Information    Patient Profile Reviewed yes  -SS     Prior Level of Function max assist:;transfer;bed mobility;dependent:;w/c or scooter  caregiver 5 days a week, 8 hours a day; assist required for bed mobility/transfers, pt. able to tolerate standing for hygiene; dependent for WC mobility  -     Existing Precautions/Restrictions fall;other (see comments)  RUE splint  -     Barriers to Rehab medically complex;previous functional deficit;cognitive status  -SS       Row Name 24 1010          Living Environment    People in Home facility resident  -SS       Row Name 24 1010          Home Main Entrance    Number of Stairs, Main Entrance none  -SS       Row Name 24 1010          Stairs Within Home, Primary    Number of Stairs, Within Home, Primary none  -SS       Row Name 24 1010          Cognition    Orientation Status (Cognition) unable/difficult to  assess  limited verbal communication this date; verified identity via wrist bracelet  -       Row Name 04/25/24 1010          Safety Issues, Functional Mobility    Safety Issues Affecting Function (Mobility) ability to follow commands;awareness of need for assistance;insight into deficits/self-awareness;judgment;problem-solving;safety precaution awareness;safety precautions follow-through/compliance;sequencing abilities  -     Impairments Affecting Function (Mobility) balance;cognition;coordination;endurance/activity tolerance;motor control;postural/trunk control;range of motion (ROM);strength  -SS     Cognitive Impairments, Mobility Safety/Performance awareness, need for assistance;insight into deficits/self-awareness;problem-solving/reasoning;judgment;safety precaution awareness;safety precaution follow-through;sequencing abilities  -               User Key  (r) = Recorded By, (t) = Taken By, (c) = Cosigned By      Initials Name Provider Type    Lexi Doll PT Physical Therapist                   Mobility       Row Name 04/25/24 1013          Bed Mobility    Comment, (Bed Mobility) sitting EOB w/OT  -       Row Name 04/25/24 1013          Bed-Chair Transfer    Bed-Chair [place] (Transfers) maximum assist (25% patient effort);2 person assist;verbal cues;nonverbal cues (demo/gesture)  -     Assistive Device (Bed-Chair Transfers) other (see comments)  BUE support  -     Comment, (Bed-Chair Transfer) V/TC for sequencing  -       Row Name 04/25/24 1013          Gait/Stairs (Locomotion)    [place] Level (Gait) not tested  -     Comment, (Gait/Stairs) non ambulatory at baseline  -               User Key  (r) = Recorded By, (t) = Taken By, (c) = Cosigned By      Initials Name Provider Type    Lexi Doll PT Physical Therapist                   Obj/Interventions       Row Name 04/25/24 1013          Range of Motion Comprehensive    General Range of Motion bilateral lower extremity  ROM WFL  -     Comment, General Range of Motion LLE mildly impaired ~25% w/increased tone noted  -       Row Name 04/25/24 1013          Strength Comprehensive (MMT)    Comment, General Manual Muscle Testing (MMT) Assessment limited ability to formally assess due to cognitive status; per functional mobility, gross 3/5  -       Row Name 04/25/24 1013          Motor Skills    Motor Skills muscle tone  -     Muscle Tone left;lower extremity(s);mild impairment;hypertonia;clonus  -       Row Name 04/25/24 1013          Balance    Balance Assessment sitting static balance;sitting dynamic balance;sit to stand dynamic balance;standing static balance;standing dynamic balance  -     Static Sitting Balance minimal assist  -     Dynamic Sitting Balance moderate assist  -     Position, Sitting Balance unsupported;sitting in chair  -     Static Standing Balance maximum assist;2-person assist  -     Dynamic Standing Balance maximum assist;2-person assist  -SS     Position/Device Used, Standing Balance supported;other (see comments)  BUE support  -     Balance Interventions sitting;standing;sit to stand;supported;static;dynamic  -[facility] Name 04/25/24 1013          Sensory Assessment (Somatosensory)    Sensory Assessment (Somatosensory) unable/difficult to assess  -               User Key  (r) = Recorded By, (t) = Taken By, (c) = Cosigned By      Initials Name Provider Type     Lexi Caceres, PT Physical Therapist                   Goals/Plan       [facility] Name 04/25/24 1018          Bed Mobility Goal 1 (PT)    Activity/Assistive Device (Bed Mobility Goal 1, PT) bed mobility activities, all  -     [place] Level/Cues Needed (Bed Mobility Goal 1, PT) moderate assist (50-74% patient effort)  -     Time Frame (Bed Mobility Goal 1, PT) long term goal (LTG);10 days  -       Row Name 04/25/24 1018          Transfer Goal 1 (PT)    Activity/Assistive Device (Transfer Goal 1, PT)  sit-to-stand/stand-to-sit;bed-to-chair/chair-to-bed  -     [place] Level/Cues Needed (Transfer Goal 1, PT) moderate assist (50-74% patient effort)  -     Time Frame (Transfer Goal 1, PT) long term goal (LTG);10 days  -       Row Name 04/25/24 1018          Therapy Assessment/Plan (PT)    Planned Therapy Interventions (PT) balance training;bed mobility training;home exercise program;motor coordination training;neuromuscular re-education;patient/family education;postural re-education;ROM (range of motion);strengthening;stretching;transfer training  -               User Key  (r) = Recorded By, (t) = Taken By, (c) = Cosigned By      Initials Name Provider Type     Lexi Caceres, PT Physical Therapist                   Clinical Impression       [facility] Name 04/25/24 1015          Pain    Pain Intervention(s) Ambulation/increased activity;Repositioned;Elevated  -     Additional Documentation Pain Scale: FACES Pre/Post-Treatment (Group)  -[facility] Name 04/25/24 1015          Pain Scale: FACES Pre/Post-Treatment    Pain: FACES Scale, Pretreatment 0-->no hurt  -     Posttreatment Pain Rating 0-->no hurt  -[facility] Name 04/25/24 1015          Plan of Care Review    Plan of Care Reviewed With patient;caregiver  -     Outcome Evaluation Pt. presents below baseline function w/generalized weakness and balance deficits affecting her ability to safely participate in functional mobility. She performed transfers w/max assist of 2. Activity limited by impaired cognitive status. Pt. would benefit from IPPT to address stated deficits.  -       Row Name 04/25/24 1015          Therapy Assessment/Plan (PT)    Rehab Potential (PT) fair, will monitor progress closely  -     Criteria for Skilled Interventions Met (PT) yes;meets criteria;skilled treatment is necessary  -     Therapy Frequency (PT) 3 times/wk  -       Row Name 04/25/24 1015          Vital Signs    Pre Systolic BP Rehab 135  -SS     Pre  Treatment Diastolic BP 85  -SS     Pretreatment Heart Rate (beats/min) 69  -SS     Pre SpO2 (%) 100  -SS     O2 Delivery Pre Treatment nasal cannula  2.5L  -SS     Pre Patient Position Supine  -       Row Name 04/25/24 1015          Positioning and Restraints    Pre-Treatment Position in bed  -SS     Post Treatment Position chair  -SS     In Chair notified nsg;reclined;call light within reach;encouraged to call for assist;exit alarm on;with family/caregiver;on mechanical lift sling;LUE elevated;heels elevated;legs elevated;waffle cushion  -               User Key  (r) = Recorded By, (t) = Taken By, (c) = Cosigned By      Initials Name Provider Type     Lexi Caceres, PT Physical Therapist                   Outcome Measures       Row Name 04/25/24 1019          How much help from another person do you currently need...    Turning from your back to your side while in flat bed without using bedrails? 2  -SS     Moving from lying on back to sitting on the side of a flat bed without bedrails? 2  -SS     Moving to and from a bed to a chair (including a wheelchair)? 2  -SS     Standing up from a chair using your arms (e.g., wheelchair, bedside chair)? 2  -SS     Climbing 3-5 steps with a railing? 1  -SS     To walk in hospital room? 1  -SS     AM-PAC 6 Clicks Score (PT) 10  -     Highest Level of Mobility Goal 4 --> Transfer to chair/commode  -       Row Name 04/25/24 1019          Modified [place] Scale    Pre-Stroke Modified [place] Scale 6 - Unable to determine (UTD) from the medical record documentation  -     Modified [place] Scale 4 - Moderately severe disability.  Unable to walk without assistance, and unable to attend to own bodily needs without assistance.  -       Row Name 04/25/24 1019          Functional Assessment    Outcome Measure Options AM-PAC 6 Clicks Basic Mobility (PT);Modified [place]  -               User Key  (r) = Recorded By, (t) = Taken By, (c) = Cosigned By      Initials Name  Provider Type     Lexi Caceres, PT Physical Therapist                                 Physical Therapy Education       Title: PT OT SLP Therapies (In Progress)       Topic: Physical Therapy (In Progress)       Point: Mobility training (Done)       Learning Progress Summary             Patient Acceptance, E, DU,NR,NL,VU by  at 4/25/2024 1020    Comment: Educated pt./CG safety/technique w/transfers, PT POC, adapations to decrease CG burden   Caregiver Acceptance, E, DU,NR,NL,VU by  at 4/25/2024 1020    Comment: Educated pt./CG safety/technique w/transfers, PT POC, adapations to decrease CG burden                         Point: Home exercise program (Not Started)       Learner Progress:  Not documented in this visit.              Point: Body mechanics (Done)       Learning Progress Summary             Patient Acceptance, E, DU,NR,NL,VU by  at 4/25/2024 1020    Comment: Educated pt./CG safety/technique w/transfers, PT POC, adapations to decrease CG burden   Caregiver Acceptance, E, DU,NR,NL,VU by  at 4/25/2024 1020    Comment: Educated pt./CG safety/technique w/transfers, PT POC, adapations to decrease CG burden                         Point: Precautions (Done)       Learning Progress Summary             Patient Acceptance, E, DU,NR,NL,VU by  at 4/25/2024 1020    Comment: Educated pt./CG safety/technique w/transfers, PT POC, adapations to decrease CG burden   Caregiver Acceptance, E, DU,NR,NL,VU by  at 4/25/2024 1020    Comment: Educated pt./CG safety/technique w/transfers, PT POC, adapations to decrease CG burden                                         User Key       Initials Effective Dates Name Provider Type Discipline     06/01/21 -  Lexi Caceres PT Physical Therapist PT                  PT Recommendation and Plan  Planned Therapy Interventions (PT): balance training, bed mobility training, home exercise program, motor coordination training, neuromuscular re-education, patient/family education,  postural re-education, ROM (range of motion), strengthening, stretching, transfer training  Plan of Care Reviewed With: patient, caregiver  Outcome Evaluation: Pt. presents below baseline function w/generalized weakness and balance deficits affecting her ability to safely participate in functional mobility. She performed transfers w/max assist of 2. Activity limited by impaired cognitive status. Pt. would benefit from IPPT to address stated deficits.     Time Calculation:   PT Evaluation Complexity  History, PT Evaluation Complexity: 3 or more personal factors and/or comorbidities  Examination of Body Systems (PT Eval Complexity): total of 4 or more elements  Clinical Presentation (PT Evaluation Complexity): evolving  Clinical Decision Making (PT Evaluation Complexity): low complexity  Overall Complexity (PT Evaluation Complexity): low complexity     PT Charges       Row Name 04/25/24 1021             Time Calculation    Start Time 0942  -SS      PT Received On 04/25/24  -SS      PT Goal Re-Cert Due Date 05/05/24  -SS         Untimed Charges    PT Eval/Re-eval Minutes 48  -SS         Total Minutes    Untimed Charges Total Minutes 48  -SS       Total Minutes 48  -SS                User Key  (r) = Recorded By, (t) = Taken By, (c) = Cosigned By      Initials Name Provider Type    SS Lexi Caceres, PT Physical Therapist                  Therapy Charges for Today       Code Description Service Date Service Provider Modifiers Qty    [id number]  PT EVAL LOW COMPLEXITY 4 4/25/2024 Lexi Caceres, PT GP 1            PT G-Codes  Outcome Measure Options: AM-PAC 6 Clicks Basic Mobility (PT), Modified [place]  AM-PAC 6 Clicks Score (PT): 10  Modified [place] Scale: 4 - Moderately severe disability.  Unable to walk without assistance, and unable to attend to own bodily needs without assistance.  PT Discharge Summary  Anticipated Discharge Disposition (PT): home with 24/7 care, extended care facility, other (see comments)  (return to previous facility w/CG support)    Lexi Caceres, PT  4/25/2024

## 2024-04-25 NOTE — NURSING NOTE
"                             Wound, Ostomy and Continence (WOC) Note    Reason for WOC Consultation: Pressure injury coccyx, POA    Patient awake, nonverbal.  Family/support person at bedside.   Subjective: Patient support person states,\" we tried to keep her turned but when she is in the wheelchair she is unable to turn herself.\"    Skin/Wound Assessment:     Patient has a very protruding coccyx bone.  Skin over this area and surrounding areas are intact pink and blanchable.  No pressure injury identified at this time.  However, patient is at very high risk for pressure injury development.    Image:     Summary and Recommendation(s):     No pressure injuries identified.  2.   Patient is at very high risk for pressure injury development.  Please apply and maintain Allevyn foam dressings for entire hospital duration  3.   Keep patient turned and pressure points offloaded.  4.   Refer to wound care instructions in the \"Orders\" tab  5.   Specialty support surface in place: Foam Mattress with Waffle Overlay     a. Will order Isotour with CAMPOS pump from [employer]      Pressure Injury Prevention Protocol (initiate for a Cristian Scale Score of <18):     Most recent Cristian Scale score:  Sensory Perception: 2-->very limited  Moisture: 3-->occasionally moist  Activity: 2-->chairfast  Mobility: 2-->very limited  Nutrition: 2-->probably inadequate  Friction and Shear: 2-->potential problem  Cristian Score: 13 (04/24/24 2030)       -Apply Allevyn foam dressing to sacrum/coccyx and heels.     -Turn q 2 hr. using an offloading foam wedge/pillow.    -Apply moisture barrier cream to bottom BID & PRN, if incontinent.      Thank you for consulting the WOC Nurse.  WOC Team will sign off.  Please re consult if the wound(s) worsens.     Lukas Patiño RN, BSN, CCRN, CWOCN  Wound, Ostomy and Continence (WOC) Department  [facility]          "

## 2024-04-25 NOTE — CASE MANAGEMENT/SOCIAL WORK
Discharge Planning Assessment  [facility]     Patient Name: Rolanda Damon  MRN: [id number]  Today's Date: 4/25/2024    Admit Date: 4/24/2024    Plan: [facility]   Discharge Needs Assessment       Row Name 04/25/24 1002       Living Environment    People in Home facility resident  [facility]    Current Living Arrangements extended care facility    Potentially Unsafe Housing Conditions none    Primary Care Provided by self  [facility]    Provides Primary Care For no one, unable/limited ability to care for self    Family Caregiver if Needed child(carlotta), adult    Family Caregiver Names Augusta Cortes    Quality of Family Relationships supportive    Able to Return to Prior Arrangements yes       Transition Planning    Patient/Family Anticipates Transition to long-term care facility       Discharge Needs Assessment    Readmission Within the Last 30 Days no previous admission in last 30 days    Equipment Currently Used at Home wheelchair                   Discharge Plan       Row Name 04/25/24 1005       Plan    Plan [facility]    Patient/Family in Agreement with Plan yes    Plan Comments Spoke with Ms. Damon and Sitter at the bedside. She lives at [facility] in [facility] [place]. She needs assistance with ADL's. She uses a wheelchair. She does not use oxygen or home health. Her Daughter Augusta Cortes is her POA and she has a living will. Her PCP is Shayna Og and she has Medicare A and B and [employer] insurance. Discharge plan is back to [facility]. CM will continue to follow for discharge needs.    Final Discharge Disposition Code 04 - intermediate care facility                  Continued Care and Services - Admitted Since 4/24/2024    No active coordination exists for this encounter.       Expected Discharge Date and Time       Expected Discharge Date Expected Discharge Time    Apr 27, 2024            Demographic Summary       Row Name 04/25/24 1000        General Information    Admission Type observation    Arrived From [facility]    Referral Source admission list    Reason for Consult discharge planning    Preferred Language English       Contact Information    Permission Granted to Share Info With                    Functional Status       Row Name 04/25/24 1001       Functional Status, IADL    Medications assistive person  [facility]    Meal Preparation assistive person  [facility]    Housekeeping assistive person  [facility]    Laundry assistive person  [facility]    Shopping assistive person  [facility]       Mental Status    General Appearance WDL WDL                   Psychosocial    No documentation.                  Abuse/Neglect    No documentation.                  Legal    No documentation.                  Substance Abuse    No documentation.                  Patient Forms    No documentation.                     Libertad Chan RN

## 2024-04-25 NOTE — MBS/VFSS/FEES
Acute Care - Speech Language Pathology   Swallow Initial Evaluation  Joan  Modified Barium Swallow Study (MBS)     Patient Name: Rolanda Damon  : 1950  MRN: [id number]  Today's Date: 2024               Admit Date: 2024    Visit Dx:     ICD-10-CM ICD-9-CM   1. Altered mental status, unspecified altered mental status type  R41.82 780.97   2. Dysarthria  R47.1 784.51   3. Oropharyngeal dysphagia  R13.12 787.22   4. Cognitive communication deficit  R41.841 799.52     Patient Active Problem List   Diagnosis    Dementia without behavioral disturbance    Facial droop    Type 2 diabetes mellitus    History of breast cancer    Personal history of transient ischemic attack (TIA), and cerebral infarction without residual deficits    Thrombocytopenia     Past Medical History:   Diagnosis Date    Breast cancer     Dementia     TIA (transient ischemic attack)      History reviewed. No pertinent surgical history.    SLP Recommendation and Plan  SLP Swallowing Diagnosis: moderate, oral dysphagia, mod-severe, pharyngeal dysphagia (24)  SLP Diet Recommendation: other (see comments) (Feeding tube may not be indicated given hx dementia. Consider safest possible PO diet option (pureed diet, pudding-thick liquids w/ ice chips b/t meals) or full comfort diet (soft/chopped solids, thin liquids), pending GOC/per physician discretion.) (24)     SLP Rec. for Method of Medication Administration: meds via alternate route (consider crushing w/ puree if PO option needed/ok'd by physician) (24)     Swallow Criteria for Skilled Therapeutic Interventions Met: demonstrates skilled criteria (24)  Anticipated Discharge Disposition (SLP): extended care facility (24)  Rehab Potential/Prognosis, Swallowing: re-evaluate goals as necessary (24)  Therapy Frequency (Swallow): 5 days per week (24)  Predicted Duration Therapy Intervention (Days): until  discharge (04/25/24 1420)  Oral Care Recommendations: Oral Care BID/PRN, Suction toothbrush (04/25/24 1420)  Demonstrates Need for Referral to Another Service: palliative care services, other (see comments) (as appropriate per physician discretion) (04/25/24 1420)             Plan of Care Reviewed With: patient, caregiver, guardian    ADDENDUM 4/25 15:50: Spoke to Dr. Boswell who ok'd pureed diet and pudding-thick liquids. Spoke to [employer], and recommended: 1:1 assist w/ meals, small bites/sips, oral suctioning PRN, meds crushed w/ puree, and ice chips between meals w/ supervision as tolerated.       SWALLOW EVALUATION (Last 72 Hours)       SLP Adult Swallow Evaluation       Row Name 04/25/24 1420 04/25/24 0900                Rehab Evaluation    Document Type evaluation  -AC evaluation  -AC       Subjective Information no complaints  -AC no complaints  -AC       Patient Observations alert;cooperative  -AC alert;cooperative  -AC       Patient/Family/Caregiver Comments/Observations No family/sitter present.  -AC Hired sitter/caregiver present.  -AC       Patient Effort adequate  -AC adequate  -AC          General Information    Patient Profile Reviewed yes  -AC yes  -AC       Pertinent History Of Current Problem See previous eval.  -AC Decreased responsiveness, R facial droop/speech difficulty x2 days per chart (though caregiver said speech difficulty for couple weeks). Hx dementia, TIA, breast CA s/p mastectomy, sz per caregiver.  -AC       Current Method of Nutrition NPO  -AC NPO  -AC       Precautions/Limitations, Vision -- neglect, right  -AC       Precautions/Limitations, Hearing -- WFL;for purposes of eval  -AC       Prior Level of Function-Communication -- cognitive-linguistic impairment  -AC       Prior Level of Function-Swallowing -- soft to chew;whole;thin liquids;safe, efficient swallowing in all situations  -AC       Plans/Goals Discussed with patient  -AC patient;other (see comments)  sitter/caregiver &  legal guardian (via phone)--agreed upon  -       Barriers to Rehab cognitive status  - cognitive status  -AC       Patient's Goals for Discharge patient could not state  - patient could not state  -       Family Goals for Discharge -- patient able to eat/drink without coughing/choking  -          Pain    Additional Documentation -- Pain Scale: FACES Pre/Post-Treatment (Group)  -          Pain Scale: FACES Pre/Post-Treatment    Pain: FACES Scale, Pretreatment 0-->no hurt  -AC 0-->no hurt  -AC       Posttreatment Pain Rating 0-->no hurt  -AC 0-->no hurt  -AC          Oral Motor Structure and Function    Dentition Assessment -- missing teeth  no upper dentition/dentures  -       Secretion Management -- dried secretions in oral cavity  not witnessed during eval, but caregiver reported noting drooling recently  -       Mucosal Quality -- dry  -AC          Oral Musculature and Cranial Nerve Assessment    Oral Motor General Assessment -- unable to assess  2' cog status  -       Oral Labial or Buccal Impairment, Detail, Cranial Nerve VII (Facial): -- right labial droop;other (see comments)  noted @ rest  -          General Eating/Swallowing Observations    Respiratory Support Currently in Use -- nasal cannula  -AC       O2 Liters -- 3L  -AC       Eating/Swallowing Skills fed by SLP  -AC fed by SLP;fed by staff/caregiver  -       Positioning During Eating upright 90 degree;upright in chair  - upright 90 degree;upright in bed  -       Utensils Used -- spoon;cup;straw  -       Consistencies Trialed -- thin liquids;nectar/syrup-thick liquids;pureed  -          Clinical Swallow Eval    Oral Prep Phase -- impaired  -AC       Oral Transit -- impaired  -AC       Oral Residue -- impaired  -AC       Pharyngeal Phase -- suspected pharyngeal impairment  -AC       Esophageal Phase -- unremarkable  -AC          Oral Prep Concerns    Oral Prep Concerns -- incomplete or weak lip closure around spoon;oral  holding  -AC       Oral Holding -- all consistencies  -AC       Incomplete or Weak Lip Closure Around Spoon -- all consistencies  -AC          Oral Transit Concerns    Oral Transit Concerns -- increased oral transit time  -AC       Increased Oral Transit Time -- all consistencies  -AC          Oral Residue Concerns    Oral Residue Concerns -- diffuse residue throughout oral cavity  -AC       Diffuse Residue Throughout Oral Cavity -- pudding  -AC       Oral Residue Concerns, Comment -- Mild  -AC          Pharyngeal Phase Concerns    Pharyngeal Phase Concerns -- cough;throat clear;wet vocal quality  -AC       Pharyngeal Phase Concerns, Comment -- Delayed coughing, throat clearing, & wet vocal quality a couple minutes following PO trials. Seemingly weak/poorly coordinated swallow per laryngeal palpation.  -AC          MBS/VFSS    Utensils Used spoon;cup;straw  -AC --       Consistencies Trialed thin liquids;nectar/syrup-thick liquids;honey-thick liquids;pudding thick;soft to chew textures;chopped  -AC --          MBS/VFSS Interpretation    Oral Prep Phase impaired oral phase of swallowing  -AC --       Oral Transit Phase impaired  -AC --       Oral Residue impaired  -AC --          Oral Preparatory Phase    Oral Preparatory Phase reduced lip opening;prolonged manipulation  -AC --       Reduced Lip Opening all consistencies tested;secondary to impaired cognitive status  -AC --       Prolonged Manipulation mechanical soft;secondary to reduced lingual strength;other (see comments)  -AC --          Oral Transit Phase    Impaired Oral Transit Phase premature spillage of liquids into pharynx;increased A-P transit time;delayed initiation of bolus transit  -AC --       Delayed Initiation of bolus transit all consistencies tested;secondary to impaired cognitive status  -AC --       Increased A-P Transit Time all consistencies tested;secondary to impaired cognitive status;discoordination of lingual movement  -AC --        Premature Spillage of Liquids into Pharynx thin liquids;secondary to reduced lingual control  -AC --          Oral Residue    Impaired Oral Residue lingual residue  -AC --       Lingual Residue all consistencies tested;secondary to reduced lingual strength  -AC --       Response to Oral Residue partial residue clearance;with spontaneous subsequent swallow  -AC --       Oral Residue, Comment Mild-mod  -AC --          Initiation of Pharyngeal Swallow    Initiation of Pharyngeal Swallow bolus in pyriform sinuses  -AC --       Pharyngeal Phase impaired pharyngeal phase of swallowing  -AC --       Penetration Before the Swallow thin liquids;secondary to reduced back of tongue control;secondary to delayed swallow initiation or mistiming  -AC --       Penetration During the Swallow thin liquids;nectar-thick liquids;honey-thick liquids;secondary to delayed swallow initiation or mistiming  -AC --       Aspiration During the Swallow thin liquids;nectar-thick liquids;secondary to delayed swallow initiation or mistiming  -AC --       Aspiration After the Swallow thin liquids;nectar-thick liquids;honey-thick liquids;secondary to residue;in laryngeal vestibule  -AC --       Depth of Penetration other (see comments)  initially shallow/transient, but became deep/non-transient as study progressed  -AC --       Response to Penetration No  -AC --       No spontaneous response to penetration and could not produce cough response despite cue  -AC --       Response to Aspiration Yes  -AC --       Responded to aspiration with inconsistent;throat clear;non-effective  silent majority of the time  -AC --       Rosenbek's Scale thin:;nectar:;honey:;8--->level 8  -AC --       Pharyngeal Residue thin liquids;nectar-thick liquids;honey-thick liquids;base of tongue;valleculae;secondary to reduced base of tongue retraction;secondary to reduced posterior pharyngeal wall stripping;other (see comments)  mild  -AC --       Response to Residue unable to  clear residue  -AC --       Attempted Compensatory Maneuvers bolus size;bolus presentation style  limited by pt's cognitive status  -AC --       Response to Attempted Compensatory Maneuvers did not prevent aspiration  -AC --       Pharyngeal Phase, Comment Fatigued over course of this study and eventually aspirated thin, nectar, and honey-thick liquids. General risk for aspiration of pudding/solids 2' fatigue/cognitive status.  -AC --          Swallowing Quality of Life Assessment    Education and counseling provided -- Signs of aspiration;Risks of aspiration  -AC          SLP Evaluation Clinical Impression    SLP Swallowing Diagnosis moderate;oral dysphagia;mod-severe;pharyngeal dysphagia  -AC suspected pharyngeal dysphagia;oral dysphagia  -AC       Functional Impact risk of aspiration/pneumonia;risk of malnutrition;risk of dehydration  -AC risk of aspiration/pneumonia;risk of malnutrition;risk of dehydration  -AC       Rehab Potential/Prognosis, Swallowing re-evaluate goals as necessary  -AC re-evaluate goals as necessary  -AC       Swallow Criteria for Skilled Therapeutic Interventions Met demonstrates skilled criteria  -AC demonstrates skilled criteria  -AC          Recommendations    Therapy Frequency (Swallow) 5 days per week  -AC --       Predicted Duration Therapy Intervention (Days) until discharge  -AC --       SLP Diet Recommendation other (see comments)  Feeding tube may not be indicated given hx dementia. Consider safest possible PO diet option (pureed diet, pudding-thick liquids w/ ice chips b/t meals) or full comfort diet (soft/chopped solids, thin liquids), pending GOC/per physician discretion.  -AC NPO  -AC       Recommended Diagnostics -- VFSS (MBS)  discussed w/ pt's legal guardian who would like to proceed w/ MBS to determine safest diet for when pt returns to LTC  -AC       Oral Care Recommendations Oral Care BID/PRN;Suction toothbrush  -AC Oral Care BID/PRN;Suction toothbrush  -AC       SLP  Rec. for Method of Medication Administration meds via alternate route  consider crushing w/ puree if PO option needed/ok'd by physician  -AC meds via alternate route  -AC       Anticipated Discharge Disposition (SLP) extended care facility  - extended care facility  -AC       Demonstrates Need for Referral to Another Service palliative care services;other (see comments)  as appropriate per physician discretion  -AC --                 User Key  (r) = Recorded By, (t) = Taken By, (c) = Cosigned By      Initials Name Effective Dates     Marnie Velázquez MS [facility]-SLP 02/03/23 -                     EDUCATION  The patient has been educated in the following areas:   Dysphagia (Swallowing Impairment).        SLP GOALS       Row Name 04/25/24 1420 04/25/24 5467          (LTG) Patient will demonstrate functional swallow for    Diet Texture (Demonstrate functional swallow) mechanical ground textures  -AC --     Liquid viscosity (Demonstrate functional swallow) thin liquids  -AC --     [place] (Demonstrate functional swallow) with 1:1 assist/ supervision  -AC --     Time Frame (Demonstrate functional swallow) 1 week  -AC --        (STG) Patient will tolerate trials of    Consistencies Trialed (Tolerate trials) pureed textures;pudding/ extremely thick liquids  -AC --     Desired Outcome (Tolerate trials) without signs/symptoms of aspiration;without signs of distress;with adequate oral prep/transit/clearance  -AC --     [place] (Tolerate trials) with 1:1 assist/ supervision  -AC --     Time Frame (Tolerate trials) 1 week  -AC --        (STG) Patient will tolerate therapeutic trials of    Consistencies Trialed (Tolerate therapeutic trials) thin liquids;nectar/ mildly thick liquids  -AC --     Desired Outcome (Tolerate therapeutic trials) without signs/symptoms of aspiration;without signs of distress  mostly silent aspiration during MBS; exhibited delayed coughing/throat clear/wet vocal quality during CSE; check for  signs of improved cognition, timing, and coordination  - --     [place] (Tolerate therapeutic trials) with 1:1 assist/ supervision  -AC --     Time Frame (Tolerate therapeutic trials) 1 week  -AC --        (STG) Swallow Management Recall Goal 1 (SLP)    Activity (Swallow Management Recall Goal 1, SLP) recall of;aspiration precautions;oral care recommendations  caregiver recall  -AC --     [place]/Accuracy (Swallow Management Recall Goal 1, SLP) independently (over 90% accuracy)  - --     Time Frame (Swallow Management Recall Goal 1, SLP) 1 week  -AC --        Patient will demonstrate functional speech skills for return to discharge environment    [place] -- with maximum cues  -AC     Time frame -- 1 week  -AC        Patient Will Implement Strategies Goal 1 (SLP)    Implement Strategies of Goal 1 (SLP) -- using a gesture when requested;80%;with minimal cues (75-90%)  use of caregiver strategies (avoid open-ended questions, reduce loud background sounds, cue pt to slow down)  -AC     Time Frame (Strategy Implementation Goal 1, SLP) -- 1 week  -AC        Attention Goal 1 (SLP)    Improve Attention by Goal 1 (SLP) -- attending to task;70%;with moderate cues (50-74%)  attend to stimuli on R side  -AC     Time Frame (Attention Goal 1, SLP) -- 1 week  -               User Key  (r) = Recorded By, (t) = Taken By, (c) = Cosigned By      Initials Name Provider Type    Marnie Amado MS CCC-SLP Speech and Language Pathologist                       Time Calculation:    Time Calculation- SLP       Row Name 04/25/24 1517 04/25/24 1003          Time Calculation- SLP    SLP Start Time 1420  - 0900  -     SLP Received On 04/25/24  - 04/25/24  -        Untimed Charges    89044-[id number] Eval Speech and Production w/ Language Minutes -- 25  -AC     52930-YR Eval Oral Pharyng Swallow Minutes -- 45  -     71519-MJ Motion Fluoro Eval Swallow Minutes 64  - --        Total Minutes    Untimed Charges Total  Minutes 64  -AC 70  -AC      Total Minutes 64  -AC 70  -AC               User Key  (r) = Recorded By, (t) = Taken By, (c) = Cosigned By      Initials Name Provider Type    Marnie Amado MS CCC-SLP Speech and Language Pathologist                    Therapy Charges for Today       Code Description Service Date Service Provider Modifiers Qty    74499169957 HC ST EVAL ORAL PHARYNG SWALLOW 3 4/25/2024 Marnie Velázquez, MS CCC-SLP GN 1    06654177209 HC ST EVAL SPEECH AND PROD W LANG  2 4/25/2024 Marnie Velázquez, MS CCC-SLP GN 1    02219335140 HC ST MOTION FLUORO EVAL SWALLOW 4 4/25/2024 Marnie Velázquez, MS CCC-SLP GN 1                 Marnie Velázquez MS CCC-SLP  4/25/2024

## 2024-04-25 NOTE — PLAN OF CARE
Goal Outcome Evaluation:  Plan of Care Reviewed With: patient, caregiver, guardian                  Anticipated Discharge Disposition (SLP): extended care facility    SLP Diagnosis: moderate-severe, dysarthria (04/25/24 0915)  SLP Diagnosis Comments: Suspect severe cognitive-linguistic deficits, though difficult to fully assess 2' severity of dysarthria. Pt's sitter/caregiver reported that pt is generally less communicative than normal at present, but feels that cognitive-linguistic skills close to baseline. Dysarthria is acute per her report. (04/25/24 0915)  SLP Swallowing Diagnosis: suspected pharyngeal dysphagia, oral dysphagia (04/25/24 0900)

## 2024-04-25 NOTE — PROGRESS NOTES
Stroke Neurology Progress Note     Subjective     This patient was seen in follow-up for: encephalopathy   Present for the encounter were: self, patient, patient's caregiver    Subjective:  Admitted overnight. Neurologic exam notable for lethargy, patient does not follow commands, nonverbal, diffuse weakness.  The patient's caregiver shows me a video of the patient at baseline in which she is interactive, conversant, and moving both upper extremities.  She uses a wheelchair at baseline and requires assistance with cooking, bathing, toileting, driving, and finances.  Patient has a history of generalized tonic-clonic seizures currently not on medication per caregiver since levetiracetam caused excess fatigue.    Objective      Temp:  [97.2 °F (36.2 °C)-97.6 °F (36.4 °C)] 97.5 °F (36.4 °C)  Heart Rate:  [62-83] 64  Resp:  [16] 16  BP: (123-157)/(75-97) 135/85            Objective    Physical Exam:  General Appearance: Lethargic, cachetic   HEENT: anicteric sclera, no scleral injection  Lungs: respirations appear comfortable, no obvious increased work of breathing  Extremities: No cyanosis or fingernail clubbing   Skin: No rashes in exposed skin areas     Neurological Examination:   Mental status: Lethargic. Nonverbal. Does not follow commands.   Cranial Nerves: Bilateral blink to threat.  Leftward gaze.  Face appears grossly symmetric.  Sensory: No response to light touch in all extremities.  Motor:   Strength:  LUE: 3/5 with drift  LLE: 2/5 with noxious stimuli  RUE: 3/5 with drift  RLE: 2/5 with noxious stimuli      Labs:    Lab Results   Component Value Date    HGBA1C 6.70 (H) 04/25/2024      Lab Results   Component Value Date    CHOL 167 04/25/2024    TRIG 86 04/25/2024    HDL 39 (L) 04/25/2024     (H) 04/25/2024       Lab Results   Component Value Date    WBC 5.83 04/24/2024    HGB 15.3 04/24/2024    HCT 45 04/24/2024    MCV 90.6 04/24/2024     (L) 04/24/2024     Lab Results   Component Value Date     GLUCOSE 139 (H) 04/24/2024    BUN 32 (H) 04/24/2024    CREATININE 0.90 04/24/2024    BCR 38.1 (H) 04/24/2024    CO2 32.0 (H) 04/24/2024    CALCIUM 9.6 04/24/2024    ALBUMIN 3.9 04/24/2024    AST 18 04/24/2024    AST 18 04/24/2024    ALT 14 04/24/2024    ALT 14 04/24/2024       Results from last 7 days   Lab Units 04/24/24  1428 04/24/24  1423   SODIUM mmol/L  --  141   POTASSIUM mmol/L  --  4.0   CHLORIDE mmol/L  --  101   CO2 mmol/L  --  32.0*   BUN mg/dL  --  32*   CREATININE mg/dL 0.90 0.84   CALCIUM mg/dL  --  9.6   BILIRUBIN mg/dL  --  0.7   ALK PHOS U/L  --  82   ALT (SGPT) U/L  --  14  14   AST (SGOT) U/L  --  18  18   GLUCOSE mg/dL  --  139*         Results Review:      All images and reports were personally reviewed and I agree with the interpretations except as noted below.      EEG 4/25/2024  Impression: Diffuse cerebral dysfunction of moderate degree, nonspecific.  These findings are commonly seen with encephalopathy due to toxic/metabolic cause No evidence for epilepsy is present.    CT Angiogram Head and Neck 4/24/2024  Impression: Essentially normal CTA of the head and neck.    CT Head Without Contrast 4/24/2024  Age-related changes of the brain as above, otherwise without evidence of acute intracranial abnormality.     Transthoracic echocardiogram 4/24/2024  Impression:    Left ventricular ejection fraction appears to be 46 - 50%.    Left ventricular wall thickness is consistent with hypertrophy.    Mild aortic valve regurgitation is present.    Saline test results are negative.            Assessment/Plan     Assessment:    # Encephalopathy   Etiology is unclear. The patient has a history of seizures and leftward gaze deviation.  Cannot rule out seizure.  Per report of her caregiver, patient previously took levetiracetam however it caused excess fatigue.  Will prescribe briviact.  EEG negative.  CTH 4/20/2024 normal.  MRI pending.  Differential diagnosis includes vascular etiologies and progressive  dementia.      Stroke neurology will follow results of MRI.  If negative for acute infarct, recommend please consult general neurology.      Yasmeen Bautista MD  [facility] STROKE NEURO  04/25/24  13:54 EDT    Addendum 4/27/2024 at 5:34 PM.  Reviewed MRI brain with evidence of acute stroke.  Stroke neurology will follow.

## 2024-04-25 NOTE — THERAPY EVALUATION
Patient Name: Rolanda Damon  : 1950    MRN: [id number]                              Today's Date: 2024       Admit Date: 2024    Visit Dx:     ICD-10-CM ICD-9-CM   1. Altered mental status, unspecified altered mental status type  R41.82 780.97   2. Dysarthria  R47.1 784.51   3. Dysphagia, unspecified type  R13.10 787.20   4. Cognitive communication deficit  R41.841 799.52     Patient Active Problem List   Diagnosis    Dementia without behavioral disturbance    Facial droop    Type 2 diabetes mellitus    History of breast cancer    Personal history of transient ischemic attack (TIA), and cerebral infarction without residual deficits    Thrombocytopenia     Past Medical History:   Diagnosis Date    Breast cancer     Dementia     TIA (transient ischemic attack)      History reviewed. No pertinent surgical history.   General Information       Row Name 24 0957          OT Time and Intention    Document Type evaluation  -MR     Mode of Treatment occupational therapy  -MR       Row Name 24 0957          General Information    Patient Profile Reviewed yes  -MR     Prior Level of Function max assist:;transfer;w/c or scooter;dependent:;ADL's;min assist:;feeding  Significant assist for transfer from bed to w/c at baseline from sitter. Sitter is present 5 days a wk for 8 hours a day. Pt typically able to stand for ADLs.  -MR     Existing Precautions/Restrictions fall;other (see comments)  RUE splint/palm protector  -MR     Barriers to Rehab medically complex;previous functional deficit;cognitive status  -MR       Row Name 24 0957          Living Environment    People in Home facility resident  -MR       Row Name 24 0957          Home Main Entrance    Number of Stairs, Main Entrance none  -MR       Row Name 24 0957          Stairs Within Home, Primary    Number of Stairs, Within Home, Primary none  -MR       Row Name 24 0957          Cognition    Orientation Status  (Cognition) unable/difficult to assess  -MR       Row Name 04/25/24 0957          Safety Issues, Functional Mobility    Safety Issues Affecting Function (Mobility) ability to follow commands;awareness of need for assistance;insight into deficits/self-awareness;judgment;problem-solving;safety precaution awareness;safety precautions follow-through/compliance;sequencing abilities  -MR     Impairments Affecting Function (Mobility) balance;cognition;coordination;endurance/activity tolerance;motor control;postural/trunk control;range of motion (ROM);strength  -MR     Cognitive Impairments, Mobility Safety/Performance awareness, need for assistance;insight into deficits/self-awareness;problem-solving/reasoning;judgment;safety precaution awareness;safety precaution follow-through;sequencing abilities  -MR               User Key  (r) = Recorded By, (t) = Taken By, (c) = Cosigned By      Initials Name Provider Type    MR Uzma Alfaro, OT Occupational Therapist                     Mobility/ADL's       Row Name 04/25/24 1050          Bed Mobility    Bed Mobility supine-sit  -MR     Supine-Sit [place] (Bed Mobility) maximum assist (25% patient effort);2 person assist  -MR     Assistive Device (Bed Mobility) head of bed elevated;draw sheet  -MR       Row Name 04/25/24 1050          Transfers    Transfers sit-stand transfer  -MR       Row Name 04/25/24 1050          Sit-Stand Transfer    Sit-Stand [place] (Transfers) maximum assist (25% patient effort);2 person assist  -MR     Assistive Device (Sit-Stand Transfers) other (see comments)  BUE support  -MR       Row Name 04/25/24 1050          Activities of Daily Living    BADL Assessment/Intervention lower body dressing;grooming  -MR       Row Name 04/25/24 1050          Lower Body Dressing Assessment/Training    [place] Level (Lower Body Dressing) don;socks;dependent (less than 25% patient effort)  -MR     Position (Lower Body Dressing) supine  -MR       Row Name  04/25/24 1050          Grooming Assessment/Training    [place] Level (Grooming) oral care regimen;dependent (less than 25% patient effort)  -MR     Position (Grooming) unsupported sitting  -MR               User Key  (r) = Recorded By, (t) = Taken By, (c) = Cosigned By      Initials Name Provider Type     Uzma Alfaro OT Occupational Therapist                   Obj/Interventions       Row Name 04/25/24 1051          Sensory Assessment (Somatosensory)    Sensory Assessment (Somatosensory) unable/difficult to assess  -MR       Row Name 04/25/24 1051          Vision Assessment/Intervention    Visual Impairment/Limitations unable/difficult to assess  -MR       Row Name 04/25/24 1051          Range of Motion Comprehensive    Comment, General Range of Motion Difficulty w/ formal assessment of BUE this date. R hand contracted for a few months per sitter w/ palm protector noted.  -MR       Row Name 04/25/24 1051          Strength Comprehensive (MMT)    Comment, General Manual Muscle Testing (MMT) Assessment BUE grossly 3+/5  -MR       Row Name 04/25/24 1051          Balance    Balance Assessment sitting static balance;sitting dynamic balance;standing static balance;standing dynamic balance  -MR     Static Sitting Balance minimal assist  -MR     Dynamic Sitting Balance moderate assist  -MR     Position, Sitting Balance unsupported;sitting edge of bed  -MR     Static Standing Balance maximum assist;2-person assist;verbal cues;non-verbal cues (demo/gesture)  -MR     Dynamic Standing Balance maximum assist;2-person assist;verbal cues;non-verbal cues (demo/gesture)  -MR     Position/Device Used, Standing Balance supported;other (see comments)  BUE support  -MR     Balance Interventions sitting;standing;sit to stand;supported;static;dynamic;minimal challenge;occupation based/functional task  -MR               User Key  (r) = Recorded By, (t) = Taken By, (c) = Cosigned By      Initials Name Provider Type    MR Alfaro  Uzma, OT Occupational Therapist                   Goals/Plan       Row Name 04/25/24 1101          Bed Mobility Goal 1 (OT)    Activity/Assistive Device (Bed Mobility Goal 1, OT) sit to supine  -MR     [place] Level/Cues Needed (Bed Mobility Goal 1, OT) moderate assist (50-74% patient effort)  -MR     Time Frame (Bed Mobility Goal 1, OT) long term goal (LTG);10 days  -MR     Progress/Outcomes (Bed Mobility Goal 1, OT) new goal  -MR       Row Name 04/25/24 1101          Transfer Goal 1 (OT)    Activity/Assistive Device (Transfer Goal 1, OT) sit-to-stand/stand-to-sit  -MR     [place] Level/Cues Needed (Transfer Goal 1, OT) moderate assist (50-74% patient effort)  -MR     Time Frame (Transfer Goal 1, OT) long term goal (LTG);10 days  -MR     Progress/Outcome (Transfer Goal 1, OT) new goal  -MR       Row Name 04/25/24 1101          Self-Feeding Goal 1 (OT)    Activity/Device (Self-Feeding Goal 1, OT) finger foods  -MR     [place] Level/Cues Needed (Self-Feeding Goal 1, OT) set-up required  -MR     Time Frame (Self-Feeding Goal 1, OT) long term goal (LTG);10 days  -MR     Progress/Outcomes (Self-Feeding Goal 1, OT) new goal  -MR               User Key  (r) = Recorded By, (t) = Taken By, (c) = Cosigned By      Initials Name Provider Type     Dominic Uzma, OT Occupational Therapist                   Clinical Impression       Row Name 04/25/24 1054          Pain Assessment    Pain Intervention(s) Ambulation/increased activity;Repositioned;Elevated  -MR       Row Name 04/25/24 1054          Pain Scale: FACES Pre/Post-Treatment    Pain: FACES Scale, Pretreatment 0-->no hurt  -MR     Posttreatment Pain Rating 0-->no hurt  -MR       Row Name 04/25/24 1054          Plan of Care Review    Plan of Care Reviewed With patient;caregiver  -MR     Progress no change  Initial Eval  -MR     Outcome Evaluation Pt presenting below her functional baseline requiring increased assist w/ transfers, bed mobility and ADLs.  Pt required significant assist for bed mobility. Pt limited by baseline cognitive status. IP OT warranted to address deficits. Recommend return to facility w/ sitter.  -MR       Row Name 04/25/24 1054          Therapy Assessment/Plan (OT)    Patient/Family Therapy Goal Statement (OT) Return to PLOF  -MR     Rehab Potential (OT) good, to achieve stated therapy goals  -MR     Criteria for Skilled Therapeutic Interventions Met (OT) yes;meets criteria;skilled treatment is necessary  -MR     Therapy Frequency (OT) 3 times/wk  -MR       Row Name 04/25/24 1054          Therapy Plan Review/Discharge Plan (OT)    Anticipated Discharge Disposition (OT) [facility] facility  -MR       Row Name 04/25/24 1054          Vital Signs    Pre Systolic BP Rehab 135  -MR     Pre Treatment Diastolic BP 85  -MR     Post Systolic BP Rehab 123  -MR     Post Treatment Diastolic BP 79  -MR     Pretreatment Heart Rate (beats/min) 66  -MR     Posttreatment Heart Rate (beats/min) 69  -MR     Pre SpO2 (%) 100  -MR     O2 Delivery Pre Treatment nasal cannula  -MR     Pre Patient Position Supine  -MR     Intra Patient Position Standing  -MR     Post Patient Position Sitting  -MR       Row Name 04/25/24 1054          Positioning and Restraints    Pre-Treatment Position in bed  -MR     Post Treatment Position bed  -MR     In Bed sitting EOB;with PT  -MR               User Key  (r) = Recorded By, (t) = Taken By, (c) = Cosigned By      Initials Name Provider Type    Uzma Kamara, OT Occupational Therapist                   Outcome Measures       Row Name 04/25/24 1107          How much help from another is currently needed...    Putting on and taking off regular lower body clothing? 1  -MR     Bathing (including washing, rinsing, and drying) 1  -MR     Toileting (which includes using toilet bed pan or urinal) 1  -MR     Putting on and taking off regular upper body clothing 1  -MR     Taking care of personal grooming (such as brushing teeth) 1   -MR     Eating meals 2  -MR     AM-PAC 6 Clicks Score (OT) 7  -MR       Row Name 04/25/24 1019          How much help from another person do you currently need...    Turning from your back to your side while in flat bed without using bedrails? 2  -SS     Moving from lying on back to sitting on the side of a flat bed without bedrails? 2  -SS     Moving to and from a bed to a chair (including a wheelchair)? 2  -SS     Standing up from a chair using your arms (e.g., wheelchair, bedside chair)? 2  -SS     Climbing 3-5 steps with a railing? 1  -SS     To walk in hospital room? 1  -SS     AM-PAC 6 Clicks Score (PT) 10  -SS     Highest Level of Mobility Goal 4 --> Transfer to chair/commode  -SS       Row Name 04/25/24 1103 04/25/24 1019       Modified [place] Scale    Pre-Stroke Modified [place] Scale 6 - Unable to determine (UTD) from the medical record documentation  -MR 6 - Unable to determine (UTD) from the medical record documentation  -SS    Modified [place] Scale 4 - Moderately severe disability.  Unable to walk without assistance, and unable to attend to own bodily needs without assistance.  -MR 4 - Moderately severe disability.  Unable to walk without assistance, and unable to attend to own bodily needs without assistance.  -      Row Name 04/25/24 1103 04/25/24 1019       Functional Assessment    Outcome Measure Options AM-PAC 6 Clicks Daily Activity (OT);Modified [place]  -MR AM-PAC 6 Clicks Basic Mobility (PT);Modified [place]  -SS              User Key  (r) = Recorded By, (t) = Taken By, (c) = Cosigned By      Initials Name Provider Type    SS Lexi Caceres, PT Physical Therapist    Uzma Kamara, OT Occupational Therapist                    Occupational Therapy Education       Title: PT OT SLP Therapies (In Progress)       Topic: Occupational Therapy (In Progress)       Point: ADL training (In Progress)       Description:   Instruct learner(s) on proper safety adaptation and remediation techniques during  self care or transfers.   Instruct in proper use of assistive devices.                  Learning Progress Summary             Patient Acceptance, E, NR by MR at 4/25/2024 1104   Other Acceptance, E, NR by MR at 4/25/2024 1104                         Point: Home exercise program (Not Started)       Description:   Instruct learner(s) on appropriate technique for monitoring, assisting and/or progressing therapeutic exercises/activities.                  Learner Progress:  Not documented in this visit.              Point: Precautions (In Progress)       Description:   Instruct learner(s) on prescribed precautions during self-care and functional transfers.                  Learning Progress Summary             Patient Acceptance, E, NR by MR at 4/25/2024 1104   Other Acceptance, E, NR by MR at 4/25/2024 1104                         Point: Body mechanics (In Progress)       Description:   Instruct learner(s) on proper positioning and spine alignment during self-care, functional mobility activities and/or exercises.                  Learning Progress Summary             Patient Acceptance, E, NR by MR at 4/25/2024 1104   Other Acceptance, E, NR by MR at 4/25/2024 1104                                         User Key       Initials Effective Dates Name Provider Type Discipline    MR 09/22/22 -  Uzma Alfaro OT Occupational Therapist OT                  OT Recommendation and Plan  Therapy Frequency (OT): 3 times/wk  Plan of Care Review  Plan of Care Reviewed With: patient, caregiver  Progress: no change (Initial Eval)  Outcome Evaluation: Pt presenting below her functional baseline requiring increased assist w/ transfers, bed mobility and ADLs. Pt required significant assist for bed mobility. Pt limited by baseline cognitive status. IP OT warranted to address deficits. Recommend return to facility w/ sitter.     Time Calculation:   Evaluation Complexity (OT)  Review Occupational Profile/Medical/Therapy History Complexity:  brief/low complexity  Assessment, Occupational Performance/Identification of Deficit Complexity: 1-3 performance deficits  Clinical Decision Making Complexity (OT): problem focused assessment/low complexity  Overall Complexity of Evaluation (OT): low complexity     Time Calculation- OT       Row Name 04/25/24 1105             Time Calculation- OT    OT Start Time 0930  -MR      OT Received On 04/25/24  -MR      OT Goal Re-Cert Due Date 05/05/24  -MR         Untimed Charges    OT Eval/Re-eval Minutes 46  -MR         Total Minutes    Untimed Charges Total Minutes 46  -MR       Total Minutes 46  -MR                User Key  (r) = Recorded By, (t) = Taken By, (c) = Cosigned By      Initials Name Provider Type    MR Uzma Alfaro OT Occupational Therapist                  Therapy Charges for Today       Code Description Service Date Service Provider Modifiers Qty    [id number] HC OT EVAL LOW COMPLEXITY 4 4/25/2024 Uzma Alfaro OT GO 1                 Uzma Alfaro OT  4/25/2024

## 2024-04-25 NOTE — CONSULTS
Diabetes Education    Patient Name:  Rolanda Damon  YOB: 1950  MRN: [id number]  Admit Date:  4/24/2024        Diabetes ed consult per stroke protocol. Chart reviewed; noted pt confused. We will cont to follow and  engage pt in education as appropriate. Call [phone] w/ interval needs.    Patient does not qualify for the follow up stroke class based on the exclusion criteria of BMI 21.9.      Electronically signed by:  Aarti Wyatt RN  04/25/24 13:07 EDT

## 2024-04-26 PROBLEM — I63.9 STROKE: Status: ACTIVE | Noted: 2024-04-26

## 2024-04-26 LAB
BACTERIA UR QL AUTO: ABNORMAL /HPF
BILIRUB UR QL STRIP: NEGATIVE
CLARITY UR: ABNORMAL
COLOR UR: ABNORMAL
D-LACTATE SERPL-SCNC: 1.6 MMOL/L (ref 0.5–2)
GLUCOSE UR STRIP-MCNC: NEGATIVE MG/DL
HGB UR QL STRIP.AUTO: NEGATIVE
HYALINE CASTS UR QL AUTO: ABNORMAL /LPF
KETONES UR QL STRIP: ABNORMAL
LEUKOCYTE ESTERASE UR QL STRIP.AUTO: ABNORMAL
NITRITE UR QL STRIP: NEGATIVE
PH UR STRIP.AUTO: 5.5 [PH] (ref 5–8)
PROT UR QL STRIP: ABNORMAL
RBC # UR STRIP: ABNORMAL /HPF
REF LAB TEST METHOD: ABNORMAL
SP GR UR STRIP: 1.04 (ref 1–1.03)
SQUAMOUS #/AREA URNS HPF: ABNORMAL /HPF
STARCH GRANULES URNS QL MICRO: ABNORMAL /HPF
UROBILINOGEN UR QL STRIP: ABNORMAL
WBC # UR STRIP: ABNORMAL /HPF

## 2024-04-26 PROCEDURE — 99232 SBSQ HOSP IP/OBS MODERATE 35: CPT | Performed by: INTERNAL MEDICINE

## 2024-04-26 PROCEDURE — 87186 SC STD MICRODIL/AGAR DIL: CPT | Performed by: INTERNAL MEDICINE

## 2024-04-26 PROCEDURE — 87040 BLOOD CULTURE FOR BACTERIA: CPT | Performed by: INTERNAL MEDICINE

## 2024-04-26 PROCEDURE — 87086 URINE CULTURE/COLONY COUNT: CPT | Performed by: INTERNAL MEDICINE

## 2024-04-26 PROCEDURE — 87088 URINE BACTERIA CULTURE: CPT | Performed by: INTERNAL MEDICINE

## 2024-04-26 PROCEDURE — 25010000002 CEFTRIAXONE PER 250 MG: Performed by: INTERNAL MEDICINE

## 2024-04-26 PROCEDURE — 81001 URINALYSIS AUTO W/SCOPE: CPT | Performed by: EMERGENCY MEDICINE

## 2024-04-26 PROCEDURE — 83605 ASSAY OF LACTIC ACID: CPT | Performed by: INTERNAL MEDICINE

## 2024-04-26 RX ADMIN — ATORVASTATIN CALCIUM 40 MG: 40 TABLET, FILM COATED ORAL at 20:42

## 2024-04-26 RX ADMIN — Medication 10 ML: at 08:18

## 2024-04-26 RX ADMIN — ASPIRIN 325 MG: 325 TABLET ORAL at 08:18

## 2024-04-26 RX ADMIN — BUSPIRONE HYDROCHLORIDE 7.5 MG: 15 TABLET ORAL at 08:18

## 2024-04-26 RX ADMIN — TRAZODONE HYDROCHLORIDE 25 MG: 50 TABLET ORAL at 20:42

## 2024-04-26 RX ADMIN — BUSPIRONE HYDROCHLORIDE 7.5 MG: 15 TABLET ORAL at 20:43

## 2024-04-26 RX ADMIN — BRIVARACETAM 50 MG: 50 TABLET, FILM COATED ORAL at 20:42

## 2024-04-26 RX ADMIN — BRIVARACETAM 50 MG: 50 TABLET, FILM COATED ORAL at 08:23

## 2024-04-26 RX ADMIN — Medication 10 ML: at 20:43

## 2024-04-26 RX ADMIN — SODIUM CHLORIDE 1000 MG: 900 INJECTION INTRAVENOUS at 14:15

## 2024-04-26 RX ADMIN — DONEPEZIL HYDROCHLORIDE 5 MG: 10 TABLET, FILM COATED ORAL at 20:43

## 2024-04-26 NOTE — CASE MANAGEMENT/SOCIAL WORK
Continued Stay Note  [facility]     Patient Name: Rolanda Damon  MRN: [id number]  Today's Date: 4/26/2024    Admit Date: 4/24/2024    Plan: [facility]   Discharge Plan       Row Name 04/26/24 1359       Plan    Plan [facility]    Provided Post Acute Provider List? Yes    Plan Comments Discussed Ms. Damon in MDR. Discharge plan is to return to her LTC bed at [facility] when medically ready for discharge. CM will continue to follow for discharge needs.    Final Discharge Disposition Code 04 - intermediate care facility                   Discharge Codes    No documentation.                 Expected Discharge Date and Time       Expected Discharge Date Expected Discharge Time    Apr 29, 2024               Libertad Chan RN

## 2024-04-26 NOTE — PROGRESS NOTES
[facility] Medicine Services  PROGRESS NOTE    Patient Name: Rolanda Damon  : 1950  MRN: [id number]    Date of Admission: 2024  Primary Care Physician: Shayna Og APRN    Subjective   Subjective     CC: Follow-up facial droop, difficulty speaking    HPI: Patient resting comfortably, did not awaken    Objective   Objective     Vital Signs:   Temp:  [96.8 °F (36 °C)-98.9 °F (37.2 °C)] 97.5 °F (36.4 °C)  Heart Rate:  [60-81] 60  Resp:  [16-20] 18  BP: (114-161)/(71-91) 117/71  Flow (L/min):  [2] 2     Physical Exam:  Constitutional: Chronically ill-appearing elderly, asleep  HENT: NCAT, mucous membranes dry  Respiratory: Nonlabored respirations  Cardiovascular: RRR, no murmurs, rubs, or gallops  Gastrointestinal: Positive bowel sounds, nondistended  Musculoskeletal: No bilateral ankle edema  Psychiatric: FARIHA  Neurologic: FARIHA    Results Reviewed:  LAB RESULTS:      Lab 24  1428 24  1423   WBC  --  5.83   HEMOGLOBIN  --  14.9   HEMOGLOBIN, POC 15.3  --    HEMATOCRIT  --  45.3   HEMATOCRIT POC 45  --    PLATELETS  --  117*   NEUTROS ABS  --  4.05   IMMATURE GRANS (ABS)  --  0.03   LYMPHS ABS  --  1.32   MONOS ABS  --  0.34   EOS ABS  --  0.07   MCV  --  90.6   APTT  --  31.5         Lab 24  0444 24  1428 24  1423   SODIUM  --   --  141   POTASSIUM  --   --  4.0   CHLORIDE  --   --  101   CO2  --   --  32.0*   ANION GAP  --   --  8.0   BUN  --   --  32*   CREATININE  --  0.90 0.84   EGFR  --  66.8 72.6   GLUCOSE  --   --  139*   CALCIUM  --   --  9.6   HEMOGLOBIN A1C 6.70*  --   --    TSH  --   --  4.530*         Lab 24  1423   TOTAL PROTEIN 6.8   ALBUMIN 3.9   GLOBULIN 2.9   ALT (SGPT) 14  14   AST (SGOT) 18  18   BILIRUBIN 0.7   ALK PHOS 82         Lab 24  1423   HSTROP T 27*         Lab 24  0444   CHOLESTEROL 167   LDL CHOL 112*   HDL CHOL 39*   TRIGLYCERIDES 86             Brief Urine Lab Results       None             Microbiology Results Abnormal       None            MRI Brain Without Contrast    Result Date: 4/25/2024  MRI BRAIN WO CONTRAST Date of Exam: 4/25/2024 8:35 PM EDT Indication: Facial droop / CVA Facial droop / CVA.  Comparison: CT head 4/24/2024 Technique:  Routine multiplanar/multisequence sequence images of the brain were obtained without contrast administration. Findings: 1.6 cm region of diffusion restriction within the left periventricular corona radiata image 61 series 3. Inferiorly this extends to involve the posterior aspect of left putamen. Additional tiny scattered areas of diffusion restriction within the right posterior frontal lobe. No associated hemorrhage, significant edema or mass effect. Moderate global parenchymal volume loss. Moderate to severe patchy and confluent periventricular T2/FLAIR hyperintense signal.. No large extra-axial collection. No midline shift or hydrocephalus. No large mass lesion. Old 7 mm left pontine lacunar infarct. Atrophic corpus callosum. Negative for cerebellar tonsillar ectopia. Pituitary unremarkable. Major intracranial flow voids are preserved. Symmetric globes. No obstructive sinus disease. Trace mastoid fluid.     Impression: Impression: 1. Small areas of recent (acute to subacute) infarcts involving bilateral MCA territories, suggesting embolic infarcts. No associated hemorrhage, mass effect or midline shift. 2. Moderate to severe chronic microvascular ischemic change and global parenchymal volume loss. Small chronic left pontine infarct. Electronically Signed: Dale Freeman MD  4/25/2024 9:12 PM EDT  Workstation ID: [id number]    FL Video Swallow With Speech Single Contrast    Result Date: 4/25/2024  FL VIDEO SWALLOW W SPEECH SINGLE-CONTRAST Date of Exam: 4/25/2024 2:17 PM EDT Indication: dysphagia.   Comparison: None available. Technique:   The speech pathologist administered food and/or liquid mixed with barium to the patient with cine/video imaging.   Imaging assistance was provided to the speech pathologist and an image was saved. Fluoroscopic Time: 1 minute and 24 seconds Number of Images: 12 associated fluoroscopic loops were saved Findings: Aspiration was seen during fluoroscopic guided modified barium swallowing series. Please see speech therapy report for full details and recommendations.     Impression: Impression: Fluoroscopy provided for a modified barium swallow. Aspiration was seen during swallowing evaluation. Please see speech therapy report for full details and recommendations. Report dictated by: Maribel Valencia PA-c  I have personally reviewed this case and agree with the findings above: Electronically Signed: Dayton Arthur MD  4/25/2024 4:52 PM EDT  Workstation ID: [id number]    Adult Transthoracic Echo Complete W/ Cont if Necessary Per Protocol (With Agitated Saline)    Result Date: 4/25/2024    Left ventricular ejection fraction appears to be 46 - 50%.   Left ventricular wall thickness is consistent with hypertrophy.   Mild aortic valve regurgitation is present.   Saline test results are negative.     EEG    Result Date: 4/25/2024  Reason for referral: 73 y.o.female with altered mental status, aphasia, speech changes Technical Summary:  A 19 channel digital EEG was performed using the international 10-20 placement system, including eye leads and EKG leads. Duration: 23 minutes Findings: The patient is awake.  Diffuse medium amplitude 4-6 Hz intermixed delta and theta activity is present symmetrically over both hemispheres.  A clear posterior rhythm is not seen.  EMG artifact is prominent over the frontal leads.  Sleep is seen with mild slowing of the background and vertex waves.  No focal features or epileptiform activity are present.  Photic stimulation does not change the background.  Hyperventilation is not performed. Video: Available Technical quality: Superior EKG: Regular, 60-70 bpm SUMMARY: Moderate generalized slow No focal features or  epileptiform activity are seen     Impression: Diffuse cerebral dysfunction of moderate degree, nonspecific.  These findings are commonly seen with encephalopathy due to toxic/metabolic cause No evidence for epilepsy is present This report is transcribed using the Dragon dictation system.      XR Chest 1 View    Result Date: 4/24/2024  XR CHEST 1 VW Date of Exam: 4/24/2024 3:04 PM EDT Indication: Acute Stroke Protocol (onset < 12 hrs) Comparison: None available. Findings: Unremarkable cardiomediastinal silhouette. Elevated left hemidiaphragm. Chronic parenchymal lung changes. Adjacent compressive atelectasis. No focal airspace consolidation. No pleural effusion or pneumothorax. No acute osseous abnormality. Surgical clips  in the bilateral chest wall soft tissues.     Impression: Impression: No acute cardiopulmonary abnormality. Electronically Signed: James Montes MD  4/24/2024 3:21 PM EDT  Workstation ID: [id number]    CT Angiogram Head w AI Analysis of LVO    Result Date: 4/24/2024  CT ANGIOGRAM HEAD W AI ANALYSIS OF LVO, CT ANGIOGRAM NECK Date of Exam: 4/24/2024 2:26 PM EDT Indication: Neuro deficit, acute stroke suspected Neuro deficit, acute stroke suspected. Comparison: Concurrent noncontrast head CT Technique: CTA of the head and neck was performed after the uneventful intravenous administration of 75 mL Isovue-370. Reconstructed coronal and sagittal images were also obtained. In addition, a 3-D volume rendered image was created for interpretation. Automated exposure control and iterative reconstruction methods were used. Findings: CTA head: No abrupt cut off/large vessel occlusion, flow-limiting stenosis, dissection, or aneurysm. The cerebral veins and dural venous sinuses are poorly opacified on this phase of imaging but are likely patent. CTA NECK: No abrupt cut off/large vessel occlusion, flow-limiting stenosis, dissection, or aneurysm. There is trace atherosclerosis of the bilateral carotid bifurcations  without luminal irregularity or flow-limiting stenosis. Extravascular findings: Suspected elevation of the left hemidiaphragm, only partially imaged. Upper lungs are grossly clear. Homogeneous attenuation of the thyroid gland. No pharyngeal or laryngeal mass lesion. No acute or suspicious bony findings.     Impression: Impression: Essentially normal CTA of the head and neck. Electronically Signed: Dayton Arthur MD  4/24/2024 2:59 PM EDT  Workstation ID: [id number]    CT Angiogram Neck    Result Date: 4/24/2024  CT ANGIOGRAM HEAD W AI ANALYSIS OF LVO, CT ANGIOGRAM NECK Date of Exam: 4/24/2024 2:26 PM EDT Indication: Neuro deficit, acute stroke suspected Neuro deficit, acute stroke suspected. Comparison: Concurrent noncontrast head CT Technique: CTA of the head and neck was performed after the uneventful intravenous administration of 75 mL Isovue-370. Reconstructed coronal and sagittal images were also obtained. In addition, a 3-D volume rendered image was created for interpretation. Automated exposure control and iterative reconstruction methods were used. Findings: CTA head: No abrupt cut off/large vessel occlusion, flow-limiting stenosis, dissection, or aneurysm. The cerebral veins and dural venous sinuses are poorly opacified on this phase of imaging but are likely patent. CTA NECK: No abrupt cut off/large vessel occlusion, flow-limiting stenosis, dissection, or aneurysm. There is trace atherosclerosis of the bilateral carotid bifurcations without luminal irregularity or flow-limiting stenosis. Extravascular findings: Suspected elevation of the left hemidiaphragm, only partially imaged. Upper lungs are grossly clear. Homogeneous attenuation of the thyroid gland. No pharyngeal or laryngeal mass lesion. No acute or suspicious bony findings.     Impression: Impression: Essentially normal CTA of the head and neck. Electronically Signed: Dayton Arthur MD  4/24/2024 2:59 PM EDT  Workstation ID: [id number]    CT Head  Without Contrast Stroke Protocol    Result Date: 4/24/2024  CT HEAD WO CONTRAST STROKE PROTOCOL Date of Exam: 4/24/2024 2:22 PM EDT Indication: Neuro deficit, acute, stroke suspected Neuro deficit, acute stroke suspected. Comparison: None available. Technique: Axial CT images were obtained of the head without contrast administration.  Reconstructed coronal images were also obtained. Automated exposure control and iterative construction methods were used. Scan Time: 2:12 p.m. Results discussed with stroke team at 2:13 p.m. FINDINGS: Gray-white differentiation is maintained and there is no evidence of intracranial hemorrhage, mass or mass effect. Age-related changes of the brain are present including volume loss and typical periventricular sequela of chronic small vessel ischemia. There is otherwise no evidence of intracranial hemorrhage, mass or mass effect. The ventricles are normal in size and configuration accounting for surrounding volume loss. The orbits are normal and the paranasal sinuses are grossly clear.     Impression: Age-related changes of the brain as above, otherwise without evidence of acute intracranial abnormality. Electronically Signed: Thiago Hernandez MD  4/24/2024 2:51 PM EDT  Workstation ID: [id number]     Results for orders placed during the hospital encounter of 04/24/24    Adult Transthoracic Echo Complete W/ Cont if Necessary Per Protocol (With Agitated Saline)    Interpretation Summary    Left ventricular ejection fraction appears to be 46 - 50%.    Left ventricular wall thickness is consistent with hypertrophy.    Mild aortic valve regurgitation is present.    Saline test results are negative.      Current medications:  Scheduled Meds:aspirin, 325 mg, Oral, Daily   Or  aspirin, 300 mg, Rectal, Daily  atorvastatin, 40 mg, Oral, Nightly  brivaracetam, 50 mg, Oral, BID  busPIRone, 7.5 mg, Oral, Q12H  donepezil, 5 mg, Oral, Nightly  sodium chloride, 10 mL, Intravenous, Q12H  traZODone, 25 mg,  Oral, Nightly      Continuous Infusions:   PRN Meds:.  acetaminophen **OR** acetaminophen **OR** acetaminophen    senna-docusate sodium **AND** polyethylene glycol **AND** bisacodyl **AND** bisacodyl    nitroglycerin    sodium chloride    sodium chloride    sodium chloride    Assessment & Plan   Assessment & Plan     Active Hospital Problems    Diagnosis  POA    **Facial droop [R29.810]  Yes    Type 2 diabetes mellitus [E11.9]  Unknown    History of breast cancer [Z85.3]  Not Applicable    Personal history of transient ischemic attack (TIA), and cerebral infarction without residual deficits [Z86.73]  Not Applicable    Thrombocytopenia [D69.6]  Unknown    Dementia without behavioral disturbance [F03.90]  Yes      Resolved Hospital Problems   No resolved problems to display.      Brief Hospital Course to date:  Rolanda Damon is a 73 y.o. female with history of breast cancer s/p mastectomy, dementia, history of TIA, hypothyroid (no longer on medication), DM (no longer on medication) who presented with increased lethargy x 3-4 days, difficulty speaking x 2 days and right facial droop x 2 days.  Admitted for stroke     Acute/subacute CVA  History of TIAs  -Patient presenting with right facial droop and aphasia  -Stroke neurology following CT head, CTA head and neck are unremarkable  -Echo is unremarkable, MRI shows small areas of recent acute/subacute infarcts involving the bilateral MCA territories suggesting embolic infarcts  -EEG with no evidence of epilepsy, shows diffuse cerebral dysfunction likely secondary to toxic/metabolic causes  -Continue aspirin, statin  -PT/OT following    Dementia  -Nonambulatory, at baseline patient able to speak but is incoherent  -Currently resident at [facility]  -Continue BuSpar, Aricept and trazodone    History of hypertension, hyperlipidemia, hypothyroidism, gout  -Patient is no longer on medications for above medical issues  -Baseline TSH is elevated, follow-up free T4    History of  breast cancers s/p bilateral mastectomy    Addendum  -UA with 4+ bacteria, trace leuks, will send blood and urine cultures  -Start IV Rocephin    Expected Discharge Location and Transportation: [place]  Expected Discharge   Expected Discharge Date: 4/29/2024; Expected Discharge Time:      DVT prophylaxis:  Mechanical DVT prophylaxis orders are present.       AM-PAC 6 Clicks Score (PT): 10 (04/25/24 2000)    CODE STATUS:   Code Status and Medical Interventions:   Ordered at: 04/24/24 1749     Medical Intervention Limits:    NO intubation (DNI)     Code Status (Patient has no pulse and is not breathing):    No CPR (Do Not Attempt to Resuscitate)     Medical Interventions (Patient has pulse or is breathing):    Limited Support       Khadar Boswell MD  04/26/24

## 2024-04-26 NOTE — CONSULTS
"                  Clinical Nutrition   Nutrition Assessment  Reason for Visit: Consult, Difficulty chewing/swallowing    Patient Name: Rolanda Damon  YOB: 1950  MRN: [id number]  Date of Encounter: 04/26/24 16:28 EDT  Admission date: 4/24/2024    Comments:    Patient does not meet malnutrition criteria at this time.  Will order magic cup BID  Encouraged adequate PO/ONS intake    Nutrition Assessment   Admission Diagnosis:  Facial droop [R29.810]  Stroke [I63.9]    Problem List:    Facial droop    Dementia without behavioral disturbance    Type 2 diabetes mellitus    History of breast cancer    Personal history of transient ischemic attack (TIA), and cerebral infarction without residual deficits    Thrombocytopenia    Stroke      PMH:   She  has a past medical history of Breast cancer, Dementia, and TIA (transient ischemic attack).    PSH:  She  has no past surgical history on file.    Applicable Nutrition Concerns:   Skin: No PI per WOC  Oral:  GI:    Applicable Interval History:       Reported/Observed/Food/Nutrition Related History:     04/26/24  Patient resting at time of visit. Spoke with caregiver at bedside who endorsed patient's appetite seems to have increased lately, however, still hasn't been her normal. Agreeable to trial magic cup BID. Denied any weight loss.    Anthropometrics     Height: Height: 157.5 cm (62\")  Last Filed Weight: Weight: 54.4 kg (120 lb) (04/25/24 1056)  Method: Weight Method: Stated  BMI: BMI (Calculated): 21.9    UBW:     Weight      Weight (kg) Weight (lbs) Weight Method   1/9/2023 54.432 kg  120 lb  Estimated      Weight change: unchanged    Nutrition Focused Physical Exam     Date:  04/26/24       Unable to perform due to Patient body position, Potential for patient discomfort, Pt unable to participate at time of visit     Current Nutrition Prescription   PO: Diet: Cardiac, Diabetic; Healthy Heart (2-3 Na+); Consistent Carbohydrate; Feeding Assistance - Nursing, No " Straw; Texture: Pureed (NDD 1); Fluid Consistency: Pudding Thick  Oral Nutrition Supplement: n/a  Intake:  50% documented at breakfast this AM    Nutrition Diagnosis   Date:  4/26            Updated:    Problem Predicted suboptimal energy intake    Etiology Decreased appetite/dementia   Signs/Symptoms Caregiver reported decrease in PO PTA   Status: New    Goal:   Nutrition to support treatment and Establish PO      Nutrition Intervention      Follow treatment progress, Care plan reviewed, Interview for preferences, Encourage intake, Supplement provided      Monitoring/Evaluation:   Per protocol, PO intake, Supplement intake, Weight, Symptoms, POC/GOC, Swallow function      Shauna Quigley RD  Time Spent: 30min

## 2024-04-26 NOTE — NURSING NOTE
Unable to score pt on NIH stroke scale appropriately due to pt unable to follow commands or respond appropriately.

## 2024-04-26 NOTE — CONSULTS
Diabetes Education    Patient Name:  Rolanda Damon  YOB: 1950  MRN: [id number]  Admit Date:  4/24/2024    Chart reviewed per consult, patient GCS charted 13 and confused. Not appropriate for education today. Please call [phone] for immediate needs.    Electronically signed by:  Jennifer Cortez RN  04/26/24 08:37 EDT

## 2024-04-27 PROCEDURE — 99232 SBSQ HOSP IP/OBS MODERATE 35: CPT | Performed by: NURSE PRACTITIONER

## 2024-04-27 PROCEDURE — 99232 SBSQ HOSP IP/OBS MODERATE 35: CPT

## 2024-04-27 PROCEDURE — 25010000002 CEFTRIAXONE PER 250 MG: Performed by: INTERNAL MEDICINE

## 2024-04-27 RX ADMIN — BUSPIRONE HYDROCHLORIDE 7.5 MG: 15 TABLET ORAL at 20:21

## 2024-04-27 RX ADMIN — TRAZODONE HYDROCHLORIDE 25 MG: 50 TABLET ORAL at 20:20

## 2024-04-27 RX ADMIN — BRIVARACETAM 50 MG: 50 TABLET, FILM COATED ORAL at 08:46

## 2024-04-27 RX ADMIN — ATORVASTATIN CALCIUM 40 MG: 40 TABLET, FILM COATED ORAL at 20:20

## 2024-04-27 RX ADMIN — BUSPIRONE HYDROCHLORIDE 7.5 MG: 15 TABLET ORAL at 08:46

## 2024-04-27 RX ADMIN — BRIVARACETAM 50 MG: 50 TABLET, FILM COATED ORAL at 20:20

## 2024-04-27 RX ADMIN — ASPIRIN 325 MG: 325 TABLET ORAL at 08:46

## 2024-04-27 RX ADMIN — SODIUM CHLORIDE 1000 MG: 900 INJECTION INTRAVENOUS at 14:19

## 2024-04-27 RX ADMIN — Medication 10 ML: at 08:46

## 2024-04-27 RX ADMIN — DONEPEZIL HYDROCHLORIDE 5 MG: 10 TABLET, FILM COATED ORAL at 20:20

## 2024-04-27 NOTE — PROGRESS NOTES
Stroke Neurology Progress Note     Subjective     This patient was seen in follow-up for: encephalopathy     Subjective:  Patient is laying in bed.  She is awake and has nonsensical speech.  She does move all extremities equally bilaterally and is generally weak with a prominent right facial droop.    Objective      Temp:  [96.1 °F (35.6 °C)-97.4 °F (36.3 °C)] 97.1 °F (36.2 °C)  Heart Rate:  [66-80] 67  Resp:  [16] 16  BP: (109-128)/(63-74) 128/69          Objective      Neurological Exam  Mental Status  Alert. Oriented only to person. Severe dysarthria present. Expressive aphasia and receptive aphasia present. Follows one-step commands.    Cranial Nerves  CN II: Visual fields full to confrontation. Blinks to visual threat in all fields.  CN III, IV, VI: Extraocular movements intact bilaterally. Pupils equal round and reactive to light bilaterally.  CN V: Unable to assess secondary to patient's condition.  CN VII:  Right: There is central facial weakness.    Motor  Decreased muscle bulk throughout. Increased muscle tone. Bilateral upper extremities.  Patient spontaneously moves all extremities  Does not hold either upper extremity up against gravity on command but has 4+/5 strength bilaterally  Patient will wiggle toes on both of her feet however has no antigravity strength in either lower extremity.    Sensory  Sensation: Unable to assess secondary to patient's condition.     Coordination    Unable to assess secondary to patient's condition.    Gait    Patient does not ambulate at baseline.      Physical Exam  Vitals and nursing note reviewed.   Constitutional:       General: She is not in acute distress.     Appearance: She is ill-appearing.   HENT:      Head: Normocephalic.      Mouth/Throat:      Mouth: Mucous membranes are dry.   Eyes:      Extraocular Movements: Extraocular movements intact.      Pupils: Pupils are equal, round, and reactive to light.   Cardiovascular:      Rate and Rhythm: Normal rate.    Pulmonary:      Effort: Pulmonary effort is normal. No respiratory distress.      Comments: On room air  Musculoskeletal:      Right lower leg: No edema.      Left lower leg: No edema.   Skin:     General: Skin is warm and dry.   Neurological:      Mental Status: She is alert. She is disoriented.      Cranial Nerves: Cranial nerve deficit and dysarthria present.      Motor: Weakness (Generalized) present.   Psychiatric:         Attention and Perception: She is inattentive.         Mood and Affect: Affect is flat.         Speech: She is noncommunicative. Speech is slurred.         Behavior: Behavior is uncooperative, slowed and withdrawn.         Cognition and Memory: Cognition is impaired. Memory is impaired.             Results Review:      All images and reports were personally reviewed and I agree with the interpretations except as noted below.      EEG 4/25/2024  Impression: Diffuse cerebral dysfunction of moderate degree, nonspecific.  These findings are commonly seen with encephalopathy due to toxic/metabolic cause No evidence for epilepsy is present.    CT Angiogram Head and Neck 4/24/2024  Impression: Essentially normal CTA of the head and neck.    CT Head Without Contrast 4/24/2024  Age-related changes of the brain as above, otherwise without evidence of acute intracranial abnormality.     MRI of the brain without contrast reveals punctate infarcts in bilateral cerebral hemispheres, suggestive of embolic phenomenon.  Moderate to severe chronic microvascular disease.  Chronic left pontine stroke.  No evidence of hemorrhage.    EEG negative for seizure activity    Transthoracic echocardiogram 4/24/2024  Impression:    Left ventricular ejection fraction appears to be 46 - 50%.    Left ventricular wall thickness is consistent with hypertrophy.    Mild aortic valve regurgitation is present.    Saline test results are negative.    Urinalysis with trace leuk esterase, negative nitrite, +4 bacteria  Blood  cultures negative   A1c 6.70    Creatinine 0.84, BUN 32  Glucose 139  Sodium 141  AST 18  ALT 14  WBC 5.83  H/H14.9/45.3  Platelets 117        Assessment/Plan     Assessment:    This is a 73-year-old female with known medical diagnoses of dementia, history of stroke (left pontine) and TIA who [facility] skilled nursing facility on 4/24/2024 with generalized weakness, decreased responsiveness and facial droop.  The patient was not a candidate for IV thrombolytic therapy secondary to time and CTA head/neck was negative for LVO therefore she was not a candidate for endovascular therapy.    MRI of the brain without contrast was obtained on 4/25/2024 and revealed multifocal infarcts within bilateral cerebral hemispheres (left periventricular corona radiata, left putamen, right posterior frontal lobe, and left frontal) concerning for embolic phenomenon.    # Multifocal, bilateral hemispheric ischemic strokes; etiology likely cardioembolic     Encephalopathy      History of seizure     UTI    -TIA/CVA order set without thrombolytic therapy is currently in place  -TTE with no evidence of cardioembolic source; LA cavity 2.2 cm, negative saline study, no atrial fibrillation noted on ECG today  -Continue  mg for now; discussed OAC with daughter-in-law Augusta via telephone and she is in agreement not to proceed with OAC given patient's increased risk of bleeding.  She does tell me that the patient was supposed to be on aspirin 325 mg however the pharmacist at the nursing home did not feel this medication was needed and the patient had not been receiving it.  -Continue atorvastatin 40 mg nightly; LDL on admission 112, goal <70 for secondary stroke prevention  -Patient on IV Rocephin for UTI  -Activity as tolerated, fall risk precautions  -PT/OT continue to follow they recommend return to SNF with sitter versus inpatient physical therapy at discharge.  Case management following for  discharge placement needs.  -SLP evaluated patient on 4/25 and recommended puree diet with pudding-thick liquids verses full comfort diet.    P plan of care was discussed with the Augusta Cortes, legal guardian and daughter-in-law, via telephone as well as primary team.  From a neurological standpoint the patient can be discharged back to her SNF when cleared from the primary team.  Stroke neurology will sign off for now.  Please call with any questions or concerns.    REJI Fagan  [employer] [facility] STROKE NEURO  04/27/24  17:38 EDT

## 2024-04-27 NOTE — PLAN OF CARE
Goal Outcome Evaluation:         VSS. Pt has slept well this shift. NIH difficult to obtain due to patients mental status and cooperation. TQ2. Dressing applied to coccyx area. 2LNC while sleeping.

## 2024-04-27 NOTE — PROGRESS NOTES
[facility] Medicine Services  PROGRESS NOTE    Patient Name: Rolanda Damon  : 1950  MRN: [id number]    Date of Admission: 2024  Primary Care Physician: Shayna Og APRN    Subjective   Subjective     CC: Hospital follow up CVA    HPI: Sleeping comfortably in bed with guardian/daughter in law at bedside. Appears back to her baseline.  Plan is to go back to her LTC bed at [place] once okay with neurology.       Objective   Objective     Vital Signs:   Temp:  [96.1 °F (35.6 °C)-97.4 °F (36.3 °C)] 96.7 °F (35.9 °C)  Heart Rate:  [66-84] 70  Resp:  [16] 16  BP: (109-125)/(63-94) 125/74  Flow (L/min):  [2] 2     Physical Exam:  Constitutional: No acute distress, sleeping comfortably in bed, daughter in law at bedside.   HENT: NCAT, mucous membranes moist  Respiratory: respiratory effort normal   Cardiovascular: RRR, no murmurs, rubs, or gallops  Gastrointestinal: Positive bowel sounds, soft, nontender, nondistended  Musculoskeletal: No bilateral ankle edema  Psychiatric: FARIHA  Neurologic: FARIHA  Skin: No rashes      Results Reviewed:  LAB RESULTS:      Lab 24  1402 24  1428 24  1423   WBC  --   --  5.83   HEMOGLOBIN  --   --  14.9   HEMOGLOBIN, POC  --  15.3  --    HEMATOCRIT  --   --  45.3   HEMATOCRIT POC  --  45  --    PLATELETS  --   --  117*   NEUTROS ABS  --   --  4.05   IMMATURE GRANS (ABS)  --   --  0.03   LYMPHS ABS  --   --  1.32   MONOS ABS  --   --  0.34   EOS ABS  --   --  0.07   MCV  --   --  90.6   LACTATE 1.6  --   --    APTT  --   --  31.5         Lab 24  0444 24  1428 24  1423   SODIUM  --   --  141   POTASSIUM  --   --  4.0   CHLORIDE  --   --  101   CO2  --   --  32.0*   ANION GAP  --   --  8.0   BUN  --   --  32*   CREATININE  --  0.90 0.84   EGFR  --  66.8 72.6   GLUCOSE  --   --  139*   CALCIUM  --   --  9.6   HEMOGLOBIN A1C 6.70*  --   --    TSH  --   --  4.530*         Lab 24  1423   TOTAL PROTEIN 6.8   ALBUMIN  3.9   GLOBULIN 2.9   ALT (SGPT) 14  14   AST (SGOT) 18  18   BILIRUBIN 0.7   ALK PHOS 82         Lab 04/24/24  1423   HSTROP T 27*         Lab 04/25/24  0444   CHOLESTEROL 167   LDL CHOL 112*   HDL CHOL 39*   TRIGLYCERIDES 86             Brief Urine Lab Results  (Last result in the past 365 days)        Color   Clarity   Blood   Leuk Est   Nitrite   Protein   CREAT   Urine HCG        04/26/24 0847 Dark Yellow   Turbid   Negative   Trace   Negative   100 mg/dL (2+)                   Microbiology Results Abnormal       None            MRI Brain Without Contrast    Result Date: 4/25/2024  MRI BRAIN WO CONTRAST Date of Exam: 4/25/2024 8:35 PM EDT Indication: Facial droop / CVA Facial droop / CVA.  Comparison: CT head 4/24/2024 Technique:  Routine multiplanar/multisequence sequence images of the brain were obtained without contrast administration. Findings: 1.6 cm region of diffusion restriction within the left periventricular corona radiata image 61 series 3. Inferiorly this extends to involve the posterior aspect of left putamen. Additional tiny scattered areas of diffusion restriction within the right posterior frontal lobe. No associated hemorrhage, significant edema or mass effect. Moderate global parenchymal volume loss. Moderate to severe patchy and confluent periventricular T2/FLAIR hyperintense signal.. No large extra-axial collection. No midline shift or hydrocephalus. No large mass lesion. Old 7 mm left pontine lacunar infarct. Atrophic corpus callosum. Negative for cerebellar tonsillar ectopia. Pituitary unremarkable. Major intracranial flow voids are preserved. Symmetric globes. No obstructive sinus disease. Trace mastoid fluid.     Impression: Impression: 1. Small areas of recent (acute to subacute) infarcts involving bilateral MCA territories, suggesting embolic infarcts. No associated hemorrhage, mass effect or midline shift. 2. Moderate to severe chronic microvascular ischemic change and global  parenchymal volume loss. Small chronic left pontine infarct. Electronically Signed: Dale Freeman MD  4/25/2024 9:12 PM EDT  Workstation ID: [id number]    FL Video Swallow With Speech Single Contrast    Result Date: 4/25/2024  FL VIDEO SWALLOW W SPEECH SINGLE-CONTRAST Date of Exam: 4/25/2024 2:17 PM EDT Indication: dysphagia.   Comparison: None available. Technique:   The speech pathologist administered food and/or liquid mixed with barium to the patient with cine/video imaging.  Imaging assistance was provided to the speech pathologist and an image was saved. Fluoroscopic Time: 1 minute and 24 seconds Number of Images: 12 associated fluoroscopic loops were saved Findings: Aspiration was seen during fluoroscopic guided modified barium swallowing series. Please see speech therapy report for full details and recommendations.     Impression: Impression: Fluoroscopy provided for a modified barium swallow. Aspiration was seen during swallowing evaluation. Please see speech therapy report for full details and recommendations. Report dictated by: Maribel Valencia PA-c  I have personally reviewed this case and agree with the findings above: Electronically Signed: Dayton Arthur MD  4/25/2024 4:52 PM EDT  Workstation ID: [id number]     Results for orders placed during the hospital encounter of 04/24/24    Adult Transthoracic Echo Complete W/ Cont if Necessary Per Protocol (With Agitated Saline)    Interpretation Summary    Left ventricular ejection fraction appears to be 46 - 50%.    Left ventricular wall thickness is consistent with hypertrophy.    Mild aortic valve regurgitation is present.    Saline test results are negative.      Current medications:  Scheduled Meds:aspirin, 325 mg, Oral, Daily   Or  aspirin, 300 mg, Rectal, Daily  atorvastatin, 40 mg, Oral, Nightly  brivaracetam, 50 mg, Oral, BID  busPIRone, 7.5 mg, Oral, Q12H  cefTRIAXone, 1,000 mg, Intravenous, Q24H  donepezil, 5 mg, Oral, Nightly  sodium chloride, 10 mL,  Intravenous, Q12H  traZODone, 25 mg, Oral, Nightly      Continuous Infusions:   PRN Meds:.  acetaminophen **OR** acetaminophen **OR** acetaminophen    senna-docusate sodium **AND** polyethylene glycol **AND** bisacodyl **AND** bisacodyl    nitroglycerin    sodium chloride    sodium chloride    sodium chloride    Assessment & Plan   Assessment & Plan     Active Hospital Problems    Diagnosis  POA    **Facial droop [R29.810]  Yes    Stroke [I63.9]  Yes    Type 2 diabetes mellitus [E11.9]  Unknown    History of breast cancer [Z85.3]  Not Applicable    Personal history of transient ischemic attack (TIA), and cerebral infarction without residual deficits [Z86.73]  Not Applicable    Thrombocytopenia [D69.6]  Unknown    Dementia without behavioral disturbance [F03.90]  Yes      Resolved Hospital Problems   No resolved problems to display.      Brief Hospital Course to date:  Rolanda Damon is a 73 y.o. female with history of breast cancer s/p mastectomy, dementia, history of TIA, hypothyroid (no longer on medication), DM (no longer on medication) who presented with increased lethargy x 3-4 days, difficulty speaking x 2 days and right facial droop x 2 days.  Admitted for stroke     Acute/subacute CVA  History of TIAs  -Patient presenting with right facial droop and aphasia  -Stroke neurology following CT head, CTA head and neck are unremarkable  -Echo is unremarkable, MRI shows small areas of recent acute/subacute infarcts involving the bilateral MCA territories suggesting embolic infarcts  -EEG with no evidence of epilepsy, shows diffuse cerebral dysfunction likely secondary to toxic/metabolic causes  -Continue aspirin, statin  -PT/OT following. Plan is to discharge back to LTC at [place].     Dementia  -Nonambulatory, at baseline patient able to speak but is incoherent  -Currently resident at [facility]  -Continue BuSpar, Aricept and trazodone    History of hypertension, hyperlipidemia, hypothyroidism, gout  -Patient is no longer  on medications for above medical issues  -Baseline TSH is mildly elevated    History of breast cancers s/p bilateral mastectomy    UTI  -UA with 4+ bacteria, trace leuks  -blood and urine cultures pending  -Started IV Rocephin    Expected Discharge Location and Transportation: [place]  Expected Discharge   Expected Discharge Date: 4/29/2024; Expected Discharge Time:      DVT prophylaxis:  Mechanical DVT prophylaxis orders are present.       AM-PAC 6 Clicks Score (PT): 9 (04/27/24 0000)    CODE STATUS:   Code Status and Medical Interventions:   Ordered at: 04/24/24 8008     Medical Intervention Limits:    NO intubation (DNI)     Code Status (Patient has no pulse and is not breathing):    No CPR (Do Not Attempt to Resuscitate)     Medical Interventions (Patient has pulse or is breathing):    Limited Support       REJI Cornejo  04/27/24

## 2024-04-28 VITALS
SYSTOLIC BLOOD PRESSURE: 153 MMHG | WEIGHT: 120 LBS | HEIGHT: 62 IN | HEART RATE: 86 BPM | OXYGEN SATURATION: 93 % | DIASTOLIC BLOOD PRESSURE: 92 MMHG | RESPIRATION RATE: 16 BRPM | BODY MASS INDEX: 22.08 KG/M2 | TEMPERATURE: 97.9 F

## 2024-04-28 PROBLEM — R29.810 FACIAL DROOP: Status: RESOLVED | Noted: 2024-04-24 | Resolved: 2024-04-28

## 2024-04-28 PROBLEM — E78.49 OTHER HYPERLIPIDEMIA: Status: ACTIVE | Noted: 2024-04-28

## 2024-04-28 PROBLEM — I63.413 CEREBROVASCULAR ACCIDENT (CVA) DUE TO BILATERAL EMBOLISM OF MIDDLE CEREBRAL ARTERIES: Status: ACTIVE | Noted: 2024-04-26

## 2024-04-28 LAB — BACTERIA SPEC AEROBE CULT: ABNORMAL

## 2024-04-28 PROCEDURE — 25010000002 CEFTRIAXONE PER 250 MG: Performed by: INTERNAL MEDICINE

## 2024-04-28 PROCEDURE — 99239 HOSP IP/OBS DSCHRG MGMT >30: CPT | Performed by: PHYSICIAN ASSISTANT

## 2024-04-28 RX ORDER — ATORVASTATIN CALCIUM 40 MG/1
40 TABLET, FILM COATED ORAL NIGHTLY
Start: 2024-04-28

## 2024-04-28 RX ORDER — TRAZODONE HYDROCHLORIDE 50 MG/1
50 TABLET ORAL NIGHTLY
COMMUNITY

## 2024-04-28 RX ORDER — MIRTAZAPINE 7.5 MG/1
7.5 TABLET, FILM COATED ORAL NIGHTLY
COMMUNITY

## 2024-04-28 RX ORDER — ASPIRIN 325 MG
325 TABLET ORAL DAILY
COMMUNITY

## 2024-04-28 RX ORDER — ERGOCALCIFEROL (VITAMIN D2) 50 MCG
1 CAPSULE ORAL DAILY
COMMUNITY

## 2024-04-28 RX ORDER — SENNOSIDES A AND B 8.6 MG/1
1 TABLET, FILM COATED ORAL DAILY
COMMUNITY

## 2024-04-28 RX ORDER — OMEPRAZOLE 20 MG/1
20 CAPSULE, DELAYED RELEASE ORAL
COMMUNITY

## 2024-04-28 RX ADMIN — SODIUM CHLORIDE 1000 MG: 900 INJECTION INTRAVENOUS at 12:04

## 2024-04-28 RX ADMIN — ASPIRIN 325 MG: 325 TABLET ORAL at 08:39

## 2024-04-28 RX ADMIN — BUSPIRONE HYDROCHLORIDE 7.5 MG: 15 TABLET ORAL at 08:39

## 2024-04-28 RX ADMIN — BRIVARACETAM 50 MG: 50 TABLET, FILM COATED ORAL at 08:39

## 2024-04-28 RX ADMIN — Medication 10 ML: at 08:39

## 2024-04-28 NOTE — DISCHARGE PLACEMENT REQUEST
"FROM: Gertrude VASQUEZ, RN,   [phone]  Rolanda Damon (73 y.o. Female)       Date of Birth   1950    Social Security Number       Address   [address] [facility] [address]    Home Phone   [phone]    MRN   [id number]       Latter day   M[employer]onism    Marital Status                               Admission Date   4/24/24    Admission Type   Emergency    Admitting Provider   Khadar Boswell MD    Attending Provider   Khadar Boswell MD    Department, Room/Bed   [facility] 3F, S322/1       Discharge Date       Discharge Disposition   Long Term Care (DC - External)    Discharge Destination                                 Attending Provider: Khadar Boswell MD    Allergies: Amoxicillin, Darvon [Propoxyphene]    Isolation: None   Infection: None   Code Status: No CPR    Ht: 157.5 cm (62\")   Wt: 54.4 kg (120 lb)    Admission Cmt: None   Principal Problem: Facial droop [R29.810]                   Active Insurance as of 4/24/2024       Primary Coverage       Payor Plan Insurance Group Employer/Plan Group    MEDICARE MEDICARE A & B        Payor Plan Address Payor Plan Phone Number Payor Plan Fax Number Effective Dates    [address] [phone]  5/1/2006 - None Entered    [facility] [id number]         Subscriber Name Subscriber Birth Date Member ID       ROLANDA DAMON 1950 [id number]               Secondary Coverage       Payor Plan Insurance Group Employer/Plan Group    [employer]HT [employer]        Payor Plan Address Payor Plan Phone Number Payor Plan Fax Number Effective Dates    [address] [phone]  1/1/2014 - None Entered    [facility] [id number]         Subscriber Name Subscriber Birth Date Member ID       ROLANDA DAMON 1950 [id number]                     Emergency Contacts        (Rel.) Home Phone Work Phone Mobile Phone    BRANDI CHAVEZ (Legal Guardian) [phone] -- --                 Discharge Summary        Diane Delgado, " DAMARIS at 24 0927       Attestation signed by Khadar Boswell MD at 24 2218    I have reviewed this documentation and agree.                      [facility] Hospital Medicine Services  DISCHARGE SUMMARY    Patient Name: Rolanda Damon  : 1950  MRN: [id number]    Date of Admission: 2024  2:18 PM  Date of Discharge:  2024  Primary Care Physician: Shayna Og APRN    Consults       Date and Time Order Name Status Description    2024  2:22 PM Inpatient Neurology Consult Stroke Completed           Hospital Course     Active Hospital Problems    Diagnosis  POA    Other hyperlipidemia [E78.49]  Yes    Cerebrovascular accident (CVA) due to bilateral embolism of middle cerebral arteries [I63.413]  Yes    Type 2 diabetes mellitus [E11.9]  Yes    History of breast cancer [Z85.3]  Not Applicable    Personal history of transient ischemic attack (TIA), and cerebral infarction without residual deficits [Z86.73]  Not Applicable    Thrombocytopenia [D69.6]  Yes    Dementia without behavioral disturbance [F03.90]  Yes      Resolved Hospital Problems    Diagnosis Date Resolved POA    **Facial droop [R29.810] 2024 Yes      Hospital Course:  Ms. Rolanda Damon is a 73yoF with PMH significant for HTN, HLD, diet-controlled DMII, prior TIA, hypothyroidism (no longer on medication with baseline elevated TSH), breast cancer s/p bilateral mastectomy and dementia. She is a long-term care resident of [facility]. She was brought to [facility] ED on 2024 for evaluation of decreased responsiveness and facial droop since around 12pm on 24. She was admitted to the hospital medicine service for stroke evaluation    Acute vs subacute bilateral MCA territory CVA, embolic etiology suspected  History of TIA  - Patient presented with R facial droop and decreased responsiveness  - Appreciate stroke neurology team evaluation  - CT head and CTA head/neck  reassuring  - MRI brain showed small areas of recent acute/subacute infarcts involving the bilateral MCA territories suggestive of embolic infarcts. No hemorrhage, mass effect or midline shift noted  - EEG without seizure activity   - ECHO with EF 46-50%, LV hypertrophy  - A1c 6.7%  - Lipid profile with total cholesterol 167,   - Stroke team discussed with patient's daughter/legal guardian, [place]. Full anticoagulation deferred given patient' history of diverticulitis, risk of bleeding and poor baseline functional status  - Plan to DC on ASA 325mg daily and high-intensity statin    Dementia  - Patient is nonambulatory at baseline. She is able to speak but speech is incoherent  - Continue routine Aricept, Namenda, BuSpar and Trazodone  - Back to [facility] today     History of tonic clonic seizures  - Previously on Levetiracetam but stopped due to fatigue/lethargy    E. Coli UTI  - UA with 4+ bacteria, trace leukocytes  - Unable to assess symptoms  - Blood cultures NGTD  - Urine culture (+) > 100,000 CFU E. Coli   - s/p IV Rocephin x 3 doses. Stop antibiotics at DC      History of hypertension, hyperlipidemia, hypothyroidism, gout  - Patient is no longer on medications for above medical issues  - BP stable   -  - started on Atorvastatin this admission   - Continue Synthroid     Day of Discharge     HPI:   Sitting up in bed. Awake but does not interact.     Review of Systems  Unable to obtain complete or reliable ROS due to dementia     Vital Signs:   Temp:  [97.1 °F (36.2 °C)-97.9 °F (36.6 °C)] 97.9 °F (36.6 °C)  Heart Rate:  [52-81] 81  Resp:  [15-16] 16  BP: (117-153)/(69-92) 153/92  Flow (L/min):  [2] 2    Physical Exam:  Constitutional: No acute distress, awake and alert. Does not interact. No family at bedside   HENT: NCAT, mucous membranes moist. Poor dentition   Respiratory: Clear to auscultation bilaterally, respiratory effort normal   Cardiovascular: RRR  Gastrointestinal: Positive bowel sounds,  soft, nondistended  Musculoskeletal: No bilateral ankle edema. R hand contracture   Psychiatric: Flat affect   Neurologic: Confused     Pertinent  and/or Most Recent Results     LAB RESULTS:      Lab 04/26/24  1402 04/24/24  1428 04/24/24  1423   WBC  --   --  5.83   HEMOGLOBIN  --   --  14.9   HEMOGLOBIN, POC  --  15.3  --    HEMATOCRIT  --   --  45.3   HEMATOCRIT POC  --  45  --    PLATELETS  --   --  117*   NEUTROS ABS  --   --  4.05   IMMATURE GRANS (ABS)  --   --  0.03   LYMPHS ABS  --   --  1.32   MONOS ABS  --   --  0.34   EOS ABS  --   --  0.07   MCV  --   --  90.6   LACTATE 1.6  --   --    APTT  --   --  31.5         Lab 04/25/24  0444 04/24/24  1428 04/24/24  1423   SODIUM  --   --  141   POTASSIUM  --   --  4.0   CHLORIDE  --   --  101   CO2  --   --  32.0*   ANION GAP  --   --  8.0   BUN  --   --  32*   CREATININE  --  0.90 0.84   EGFR  --  66.8 72.6   GLUCOSE  --   --  139*   CALCIUM  --   --  9.6   HEMOGLOBIN A1C 6.70*  --   --    TSH  --   --  4.530*         Lab 04/24/24  1423   TOTAL PROTEIN 6.8   ALBUMIN 3.9   GLOBULIN 2.9   ALT (SGPT) 14  14   AST (SGOT) 18  18   BILIRUBIN 0.7   ALK PHOS 82         Lab 04/24/24  1423   HSTROP T 27*         Lab 04/25/24  0444   CHOLESTEROL 167   LDL CHOL 112*   HDL CHOL 39*   TRIGLYCERIDES 86     Brief Urine Lab Results  (Last result in the past 365 days)        Color   Clarity   Blood   Leuk Est   Nitrite   Protein   CREAT   Urine HCG        04/26/24 0847 Dark Yellow   Turbid   Negative   Trace   Negative   100 mg/dL (2+)                 Microbiology Results (last 10 days)       Procedure Component Value - Date/Time    Blood Culture - Blood, Hand, Right [[id number]]  (Normal) Collected: 04/26/24 1402    Lab Status: Preliminary result Specimen: Blood from Hand, Right Updated: 04/27/24 1430     Blood Culture No growth at 24 hours    Narrative:      Aerobic Bottle Only    Less than seven (7) mL's of blood was collected.  Insufficient quantity may yield false negative  results.    Blood Culture - Blood, Hand, Left [[id number]]  (Normal) Collected: 04/26/24 1402    Lab Status: Preliminary result Specimen: Blood from Hand, Left Updated: 04/27/24 1430     Blood Culture No growth at 24 hours    Narrative:      Aerobic Bottle Only    Less than seven (7) mL's of blood was collected.  Insufficient quantity may yield false negative results.    Urine Culture - Urine, Urine, Catheter [[id number]]  (Abnormal) Collected: 04/26/24 0847    Lab Status: Preliminary result Specimen: Urine, Catheter Updated: 04/28/24 1043     Urine Culture >100,000 CFU/mL Escherichia coli    Narrative:      Colonization of the urinary tract without infection is common. Treatment is discouraged unless the patient is symptomatic, pregnant, or undergoing an invasive urologic procedure.          MRI Brain Without Contrast    Result Date: 4/25/2024  MRI BRAIN WO CONTRAST Date of Exam: 4/25/2024 8:35 PM EDT Indication: Facial droop / CVA Facial droop / CVA.  Comparison: CT head 4/24/2024 Technique:  Routine multiplanar/multisequence sequence images of the brain were obtained without contrast administration. Findings: 1.6 cm region of diffusion restriction within the left periventricular corona radiata image 61 series 3. Inferiorly this extends to involve the posterior aspect of left putamen. Additional tiny scattered areas of diffusion restriction within the right posterior frontal lobe. No associated hemorrhage, significant edema or mass effect. Moderate global parenchymal volume loss. Moderate to severe patchy and confluent periventricular T2/FLAIR hyperintense signal.. No large extra-axial collection. No midline shift or hydrocephalus. No large mass lesion. Old 7 mm left pontine lacunar infarct. Atrophic corpus callosum. Negative for cerebellar tonsillar ectopia. Pituitary unremarkable. Major intracranial flow voids are preserved. Symmetric globes. No obstructive sinus disease. Trace mastoid fluid.     Impression: 1.  Small areas of recent (acute to subacute) infarcts involving bilateral MCA territories, suggesting embolic infarcts. No associated hemorrhage, mass effect or midline shift. 2. Moderate to severe chronic microvascular ischemic change and global parenchymal volume loss. Small chronic left pontine infarct. Electronically Signed: Dale Freeman MD  4/25/2024 9:12 PM EDT  Workstation ID: [id number]    FL Video Swallow With Speech Single Contrast    Result Date: 4/25/2024  FL VIDEO SWALLOW W SPEECH SINGLE-CONTRAST Date of Exam: 4/25/2024 2:17 PM EDT Indication: dysphagia.   Comparison: None available. Technique:   The speech pathologist administered food and/or liquid mixed with barium to the patient with cine/video imaging.  Imaging assistance was provided to the speech pathologist and an image was saved. Fluoroscopic Time: 1 minute and 24 seconds Number of Images: 12 associated fluoroscopic loops were saved Findings: Aspiration was seen during fluoroscopic guided modified barium swallowing series. Please see speech therapy report for full details and recommendations.     Impression: Fluoroscopy provided for a modified barium swallow. Aspiration was seen during swallowing evaluation. Please see speech therapy report for full details and recommendations. Report dictated by: Maribel Valencia PA-c  I have personally reviewed this case and agree with the findings above: Electronically Signed: Dayton Arthur MD  4/25/2024 4:52 PM EDT  Workstation ID: [id number]    Adult Transthoracic Echo Complete W/ Cont if Necessary Per Protocol (With Agitated Saline)    Result Date: 4/25/2024    Left ventricular ejection fraction appears to be 46 - 50%.   Left ventricular wall thickness is consistent with hypertrophy.   Mild aortic valve regurgitation is present.   Saline test results are negative.     EEG    Result Date: 4/25/2024  Reason for referral: 73 y.o.female with altered mental status, aphasia, speech changes Technical Summary:  A 19  channel digital EEG was performed using the international 10-20 placement system, including eye leads and EKG leads. Duration: 23 minutes Findings: The patient is awake.  Diffuse medium amplitude 4-6 Hz intermixed delta and theta activity is present symmetrically over both hemispheres.  A clear posterior rhythm is not seen.  EMG artifact is prominent over the frontal leads.  Sleep is seen with mild slowing of the background and vertex waves.  No focal features or epileptiform activity are present.  Photic stimulation does not change the background.  Hyperventilation is not performed. Video: Available Technical quality: Superior EKG: Regular, 60-70 bpm SUMMARY: Moderate generalized slow No focal features or epileptiform activity are seen     Diffuse cerebral dysfunction of moderate degree, nonspecific.  These findings are commonly seen with encephalopathy due to toxic/metabolic cause No evidence for epilepsy is present This report is transcribed using the Dragon dictation system.      XR Chest 1 View    Result Date: 4/24/2024  XR CHEST 1 VW Date of Exam: 4/24/2024 3:04 PM EDT Indication: Acute Stroke Protocol (onset < 12 hrs) Comparison: None available. Findings: Unremarkable cardiomediastinal silhouette. Elevated left hemidiaphragm. Chronic parenchymal lung changes. Adjacent compressive atelectasis. No focal airspace consolidation. No pleural effusion or pneumothorax. No acute osseous abnormality. Surgical clips  in the bilateral chest wall soft tissues.     Impression: No acute cardiopulmonary abnormality. Electronically Signed: James Montes MD  4/24/2024 3:21 PM EDT  Workstation ID: [id number]    CT Angiogram Head w AI Analysis of LVO    Result Date: 4/24/2024  CT ANGIOGRAM HEAD W AI ANALYSIS OF LVO, CT ANGIOGRAM NECK Date of Exam: 4/24/2024 2:26 PM EDT Indication: Neuro deficit, acute stroke suspected Neuro deficit, acute stroke suspected. Comparison: Concurrent noncontrast head CT Technique: CTA of the head and  neck was performed after the uneventful intravenous administration of 75 mL Isovue-370. Reconstructed coronal and sagittal images were also obtained. In addition, a 3-D volume rendered image was created for interpretation. Automated exposure control and iterative reconstruction methods were used. Findings: CTA head: No abrupt cut off/large vessel occlusion, flow-limiting stenosis, dissection, or aneurysm. The cerebral veins and dural venous sinuses are poorly opacified on this phase of imaging but are likely patent. CTA NECK: No abrupt cut off/large vessel occlusion, flow-limiting stenosis, dissection, or aneurysm. There is trace atherosclerosis of the bilateral carotid bifurcations without luminal irregularity or flow-limiting stenosis. Extravascular findings: Suspected elevation of the left hemidiaphragm, only partially imaged. Upper lungs are grossly clear. Homogeneous attenuation of the thyroid gland. No pharyngeal or laryngeal mass lesion. No acute or suspicious bony findings.     Impression: Essentially normal CTA of the head and neck. Electronically Signed: Dayton Arthur MD  4/24/2024 2:59 PM EDT  Workstation ID: [id number]    CT Angiogram Neck    Result Date: 4/24/2024  CT ANGIOGRAM HEAD W AI ANALYSIS OF LVO, CT ANGIOGRAM NECK Date of Exam: 4/24/2024 2:26 PM EDT Indication: Neuro deficit, acute stroke suspected Neuro deficit, acute stroke suspected. Comparison: Concurrent noncontrast head CT Technique: CTA of the head and neck was performed after the uneventful intravenous administration of 75 mL Isovue-370. Reconstructed coronal and sagittal images were also obtained. In addition, a 3-D volume rendered image was created for interpretation. Automated exposure control and iterative reconstruction methods were used. Findings: CTA head: No abrupt cut off/large vessel occlusion, flow-limiting stenosis, dissection, or aneurysm. The cerebral veins and dural venous sinuses are poorly opacified on this phase of  imaging but are likely patent. CTA NECK: No abrupt cut off/large vessel occlusion, flow-limiting stenosis, dissection, or aneurysm. There is trace atherosclerosis of the bilateral carotid bifurcations without luminal irregularity or flow-limiting stenosis. Extravascular findings: Suspected elevation of the left hemidiaphragm, only partially imaged. Upper lungs are grossly clear. Homogeneous attenuation of the thyroid gland. No pharyngeal or laryngeal mass lesion. No acute or suspicious bony findings.     Impression: Essentially normal CTA of the head and neck. Electronically Signed: Dayton Arthur MD  4/24/2024 2:59 PM EDT  Workstation ID: [id number]    CT Head Without Contrast Stroke Protocol    Result Date: 4/24/2024  CT HEAD WO CONTRAST STROKE PROTOCOL Date of Exam: 4/24/2024 2:22 PM EDT Indication: Neuro deficit, acute, stroke suspected Neuro deficit, acute stroke suspected. Comparison: None available. Technique: Axial CT images were obtained of the head without contrast administration.  Reconstructed coronal images were also obtained. Automated exposure control and iterative construction methods were used. Scan Time: 2:12 p.m. Results discussed with stroke team at 2:13 p.m. FINDINGS: Gray-white differentiation is maintained and there is no evidence of intracranial hemorrhage, mass or mass effect. Age-related changes of the brain are present including volume loss and typical periventricular sequela of chronic small vessel ischemia. There is otherwise no evidence of intracranial hemorrhage, mass or mass effect. The ventricles are normal in size and configuration accounting for surrounding volume loss. The orbits are normal and the paranasal sinuses are grossly clear.     Age-related changes of the brain as above, otherwise without evidence of acute intracranial abnormality. Electronically Signed: Thiago Hernandez MD  4/24/2024 2:51 PM EDT  Workstation ID: [id number]     Results for orders placed during the hospital  encounter of 04/24/24    Adult Transthoracic Echo Complete W/ Cont if Necessary Per Protocol (With Agitated Saline)    Interpretation Summary    Left ventricular ejection fraction appears to be 46 - 50%.    Left ventricular wall thickness is consistent with hypertrophy.    Mild aortic valve regurgitation is present.    Saline test results are negative.    Discharge Details        Discharge Medications        New Medications        Instructions Start Date   atorvastatin 40 MG tablet  Commonly known as: LIPITOR   40 mg, Oral, Nightly             Continue These Medications        Instructions Start Date   aspirin 325 MG tablet   325 mg, Oral, Daily      busPIRone 7.5 MG tablet  Commonly known as: BUSPAR   7.5 mg, Oral, 2 Times Daily      donepezil ODT 5 MG disintegrating tablet  Commonly known as: ARICEPT ODT   5 mg, Oral, Nightly      ferrous sulfate 325 (65 FE) MG tablet   325 mg, Oral, Daily With Breakfast      levothyroxine 75 MCG tablet  Commonly known as: SYNTHROID, LEVOTHROID   75 mcg, Oral, Daily      memantine 10 MG tablet  Commonly known as: NAMENDA   10 mg, Oral, 2 Times Daily      mirtazapine 7.5 MG tablet  Commonly known as: REMERON   7.5 mg, Oral, Nightly      omeprazole 20 MG capsule  Commonly known as: priLOSEC   20 mg, Oral, Every Morning Before Breakfast      senna 8.6 MG tablet  Commonly known as: SENOKOT   1 tablet, Oral, Daily      traZODone 50 MG tablet  Commonly known as: DESYREL   50 mg, Oral, Nightly      Vitamin D (Ergocalciferol) 50 MCG (2000 UT) capsule   1 capsule, Oral, Daily             Allergies   Allergen Reactions    Amoxicillin Unknown - Low Severity    Darvon [Propoxyphene] Unknown - Low Severity     Discharge Disposition:  Long Term Care (DC - External)    Diet:  Diet Instructions       Diet: Regular/House Diet; Pureed (NDD 1); Pudding Thick      Discharge Diet: Regular/House Diet    Texture: Pureed (NDD 1)    Fluid Consistency: Pudding Thick           Activity:  Activity  Instructions       Activity as Tolerated      Up WIth Assist            CODE STATUS:    Code Status and Medical Interventions:   Ordered at: 04/24/24 6181     Medical Intervention Limits:    NO intubation (DNI)     Code Status (Patient has no pulse and is not breathing):    No CPR (Do Not Attempt to Resuscitate)     Medical Interventions (Patient has pulse or is breathing):    Limited Support     No future appointments.    Diane Delgado PA-C  04/28/24    Time Spent on Discharge:  I spent 35 minutes on this discharge activity which included: face-to-face encounter with the patient, reviewing the data in the system, coordination of the care with the nursing staff as well as consultants, documentation, and entering orders.              Electronically signed by Khadar Boswell MD at 04/28/24 5443

## 2024-04-28 NOTE — DISCHARGE SUMMARY
[facility] Medicine Services  DISCHARGE SUMMARY    Patient Name: Rolanda Damon  : 1950  MRN: [id number]    Date of Admission: 2024  2:18 PM  Date of Discharge:  2024  Primary Care Physician: Shayna Og APRN    Consults       Date and Time Order Name Status Description    2024  2:22 PM Inpatient Neurology Consult Stroke Completed           Hospital Course     Active Hospital Problems    Diagnosis  POA    Other hyperlipidemia [E78.49]  Yes    Cerebrovascular accident (CVA) due to bilateral embolism of middle cerebral arteries [I63.413]  Yes    Type 2 diabetes mellitus [E11.9]  Yes    History of breast cancer [Z85.3]  Not Applicable    Personal history of transient ischemic attack (TIA), and cerebral infarction without residual deficits [Z86.73]  Not Applicable    Thrombocytopenia [D69.6]  Yes    Dementia without behavioral disturbance [F03.90]  Yes      Resolved Hospital Problems    Diagnosis Date Resolved POA    **Facial droop [R29.810] 2024 Yes      Hospital Course:  Ms. Rolanda Damon is a 73yoF with PMH significant for HTN, HLD, diet-controlled DMII, prior TIA, hypothyroidism (no longer on medication with baseline elevated TSH), breast cancer s/p bilateral mastectomy and dementia. She is a long-term care resident of [facility]. She was brought to [facility] ED on 2024 for evaluation of decreased responsiveness and facial droop since around 12pm on 24. She was admitted to the hospital medicine service for stroke evaluation    Acute vs subacute bilateral MCA territory CVA, embolic etiology suspected  History of TIA  - Patient presented with R facial droop and decreased responsiveness  - Appreciate stroke neurology team evaluation  - CT head and CTA head/neck reassuring  - MRI brain showed small areas of recent acute/subacute infarcts involving the bilateral MCA territories suggestive of embolic infarcts. No  hemorrhage, mass effect or midline shift noted  - EEG without seizure activity   - ECHO with EF 46-50%, LV hypertrophy  - A1c 6.7%  - Lipid profile with total cholesterol 167,   - Stroke team discussed with patient's daughter/legal guardian, [place]. Full anticoagulation deferred given patient' history of diverticulitis, risk of bleeding and poor baseline functional status  - Plan to DC on ASA 325mg daily and high-intensity statin    Dementia  - Patient is nonambulatory at baseline. She is able to speak but speech is incoherent  - Continue routine Aricept, Namenda, BuSpar and Trazodone  - Back to [facility] today     History of tonic clonic seizures  - Previously on Levetiracetam but stopped due to fatigue/lethargy    E. Coli UTI  - UA with 4+ bacteria, trace leukocytes  - Unable to assess symptoms  - Blood cultures NGTD  - Urine culture (+) > 100,000 CFU E. Coli   - s/p IV Rocephin x 3 doses. Stop antibiotics at DC      History of hypertension, hyperlipidemia, hypothyroidism, gout  - Patient is no longer on medications for above medical issues  - BP stable   -  - started on Atorvastatin this admission   - Continue Synthroid     Day of Discharge     HPI:   Sitting up in bed. Awake but does not interact.     Review of Systems  Unable to obtain complete or reliable ROS due to dementia     Vital Signs:   Temp:  [97.1 °F (36.2 °C)-97.9 °F (36.6 °C)] 97.9 °F (36.6 °C)  Heart Rate:  [52-81] 81  Resp:  [15-16] 16  BP: (117-153)/(69-92) 153/92  Flow (L/min):  [2] 2    Physical Exam:  Constitutional: No acute distress, awake and alert. Does not interact. No family at bedside   HENT: NCAT, mucous membranes moist. Poor dentition   Respiratory: Clear to auscultation bilaterally, respiratory effort normal   Cardiovascular: RRR  Gastrointestinal: Positive bowel sounds, soft, nondistended  Musculoskeletal: No bilateral ankle edema. R hand contracture   Psychiatric: Flat affect   Neurologic: Confused     Pertinent  and/or  Most Recent Results     LAB RESULTS:      Lab 04/26/24  1402 04/24/24  1428 04/24/24  1423   WBC  --   --  5.83   HEMOGLOBIN  --   --  14.9   HEMOGLOBIN, POC  --  15.3  --    HEMATOCRIT  --   --  45.3   HEMATOCRIT POC  --  45  --    PLATELETS  --   --  117*   NEUTROS ABS  --   --  4.05   IMMATURE GRANS (ABS)  --   --  0.03   LYMPHS ABS  --   --  1.32   MONOS ABS  --   --  0.34   EOS ABS  --   --  0.07   MCV  --   --  90.6   LACTATE 1.6  --   --    APTT  --   --  31.5         Lab 04/25/24  0444 04/24/24  1428 04/24/24  1423   SODIUM  --   --  141   POTASSIUM  --   --  4.0   CHLORIDE  --   --  101   CO2  --   --  32.0*   ANION GAP  --   --  8.0   BUN  --   --  32*   CREATININE  --  0.90 0.84   EGFR  --  66.8 72.6   GLUCOSE  --   --  139*   CALCIUM  --   --  9.6   HEMOGLOBIN A1C 6.70*  --   --    TSH  --   --  4.530*         Lab 04/24/24  1423   TOTAL PROTEIN 6.8   ALBUMIN 3.9   GLOBULIN 2.9   ALT (SGPT) 14  14   AST (SGOT) 18  18   BILIRUBIN 0.7   ALK PHOS 82         Lab 04/24/24  1423   HSTROP T 27*         Lab 04/25/24  0444   CHOLESTEROL 167   LDL CHOL 112*   HDL CHOL 39*   TRIGLYCERIDES 86     Brief Urine Lab Results  (Last result in the past 365 days)        Color   Clarity   Blood   Leuk Est   Nitrite   Protein   CREAT   Urine HCG        04/26/24 0847 Dark Yellow   Turbid   Negative   Trace   Negative   100 mg/dL (2+)                 Microbiology Results (last 10 days)       Procedure Component Value - Date/Time    Blood Culture - Blood, Hand, Right [[id number]]  (Normal) Collected: 04/26/24 1402    Lab Status: Preliminary result Specimen: Blood from Hand, Right Updated: 04/27/24 1430     Blood Culture No growth at 24 hours    Narrative:      Aerobic Bottle Only    Less than seven (7) mL's of blood was collected.  Insufficient quantity may yield false negative results.    Blood Culture - Blood, Hand, Left [[id number]]  (Normal) Collected: 04/26/24 1402    Lab Status: Preliminary result Specimen: Blood from Hand,  Left Updated: 04/27/24 1430     Blood Culture No growth at 24 hours    Narrative:      Aerobic Bottle Only    Less than seven (7) mL's of blood was collected.  Insufficient quantity may yield false negative results.    Urine Culture - Urine, Urine, Catheter [[id number]]  (Abnormal) Collected: 04/26/24 0847    Lab Status: Preliminary result Specimen: Urine, Catheter Updated: 04/28/24 1043     Urine Culture >100,000 CFU/mL Escherichia coli    Narrative:      Colonization of the urinary tract without infection is common. Treatment is discouraged unless the patient is symptomatic, pregnant, or undergoing an invasive urologic procedure.          MRI Brain Without Contrast    Result Date: 4/25/2024  MRI BRAIN WO CONTRAST Date of Exam: 4/25/2024 8:35 PM EDT Indication: Facial droop / CVA Facial droop / CVA.  Comparison: CT head 4/24/2024 Technique:  Routine multiplanar/multisequence sequence images of the brain were obtained without contrast administration. Findings: 1.6 cm region of diffusion restriction within the left periventricular corona radiata image 61 series 3. Inferiorly this extends to involve the posterior aspect of left putamen. Additional tiny scattered areas of diffusion restriction within the right posterior frontal lobe. No associated hemorrhage, significant edema or mass effect. Moderate global parenchymal volume loss. Moderate to severe patchy and confluent periventricular T2/FLAIR hyperintense signal.. No large extra-axial collection. No midline shift or hydrocephalus. No large mass lesion. Old 7 mm left pontine lacunar infarct. Atrophic corpus callosum. Negative for cerebellar tonsillar ectopia. Pituitary unremarkable. Major intracranial flow voids are preserved. Symmetric globes. No obstructive sinus disease. Trace mastoid fluid.     Impression: 1. Small areas of recent (acute to subacute) infarcts involving bilateral MCA territories, suggesting embolic infarcts. No associated hemorrhage, mass effect  or midline shift. 2. Moderate to severe chronic microvascular ischemic change and global parenchymal volume loss. Small chronic left pontine infarct. Electronically Signed: Dale Freeman MD  4/25/2024 9:12 PM EDT  Workstation ID: [id number]    FL Video Swallow With Speech Single Contrast    Result Date: 4/25/2024  FL VIDEO SWALLOW W SPEECH SINGLE-CONTRAST Date of Exam: 4/25/2024 2:17 PM EDT Indication: dysphagia.   Comparison: None available. Technique:   The speech pathologist administered food and/or liquid mixed with barium to the patient with cine/video imaging.  Imaging assistance was provided to the speech pathologist and an image was saved. Fluoroscopic Time: 1 minute and 24 seconds Number of Images: 12 associated fluoroscopic loops were saved Findings: Aspiration was seen during fluoroscopic guided modified barium swallowing series. Please see speech therapy report for full details and recommendations.     Impression: Fluoroscopy provided for a modified barium swallow. Aspiration was seen during swallowing evaluation. Please see speech therapy report for full details and recommendations. Report dictated by: Maribel Valencia PA-c  I have personally reviewed this case and agree with the findings above: Electronically Signed: Dayton Arthur MD  4/25/2024 4:52 PM EDT  Workstation ID: [id number]    Adult Transthoracic Echo Complete W/ Cont if Necessary Per Protocol (With Agitated Saline)    Result Date: 4/25/2024    Left ventricular ejection fraction appears to be 46 - 50%.   Left ventricular wall thickness is consistent with hypertrophy.   Mild aortic valve regurgitation is present.   Saline test results are negative.     EEG    Result Date: 4/25/2024  Reason for referral: 73 y.o.female with altered mental status, aphasia, speech changes Technical Summary:  A 19 channel digital EEG was performed using the international 10-20 placement system, including eye leads and EKG leads. Duration: 23 minutes Findings: The  patient is awake.  Diffuse medium amplitude 4-6 Hz intermixed delta and theta activity is present symmetrically over both hemispheres.  A clear posterior rhythm is not seen.  EMG artifact is prominent over the frontal leads.  Sleep is seen with mild slowing of the background and vertex waves.  No focal features or epileptiform activity are present.  Photic stimulation does not change the background.  Hyperventilation is not performed. Video: Available Technical quality: Superior EKG: Regular, 60-70 bpm SUMMARY: Moderate generalized slow No focal features or epileptiform activity are seen     Diffuse cerebral dysfunction of moderate degree, nonspecific.  These findings are commonly seen with encephalopathy due to toxic/metabolic cause No evidence for epilepsy is present This report is transcribed using the Dragon dictation system.      XR Chest 1 View    Result Date: 4/24/2024  XR CHEST 1 VW Date of Exam: 4/24/2024 3:04 PM EDT Indication: Acute Stroke Protocol (onset < 12 hrs) Comparison: None available. Findings: Unremarkable cardiomediastinal silhouette. Elevated left hemidiaphragm. Chronic parenchymal lung changes. Adjacent compressive atelectasis. No focal airspace consolidation. No pleural effusion or pneumothorax. No acute osseous abnormality. Surgical clips  in the bilateral chest wall soft tissues.     Impression: No acute cardiopulmonary abnormality. Electronically Signed: James Montes MD  4/24/2024 3:21 PM EDT  Workstation ID: [id number]    CT Angiogram Head w AI Analysis of LVO    Result Date: 4/24/2024  CT ANGIOGRAM HEAD W AI ANALYSIS OF LVO, CT ANGIOGRAM NECK Date of Exam: 4/24/2024 2:26 PM EDT Indication: Neuro deficit, acute stroke suspected Neuro deficit, acute stroke suspected. Comparison: Concurrent noncontrast head CT Technique: CTA of the head and neck was performed after the uneventful intravenous administration of 75 mL Isovue-370. Reconstructed coronal and sagittal images were also obtained. In  addition, a 3-D volume rendered image was created for interpretation. Automated exposure control and iterative reconstruction methods were used. Findings: CTA head: No abrupt cut off/large vessel occlusion, flow-limiting stenosis, dissection, or aneurysm. The cerebral veins and dural venous sinuses are poorly opacified on this phase of imaging but are likely patent. CTA NECK: No abrupt cut off/large vessel occlusion, flow-limiting stenosis, dissection, or aneurysm. There is trace atherosclerosis of the bilateral carotid bifurcations without luminal irregularity or flow-limiting stenosis. Extravascular findings: Suspected elevation of the left hemidiaphragm, only partially imaged. Upper lungs are grossly clear. Homogeneous attenuation of the thyroid gland. No pharyngeal or laryngeal mass lesion. No acute or suspicious bony findings.     Impression: Essentially normal CTA of the head and neck. Electronically Signed: Dayton Arthur MD  4/24/2024 2:59 PM EDT  Workstation ID: [id number]    CT Angiogram Neck    Result Date: 4/24/2024  CT ANGIOGRAM HEAD W AI ANALYSIS OF LVO, CT ANGIOGRAM NECK Date of Exam: 4/24/2024 2:26 PM EDT Indication: Neuro deficit, acute stroke suspected Neuro deficit, acute stroke suspected. Comparison: Concurrent noncontrast head CT Technique: CTA of the head and neck was performed after the uneventful intravenous administration of 75 mL Isovue-370. Reconstructed coronal and sagittal images were also obtained. In addition, a 3-D volume rendered image was created for interpretation. Automated exposure control and iterative reconstruction methods were used. Findings: CTA head: No abrupt cut off/large vessel occlusion, flow-limiting stenosis, dissection, or aneurysm. The cerebral veins and dural venous sinuses are poorly opacified on this phase of imaging but are likely patent. CTA NECK: No abrupt cut off/large vessel occlusion, flow-limiting stenosis, dissection, or aneurysm. There is trace  atherosclerosis of the bilateral carotid bifurcations without luminal irregularity or flow-limiting stenosis. Extravascular findings: Suspected elevation of the left hemidiaphragm, only partially imaged. Upper lungs are grossly clear. Homogeneous attenuation of the thyroid gland. No pharyngeal or laryngeal mass lesion. No acute or suspicious bony findings.     Impression: Essentially normal CTA of the head and neck. Electronically Signed: Dayton Arthur MD  4/24/2024 2:59 PM EDT  Workstation ID: [id number]    CT Head Without Contrast Stroke Protocol    Result Date: 4/24/2024  CT HEAD WO CONTRAST STROKE PROTOCOL Date of Exam: 4/24/2024 2:22 PM EDT Indication: Neuro deficit, acute, stroke suspected Neuro deficit, acute stroke suspected. Comparison: None available. Technique: Axial CT images were obtained of the head without contrast administration.  Reconstructed coronal images were also obtained. Automated exposure control and iterative construction methods were used. Scan Time: 2:12 p.m. Results discussed with stroke team at 2:13 p.m. FINDINGS: Gray-white differentiation is maintained and there is no evidence of intracranial hemorrhage, mass or mass effect. Age-related changes of the brain are present including volume loss and typical periventricular sequela of chronic small vessel ischemia. There is otherwise no evidence of intracranial hemorrhage, mass or mass effect. The ventricles are normal in size and configuration accounting for surrounding volume loss. The orbits are normal and the paranasal sinuses are grossly clear.     Age-related changes of the brain as above, otherwise without evidence of acute intracranial abnormality. Electronically Signed: Thiago Hernandez MD  4/24/2024 2:51 PM EDT  Workstation ID: [id number]     Results for orders placed during the hospital encounter of 04/24/24    Adult Transthoracic Echo Complete W/ Cont if Necessary Per Protocol (With Agitated Saline)    Interpretation Summary    Left  ventricular ejection fraction appears to be 46 - 50%.    Left ventricular wall thickness is consistent with hypertrophy.    Mild aortic valve regurgitation is present.    Saline test results are negative.    Discharge Details        Discharge Medications        New Medications        Instructions Start Date   atorvastatin 40 MG tablet  Commonly known as: LIPITOR   40 mg, Oral, Nightly             Continue These Medications        Instructions Start Date   aspirin 325 MG tablet   325 mg, Oral, Daily      busPIRone 7.5 MG tablet  Commonly known as: BUSPAR   7.5 mg, Oral, 2 Times Daily      donepezil ODT 5 MG disintegrating tablet  Commonly known as: ARICEPT ODT   5 mg, Oral, Nightly      ferrous sulfate 325 (65 FE) MG tablet   325 mg, Oral, Daily With Breakfast      levothyroxine 75 MCG tablet  Commonly known as: SYNTHROID, LEVOTHROID   75 mcg, Oral, Daily      memantine 10 MG tablet  Commonly known as: NAMENDA   10 mg, Oral, 2 Times Daily      mirtazapine 7.5 MG tablet  Commonly known as: REMERON   7.5 mg, Oral, Nightly      omeprazole 20 MG capsule  Commonly known as: priLOSEC   20 mg, Oral, Every Morning Before Breakfast      senna 8.6 MG tablet  Commonly known as: SENOKOT   1 tablet, Oral, Daily      traZODone 50 MG tablet  Commonly known as: DESYREL   50 mg, Oral, Nightly      Vitamin D (Ergocalciferol) 50 MCG (2000 UT) capsule   1 capsule, Oral, Daily             Allergies   Allergen Reactions    Amoxicillin Unknown - Low Severity    Darvon [Propoxyphene] Unknown - Low Severity     Discharge Disposition:  Long Term Care (DC - External)    Diet:  Diet Instructions       Diet: Regular/House Diet; Pureed (NDD 1); Pudding Thick      Discharge Diet: Regular/House Diet    Texture: Pureed (NDD 1)    Fluid Consistency: Pudding Thick           Activity:  Activity Instructions       Activity as Tolerated      Up WIth Assist            CODE STATUS:    Code Status and Medical Interventions:   Ordered at: 04/24/24 5631      Medical Intervention Limits:    NO intubation (DNI)     Code Status (Patient has no pulse and is not breathing):    No CPR (Do Not Attempt to Resuscitate)     Medical Interventions (Patient has pulse or is breathing):    Limited Support     No future appointments.    Diane Delgado PA-C  04/28/24    Time Spent on Discharge:  I spent 35 minutes on this discharge activity which included: face-to-face encounter with the patient, reviewing the data in the system, coordination of the care with the nursing staff as well as consultants, documentation, and entering orders.

## 2024-04-28 NOTE — DISCHARGE PLACEMENT REQUEST
"FROM: Gertrude VASQUEZ, RN Case Manager  [phone]  Rolanda Damon (73 y.o. Female)       Date of Birth   1950    Social Security Number       Address   [address] [facility] [address]    Home Phone   [phone]    MRN   [id number]       Church   [employer]    Marital Status                               Admission Date   4/24/24    Admission Type   Emergency    Admitting Provider   Kahdar Boswell MD    Attending Provider   Khadar Boswell MD    Department, Room/Bed   [facility] 3F, S322/1       Discharge Date       Discharge Disposition   Long Term Care (DC - External)    Discharge Destination                                 Attending Provider: Khadar Boswell MD    Allergies: Amoxicillin, Darvon [Propoxyphene]    Isolation: None   Infection: None   Code Status: No CPR    Ht: 157.5 cm (62\")   Wt: 54.4 kg (120 lb)    Admission Cmt: None   Principal Problem: Facial droop [R29.810]                   Active Insurance as of 4/24/2024       Primary Coverage       Payor Plan Insurance Group Employer/Plan Group    MEDICARE MEDICARE A & B        Payor Plan Address Payor Plan Phone Number Payor Plan Fax Number Effective Dates    [address] [phone]  5/1/2006 - None Entered    [facility] [id number]         Subscriber Name Subscriber Birth Date Member ID       ROLANDA DAMON 1950 [id number]               Secondary Coverage       Payor Plan Insurance Group Employer/Plan Group    [employer]HT INS [employer]        Payor Plan Address Payor Plan Phone Number Payor Plan Fax Number Effective Dates    [address] [phone]  1/1/2014 - None Entered    [facility] [id number]         Subscriber Name Subscriber Birth Date Member ID       ROLANDA DAMON 1950 [id number]                     Emergency Contacts        (Rel.) Home Phone Work Phone Mobile Phone    BRANDI CHAVEZ (Legal Guardian) [phone] -- --                 Discharge Summary        Diane Delgado, " DAMARIS at [phone]              [facility] Hospital Medicine Services  DISCHARGE SUMMARY    Patient Name: Rolanda Damon  : 1950  MRN: [id number]    Date of Admission: 2024  2:18 PM  Date of Discharge:  2024  Primary Care Physician: Shayna Og APRN    Consults       Date and Time Order Name Status Description    2024  2:22 PM Inpatient Neurology Consult Stroke Completed           Hospital Course     Active Hospital Problems    Diagnosis  POA    Other hyperlipidemia [E78.49]  Yes    Cerebrovascular accident (CVA) due to bilateral embolism of middle cerebral arteries [I63.413]  Yes    Type 2 diabetes mellitus [E11.9]  Yes    History of breast cancer [Z85.3]  Not Applicable    Personal history of transient ischemic attack (TIA), and cerebral infarction without residual deficits [Z86.73]  Not Applicable    Thrombocytopenia [D69.6]  Yes    Dementia without behavioral disturbance [F03.90]  Yes      Resolved Hospital Problems    Diagnosis Date Resolved POA    **Facial droop [R29.810] 2024 Yes      Hospital Course:  Ms. Rolanda Damon is a 73yoF with PMH significant for HTN, HLD, diet-controlled DMII, prior TIA, hypothyroidism (no longer on medication with baseline elevated TSH), breast cancer s/p bilateral mastectomy and dementia. She is a long-term care resident of [facility]. She was brought to [facility] ED on 2024 for evaluation of decreased responsiveness and facial droop since around 12pm on 24. She was admitted to the hospital medicine service for stroke evaluation    Acute vs subacute bilateral MCA territory CVA, embolic etiology suspected  History of TIA  - Patient presented with R facial droop and decreased responsiveness  - Appreciate stroke neurology team evaluation  - CT head and CTA head/neck reassuring  - MRI brain showed small areas of recent acute/subacute infarcts involving the bilateral MCA territories suggestive  of embolic infarcts. No hemorrhage, mass effect or midline shift noted  - EEG without seizure activity   - ECHO with EF 46-50%, LV hypertrophy  - A1c 6.7%  - Lipid profile with total cholesterol 167,   - Stroke team discussed with patient's daughter/legal guardian, [place]. Full anticoagulation deferred given patient' history of diverticulitis, risk of bleeding and poor baseline functional status  - Plan to DC on ASA 325mg daily and high-intensity statin    Dementia  - Patient is nonambulatory at baseline. She is able to speak but speech is incoherent  - Continue routine Aricept, Namenda, BuSpar and Trazodone  - Back to [facility] today     History of tonic clonic seizures  - Previously on Levetiracetam but stopped due to fatigue/lethargy    E. Coli UTI  - UA with 4+ bacteria, trace leukocytes  - Unable to assess symptoms  - Blood cultures NGTD  - Urine culture (+) > 100,000 CFU E. Coli   - s/p IV Rocephin x 3 doses. Stop antibiotics at DC      History of hypertension, hyperlipidemia, hypothyroidism, gout  - Patient is no longer on medications for above medical issues  - BP stable   -  - started on Atorvastatin this admission   - Continue Synthroid     Day of Discharge     HPI:   Sitting up in bed. Awake but does not interact.     Review of Systems  Unable to obtain complete or reliable ROS due to dementia     Vital Signs:   Temp:  [97.1 °F (36.2 °C)-97.9 °F (36.6 °C)] 97.9 °F (36.6 °C)  Heart Rate:  [52-81] 81  Resp:  [15-16] 16  BP: (117-153)/(69-92) 153/92  Flow (L/min):  [2] 2    Physical Exam:  Constitutional: No acute distress, awake and alert. Does not interact. No family at bedside   HENT: NCAT, mucous membranes moist. Poor dentition   Respiratory: Clear to auscultation bilaterally, respiratory effort normal   Cardiovascular: RRR  Gastrointestinal: Positive bowel sounds, soft, nondistended  Musculoskeletal: No bilateral ankle edema. R hand contracture   Psychiatric: Flat affect   Neurologic: Confused      Pertinent  and/or Most Recent Results     LAB RESULTS:      Lab 04/26/24  1402 04/24/24  1428 04/24/24  1423   WBC  --   --  5.83   HEMOGLOBIN  --   --  14.9   HEMOGLOBIN, POC  --  15.3  --    HEMATOCRIT  --   --  45.3   HEMATOCRIT POC  --  45  --    PLATELETS  --   --  117*   NEUTROS ABS  --   --  4.05   IMMATURE GRANS (ABS)  --   --  0.03   LYMPHS ABS  --   --  1.32   MONOS ABS  --   --  0.34   EOS ABS  --   --  0.07   MCV  --   --  90.6   LACTATE 1.6  --   --    APTT  --   --  31.5         Lab 04/25/24  0444 04/24/24  1428 04/24/24  1423   SODIUM  --   --  141   POTASSIUM  --   --  4.0   CHLORIDE  --   --  101   CO2  --   --  32.0*   ANION GAP  --   --  8.0   BUN  --   --  32*   CREATININE  --  0.90 0.84   EGFR  --  66.8 72.6   GLUCOSE  --   --  139*   CALCIUM  --   --  9.6   HEMOGLOBIN A1C 6.70*  --   --    TSH  --   --  4.530*         Lab 04/24/24  1423   TOTAL PROTEIN 6.8   ALBUMIN 3.9   GLOBULIN 2.9   ALT (SGPT) 14  14   AST (SGOT) 18  18   BILIRUBIN 0.7   ALK PHOS 82         Lab 04/24/24  1423   HSTROP T 27*         Lab 04/25/24  0444   CHOLESTEROL 167   LDL CHOL 112*   HDL CHOL 39*   TRIGLYCERIDES 86     Brief Urine Lab Results  (Last result in the past 365 days)        Color   Clarity   Blood   Leuk Est   Nitrite   Protein   CREAT   Urine HCG        04/26/24 0847 Dark Yellow   Turbid   Negative   Trace   Negative   100 mg/dL (2+)                 Microbiology Results (last 10 days)       Procedure Component Value - Date/Time    Blood Culture - Blood, Hand, Right [[id number]]  (Normal) Collected: 04/26/24 1402    Lab Status: Preliminary result Specimen: Blood from Hand, Right Updated: 04/27/24 1430     Blood Culture No growth at 24 hours    Narrative:      Aerobic Bottle Only    Less than seven (7) mL's of blood was collected.  Insufficient quantity may yield false negative results.    Blood Culture - Blood, Hand, Left [[id number]]  (Normal) Collected: 04/26/24 1402    Lab Status: Preliminary result  Specimen: Blood from Hand, Left Updated: 04/27/24 1430     Blood Culture No growth at 24 hours    Narrative:      Aerobic Bottle Only    Less than seven (7) mL's of blood was collected.  Insufficient quantity may yield false negative results.    Urine Culture - Urine, Urine, Catheter [[id number]]  (Abnormal) Collected: 04/26/24 0847    Lab Status: Preliminary result Specimen: Urine, Catheter Updated: 04/28/24 1043     Urine Culture >100,000 CFU/mL Escherichia coli    Narrative:      Colonization of the urinary tract without infection is common. Treatment is discouraged unless the patient is symptomatic, pregnant, or undergoing an invasive urologic procedure.          MRI Brain Without Contrast    Result Date: 4/25/2024  MRI BRAIN WO CONTRAST Date of Exam: 4/25/2024 8:35 PM EDT Indication: Facial droop / CVA Facial droop / CVA.  Comparison: CT head 4/24/2024 Technique:  Routine multiplanar/multisequence sequence images of the brain were obtained without contrast administration. Findings: 1.6 cm region of diffusion restriction within the left periventricular corona radiata image 61 series 3. Inferiorly this extends to involve the posterior aspect of left putamen. Additional tiny scattered areas of diffusion restriction within the right posterior frontal lobe. No associated hemorrhage, significant edema or mass effect. Moderate global parenchymal volume loss. Moderate to severe patchy and confluent periventricular T2/FLAIR hyperintense signal.. No large extra-axial collection. No midline shift or hydrocephalus. No large mass lesion. Old 7 mm left pontine lacunar infarct. Atrophic corpus callosum. Negative for cerebellar tonsillar ectopia. Pituitary unremarkable. Major intracranial flow voids are preserved. Symmetric globes. No obstructive sinus disease. Trace mastoid fluid.     Impression: 1. Small areas of recent (acute to subacute) infarcts involving bilateral MCA territories, suggesting embolic infarcts. No  associated hemorrhage, mass effect or midline shift. 2. Moderate to severe chronic microvascular ischemic change and global parenchymal volume loss. Small chronic left pontine infarct. Electronically Signed: Dale Freeman MD  4/25/2024 9:12 PM EDT  Workstation ID: [id number]    FL Video Swallow With Speech Single Contrast    Result Date: 4/25/2024  FL VIDEO SWALLOW W SPEECH SINGLE-CONTRAST Date of Exam: 4/25/2024 2:17 PM EDT Indication: dysphagia.   Comparison: None available. Technique:   The speech pathologist administered food and/or liquid mixed with barium to the patient with cine/video imaging.  Imaging assistance was provided to the speech pathologist and an image was saved. Fluoroscopic Time: 1 minute and 24 seconds Number of Images: 12 associated fluoroscopic loops were saved Findings: Aspiration was seen during fluoroscopic guided modified barium swallowing series. Please see speech therapy report for full details and recommendations.     Impression: Fluoroscopy provided for a modified barium swallow. Aspiration was seen during swallowing evaluation. Please see speech therapy report for full details and recommendations. Report dictated by: Maribel Valencia PA-c  I have personally reviewed this case and agree with the findings above: Electronically Signed: Dayton Arthur MD  4/25/2024 4:52 PM EDT  Workstation ID: [id number]    Adult Transthoracic Echo Complete W/ Cont if Necessary Per Protocol (With Agitated Saline)    Result Date: 4/25/2024    Left ventricular ejection fraction appears to be 46 - 50%.   Left ventricular wall thickness is consistent with hypertrophy.   Mild aortic valve regurgitation is present.   Saline test results are negative.     EEG    Result Date: 4/25/2024  Reason for referral: 73 y.o.female with altered mental status, aphasia, speech changes Technical Summary:  A 19 channel digital EEG was performed using the international 10-20 placement system, including eye leads and EKG leads.  Duration: 23 minutes Findings: The patient is awake.  Diffuse medium amplitude 4-6 Hz intermixed delta and theta activity is present symmetrically over both hemispheres.  A clear posterior rhythm is not seen.  EMG artifact is prominent over the frontal leads.  Sleep is seen with mild slowing of the background and vertex waves.  No focal features or epileptiform activity are present.  Photic stimulation does not change the background.  Hyperventilation is not performed. Video: Available Technical quality: Superior EKG: Regular, 60-70 bpm SUMMARY: Moderate generalized slow No focal features or epileptiform activity are seen     Diffuse cerebral dysfunction of moderate degree, nonspecific.  These findings are commonly seen with encephalopathy due to toxic/metabolic cause No evidence for epilepsy is present This report is transcribed using the Dragon dictation system.      XR Chest 1 View    Result Date: 4/24/2024  XR CHEST 1 VW Date of Exam: 4/24/2024 3:04 PM EDT Indication: Acute Stroke Protocol (onset < 12 hrs) Comparison: None available. Findings: Unremarkable cardiomediastinal silhouette. Elevated left hemidiaphragm. Chronic parenchymal lung changes. Adjacent compressive atelectasis. No focal airspace consolidation. No pleural effusion or pneumothorax. No acute osseous abnormality. Surgical clips  in the bilateral chest wall soft tissues.     Impression: No acute cardiopulmonary abnormality. Electronically Signed: James Montes MD  4/24/2024 3:21 PM EDT  Workstation ID: [id number]    CT Angiogram Head w AI Analysis of LVO    Result Date: 4/24/2024  CT ANGIOGRAM HEAD W AI ANALYSIS OF LVO, CT ANGIOGRAM NECK Date of Exam: 4/24/2024 2:26 PM EDT Indication: Neuro deficit, acute stroke suspected Neuro deficit, acute stroke suspected. Comparison: Concurrent noncontrast head CT Technique: CTA of the head and neck was performed after the uneventful intravenous administration of 75 mL Isovue-370. Reconstructed coronal and  sagittal images were also obtained. In addition, a 3-D volume rendered image was created for interpretation. Automated exposure control and iterative reconstruction methods were used. Findings: CTA head: No abrupt cut off/large vessel occlusion, flow-limiting stenosis, dissection, or aneurysm. The cerebral veins and dural venous sinuses are poorly opacified on this phase of imaging but are likely patent. CTA NECK: No abrupt cut off/large vessel occlusion, flow-limiting stenosis, dissection, or aneurysm. There is trace atherosclerosis of the bilateral carotid bifurcations without luminal irregularity or flow-limiting stenosis. Extravascular findings: Suspected elevation of the left hemidiaphragm, only partially imaged. Upper lungs are grossly clear. Homogeneous attenuation of the thyroid gland. No pharyngeal or laryngeal mass lesion. No acute or suspicious bony findings.     Impression: Essentially normal CTA of the head and neck. Electronically Signed: Dayton Arthur MD  4/24/2024 2:59 PM EDT  Workstation ID: [id number]    CT Angiogram Neck    Result Date: 4/24/2024  CT ANGIOGRAM HEAD W AI ANALYSIS OF LVO, CT ANGIOGRAM NECK Date of Exam: 4/24/2024 2:26 PM EDT Indication: Neuro deficit, acute stroke suspected Neuro deficit, acute stroke suspected. Comparison: Concurrent noncontrast head CT Technique: CTA of the head and neck was performed after the uneventful intravenous administration of 75 mL Isovue-370. Reconstructed coronal and sagittal images were also obtained. In addition, a 3-D volume rendered image was created for interpretation. Automated exposure control and iterative reconstruction methods were used. Findings: CTA head: No abrupt cut off/large vessel occlusion, flow-limiting stenosis, dissection, or aneurysm. The cerebral veins and dural venous sinuses are poorly opacified on this phase of imaging but are likely patent. CTA NECK: No abrupt cut off/large vessel occlusion, flow-limiting stenosis, dissection,  or aneurysm. There is trace atherosclerosis of the bilateral carotid bifurcations without luminal irregularity or flow-limiting stenosis. Extravascular findings: Suspected elevation of the left hemidiaphragm, only partially imaged. Upper lungs are grossly clear. Homogeneous attenuation of the thyroid gland. No pharyngeal or laryngeal mass lesion. No acute or suspicious bony findings.     Impression: Essentially normal CTA of the head and neck. Electronically Signed: Dayton Arthur MD  4/24/2024 2:59 PM EDT  Workstation ID: [id number]    CT Head Without Contrast Stroke Protocol    Result Date: 4/24/2024  CT HEAD WO CONTRAST STROKE PROTOCOL Date of Exam: 4/24/2024 2:22 PM EDT Indication: Neuro deficit, acute, stroke suspected Neuro deficit, acute stroke suspected. Comparison: None available. Technique: Axial CT images were obtained of the head without contrast administration.  Reconstructed coronal images were also obtained. Automated exposure control and iterative construction methods were used. Scan Time: 2:12 p.m. Results discussed with stroke team at 2:13 p.m. FINDINGS: Gray-white differentiation is maintained and there is no evidence of intracranial hemorrhage, mass or mass effect. Age-related changes of the brain are present including volume loss and typical periventricular sequela of chronic small vessel ischemia. There is otherwise no evidence of intracranial hemorrhage, mass or mass effect. The ventricles are normal in size and configuration accounting for surrounding volume loss. The orbits are normal and the paranasal sinuses are grossly clear.     Age-related changes of the brain as above, otherwise without evidence of acute intracranial abnormality. Electronically Signed: Thiago Hernandez MD  4/24/2024 2:51 PM EDT  Workstation ID: [id number]     Results for orders placed during the hospital encounter of 04/24/24    Adult Transthoracic Echo Complete W/ Cont if Necessary Per Protocol (With Agitated  Saline)    Interpretation Summary    Left ventricular ejection fraction appears to be 46 - 50%.    Left ventricular wall thickness is consistent with hypertrophy.    Mild aortic valve regurgitation is present.    Saline test results are negative.    Discharge Details        Discharge Medications        New Medications        Instructions Start Date   atorvastatin 40 MG tablet  Commonly known as: LIPITOR   40 mg, Oral, Nightly             Continue These Medications        Instructions Start Date   aspirin 325 MG tablet   325 mg, Oral, Daily      busPIRone 7.5 MG tablet  Commonly known as: BUSPAR   7.5 mg, Oral, 2 Times Daily      donepezil ODT 5 MG disintegrating tablet  Commonly known as: ARICEPT ODT   5 mg, Oral, Nightly      ferrous sulfate 325 (65 FE) MG tablet   325 mg, Oral, Daily With Breakfast      levothyroxine 75 MCG tablet  Commonly known as: SYNTHROID, LEVOTHROID   75 mcg, Oral, Daily      memantine 10 MG tablet  Commonly known as: NAMENDA   10 mg, Oral, 2 Times Daily      mirtazapine 7.5 MG tablet  Commonly known as: REMERON   7.5 mg, Oral, Nightly      omeprazole 20 MG capsule  Commonly known as: priLOSEC   20 mg, Oral, Every Morning Before Breakfast      senna 8.6 MG tablet  Commonly known as: SENOKOT   1 tablet, Oral, Daily      traZODone 50 MG tablet  Commonly known as: DESYREL   50 mg, Oral, Nightly      Vitamin D (Ergocalciferol) 50 MCG (2000 UT) capsule   1 capsule, Oral, Daily             Allergies   Allergen Reactions    Amoxicillin Unknown - Low Severity    Darvon [Propoxyphene] Unknown - Low Severity     Discharge Disposition:  Long Term Care (DC - External)    Diet:  Diet Instructions       Diet: Regular/House Diet; Pureed (NDD 1); Pudding Thick      Discharge Diet: Regular/House Diet    Texture: Pureed (NDD 1)    Fluid Consistency: Pudding Thick           Activity:  Activity Instructions       Activity as Tolerated      Up WIth Assist            CODE STATUS:    Code Status and Medical  Interventions:   Ordered at: 04/24/24 1749     Medical Intervention Limits:    NO intubation (DNI)     Code Status (Patient has no pulse and is not breathing):    No CPR (Do Not Attempt to Resuscitate)     Medical Interventions (Patient has pulse or is breathing):    Limited Support     No future appointments.    Diane Delgado PA-C  04/28/24    Time Spent on Discharge:  I spent 35 minutes on this discharge activity which included: face-to-face encounter with the patient, reviewing the data in the system, coordination of the care with the nursing staff as well as consultants, documentation, and entering orders.              Electronically signed by Diane Delgado PA-C at 04/28/24 8015

## 2024-04-28 NOTE — CASE MANAGEMENT/SOCIAL WORK
Case Management Discharge Note      Final Note: Ms. Damon is being discharged today, 4/28/24. She will be going to [facility] [address]. Nurse to call report to [phone] and ask for [address]. [facility] ambulance will transport at 1500 to [facility]. CM faxed the discharge summary to [phone].    Provided Post Acute Provider List?: Yes    Selected Continued Care - Admitted Since 4/24/2024       Destination    No services have been selected for the patient.                Durable Medical Equipment    No services have been selected for the patient.                Dialysis/Infusion    No services have been selected for the patient.                Home Medical Care    No services have been selected for the patient.                Therapy    No services have been selected for the patient.                Community Resources    No services have been selected for the patient.                Community & DME    No services have been selected for the patient.                         Final Discharge Disposition Code: 03 - skilled nursing facility (SNF)

## 2024-05-01 LAB
BACTERIA SPEC AEROBE CULT: NORMAL
BACTERIA SPEC AEROBE CULT: NORMAL

## 2025-02-17 ENCOUNTER — APPOINTMENT (OUTPATIENT)
Dept: GENERAL RADIOLOGY | Facility: HOSPITAL | Age: 75
End: 2025-02-17
Payer: MEDICARE

## 2025-02-17 ENCOUNTER — APPOINTMENT (OUTPATIENT)
Dept: MRI IMAGING | Facility: HOSPITAL | Age: 75
End: 2025-02-17
Payer: MEDICARE

## 2025-02-17 ENCOUNTER — APPOINTMENT (OUTPATIENT)
Dept: NEUROLOGY | Facility: HOSPITAL | Age: 75
End: 2025-02-17
Payer: MEDICARE

## 2025-02-17 ENCOUNTER — HOSPITAL ENCOUNTER (INPATIENT)
Facility: HOSPITAL | Age: 75
LOS: 3 days | Discharge: NURSING, REHAB OR LONG-TERM CARE | End: 2025-02-21
Attending: EMERGENCY MEDICINE | Admitting: INTERNAL MEDICINE
Payer: MEDICARE

## 2025-02-17 ENCOUNTER — APPOINTMENT (OUTPATIENT)
Dept: CT IMAGING | Facility: HOSPITAL | Age: 75
End: 2025-02-17
Payer: MEDICARE

## 2025-02-17 DIAGNOSIS — E83.42 HYPOMAGNESEMIA: ICD-10-CM

## 2025-02-17 DIAGNOSIS — Z86.73 HISTORY OF STROKE: ICD-10-CM

## 2025-02-17 DIAGNOSIS — R56.9 SEIZURE: ICD-10-CM

## 2025-02-17 DIAGNOSIS — N39.0 ACUTE UTI: Primary | ICD-10-CM

## 2025-02-17 DIAGNOSIS — R13.12 OROPHARYNGEAL DYSPHAGIA: ICD-10-CM

## 2025-02-17 PROBLEM — R79.89 ELEVATED TSH: Status: ACTIVE | Noted: 2025-02-17

## 2025-02-17 LAB
ALBUMIN SERPL-MCNC: 3.5 G/DL (ref 3.5–5.2)
ALBUMIN/GLOB SERPL: 1.4 G/DL
ALP SERPL-CCNC: 75 U/L (ref 39–117)
ALT SERPL W P-5'-P-CCNC: 8 U/L (ref 1–33)
AMPHET+METHAMPHET UR QL: NEGATIVE
AMPHETAMINES UR QL: NEGATIVE
ANION GAP SERPL CALCULATED.3IONS-SCNC: 14 MMOL/L (ref 5–15)
APAP SERPL-MCNC: <5 MCG/ML (ref 0–30)
AST SERPL-CCNC: 16 U/L (ref 1–32)
BACTERIA UR QL AUTO: ABNORMAL /HPF
BACTERIA UR QL AUTO: ABNORMAL /HPF
BARBITURATES UR QL SCN: NEGATIVE
BASOPHILS # BLD AUTO: 0.05 10*3/MM3 (ref 0–0.2)
BASOPHILS NFR BLD AUTO: 1.2 % (ref 0–1.5)
BENZODIAZ UR QL SCN: NEGATIVE
BILIRUB SERPL-MCNC: 0.6 MG/DL (ref 0–1.2)
BILIRUB UR QL STRIP: ABNORMAL
BILIRUB UR QL STRIP: NEGATIVE
BUN SERPL-MCNC: 22 MG/DL (ref 8–23)
BUN/CREAT SERPL: 40.7 (ref 7–25)
BUPRENORPHINE SERPL-MCNC: NEGATIVE NG/ML
CALCIUM SPEC-SCNC: 9 MG/DL (ref 8.6–10.5)
CANNABINOIDS SERPL QL: NEGATIVE
CHLORIDE SERPL-SCNC: 103 MMOL/L (ref 98–107)
CLARITY UR: ABNORMAL
CLARITY UR: CLEAR
CO2 SERPL-SCNC: 27 MMOL/L (ref 22–29)
COCAINE UR QL: NEGATIVE
COLOR UR: ABNORMAL
COLOR UR: ABNORMAL
CREAT SERPL-MCNC: 0.54 MG/DL (ref 0.57–1)
D-LACTATE SERPL-SCNC: 1.7 MMOL/L (ref 0.5–2)
D-LACTATE SERPL-SCNC: 2.3 MMOL/L (ref 0.5–2)
DEPRECATED RDW RBC AUTO: 47.1 FL (ref 37–54)
EGFRCR SERPLBLD CKD-EPI 2021: 95.6 ML/MIN/1.73
EOSINOPHIL # BLD AUTO: 0.07 10*3/MM3 (ref 0–0.4)
EOSINOPHIL NFR BLD AUTO: 1.7 % (ref 0.3–6.2)
ERYTHROCYTE [DISTWIDTH] IN BLOOD BY AUTOMATED COUNT: 14.2 % (ref 12.3–15.4)
ETHANOL BLD-MCNC: <10 MG/DL (ref 0–10)
FENTANYL UR-MCNC: NEGATIVE NG/ML
GEN 5 1HR TROPONIN T REFLEX: 23 NG/L
GLOBULIN UR ELPH-MCNC: 2.5 GM/DL
GLUCOSE SERPL-MCNC: 120 MG/DL (ref 65–99)
GLUCOSE UR STRIP-MCNC: NEGATIVE MG/DL
GLUCOSE UR STRIP-MCNC: NEGATIVE MG/DL
HCT VFR BLD AUTO: 42.9 % (ref 34–46.6)
HGB BLD-MCNC: 14.4 G/DL (ref 12–15.9)
HGB UR QL STRIP.AUTO: ABNORMAL
HGB UR QL STRIP.AUTO: ABNORMAL
HOLD SPECIMEN: NORMAL
HOLD SPECIMEN: NORMAL
HYALINE CASTS UR QL AUTO: ABNORMAL /LPF
HYALINE CASTS UR QL AUTO: ABNORMAL /LPF
IMM GRANULOCYTES # BLD AUTO: 0.17 10*3/MM3 (ref 0–0.05)
IMM GRANULOCYTES NFR BLD AUTO: 4.1 % (ref 0–0.5)
KETONES UR QL STRIP: ABNORMAL
KETONES UR QL STRIP: NEGATIVE
LEUKOCYTE ESTERASE UR QL STRIP.AUTO: ABNORMAL
LEUKOCYTE ESTERASE UR QL STRIP.AUTO: ABNORMAL
LYMPHOCYTES # BLD AUTO: 0.99 10*3/MM3 (ref 0.7–3.1)
LYMPHOCYTES NFR BLD AUTO: 23.9 % (ref 19.6–45.3)
MAGNESIUM SERPL-MCNC: 1.2 MG/DL (ref 1.6–2.4)
MCH RBC QN AUTO: 30.6 PG (ref 26.6–33)
MCHC RBC AUTO-ENTMCNC: 33.6 G/DL (ref 31.5–35.7)
MCV RBC AUTO: 91.1 FL (ref 79–97)
METHADONE UR QL SCN: NEGATIVE
MONOCYTES # BLD AUTO: 0.31 10*3/MM3 (ref 0.1–0.9)
MONOCYTES NFR BLD AUTO: 7.5 % (ref 5–12)
NEUTROPHILS NFR BLD AUTO: 2.55 10*3/MM3 (ref 1.7–7)
NEUTROPHILS NFR BLD AUTO: 61.6 % (ref 42.7–76)
NITRITE UR QL STRIP: NEGATIVE
NITRITE UR QL STRIP: NEGATIVE
NRBC BLD AUTO-RTO: 0 /100 WBC (ref 0–0.2)
OPIATES UR QL: NEGATIVE
OXYCODONE UR QL SCN: NEGATIVE
PCP UR QL SCN: NEGATIVE
PH UR STRIP.AUTO: 6 [PH] (ref 5–8)
PH UR STRIP.AUTO: 6 [PH] (ref 5–8)
PHOSPHATE SERPL-MCNC: 3.7 MG/DL (ref 2.5–4.5)
PLAT MORPH BLD: NORMAL
PLATELET # BLD AUTO: 118 10*3/MM3 (ref 140–450)
PMV BLD AUTO: 10.4 FL (ref 6–12)
POTASSIUM SERPL-SCNC: 3.9 MMOL/L (ref 3.5–5.2)
PROCALCITONIN SERPL-MCNC: 0.05 NG/ML (ref 0–0.25)
PROT SERPL-MCNC: 6 G/DL (ref 6–8.5)
PROT UR QL STRIP: ABNORMAL
PROT UR QL STRIP: ABNORMAL
QT INTERVAL: 408 MS
QTC INTERVAL: 473 MS
RBC # BLD AUTO: 4.71 10*6/MM3 (ref 3.77–5.28)
RBC # UR STRIP: ABNORMAL /HPF
RBC # UR STRIP: ABNORMAL /HPF
RBC MORPH BLD: NORMAL
REF LAB TEST METHOD: ABNORMAL
REF LAB TEST METHOD: ABNORMAL
RENAL EPI CELLS #/AREA URNS HPF: ABNORMAL /HPF
SALICYLATES SERPL-MCNC: 0.5 MG/DL
SODIUM SERPL-SCNC: 144 MMOL/L (ref 136–145)
SP GR UR STRIP: 1.02 (ref 1–1.03)
SP GR UR STRIP: 1.03 (ref 1–1.03)
SQUAMOUS #/AREA URNS HPF: ABNORMAL /HPF
SQUAMOUS #/AREA URNS HPF: ABNORMAL /HPF
T4 FREE SERPL-MCNC: 1.4 NG/DL (ref 0.92–1.68)
TRANS CELLS #/AREA URNS HPF: ABNORMAL /HPF
TRANS CELLS #/AREA URNS HPF: ABNORMAL /HPF
TRICYCLICS UR QL SCN: NEGATIVE
TROPONIN T % DELTA: 0
TROPONIN T NUMERIC DELTA: 0 NG/L
TROPONIN T SERPL HS-MCNC: 23 NG/L
TSH SERPL DL<=0.05 MIU/L-ACNC: 9.49 UIU/ML (ref 0.27–4.2)
UROBILINOGEN UR QL STRIP: ABNORMAL
UROBILINOGEN UR QL STRIP: ABNORMAL
WBC # UR STRIP: ABNORMAL /HPF
WBC # UR STRIP: ABNORMAL /HPF
WBC MORPH BLD: NORMAL
WBC NRBC COR # BLD AUTO: 4.14 10*3/MM3 (ref 3.4–10.8)
WHOLE BLOOD HOLD SPECIMEN: NORMAL
YEAST URNS QL MICRO: ABNORMAL /HPF

## 2025-02-17 PROCEDURE — 95819 EEG AWAKE AND ASLEEP: CPT | Performed by: PSYCHIATRY & NEUROLOGY

## 2025-02-17 PROCEDURE — 25010000002 LEVETRIRACETAM PER 10 MG: Performed by: EMERGENCY MEDICINE

## 2025-02-17 PROCEDURE — 70450 CT HEAD/BRAIN W/O DYE: CPT

## 2025-02-17 PROCEDURE — 87040 BLOOD CULTURE FOR BACTERIA: CPT | Performed by: EMERGENCY MEDICINE

## 2025-02-17 PROCEDURE — 83605 ASSAY OF LACTIC ACID: CPT | Performed by: EMERGENCY MEDICINE

## 2025-02-17 PROCEDURE — 70551 MRI BRAIN STEM W/O DYE: CPT

## 2025-02-17 PROCEDURE — 84484 ASSAY OF TROPONIN QUANT: CPT | Performed by: EMERGENCY MEDICINE

## 2025-02-17 PROCEDURE — G0378 HOSPITAL OBSERVATION PER HR: HCPCS

## 2025-02-17 PROCEDURE — 85007 BL SMEAR W/DIFF WBC COUNT: CPT | Performed by: EMERGENCY MEDICINE

## 2025-02-17 PROCEDURE — 93005 ELECTROCARDIOGRAM TRACING: CPT

## 2025-02-17 PROCEDURE — 87077 CULTURE AEROBIC IDENTIFY: CPT | Performed by: INTERNAL MEDICINE

## 2025-02-17 PROCEDURE — 84145 PROCALCITONIN (PCT): CPT | Performed by: EMERGENCY MEDICINE

## 2025-02-17 PROCEDURE — 81001 URINALYSIS AUTO W/SCOPE: CPT | Performed by: INTERNAL MEDICINE

## 2025-02-17 PROCEDURE — 85025 COMPLETE CBC W/AUTO DIFF WBC: CPT | Performed by: EMERGENCY MEDICINE

## 2025-02-17 PROCEDURE — 71045 X-RAY EXAM CHEST 1 VIEW: CPT

## 2025-02-17 PROCEDURE — 93005 ELECTROCARDIOGRAM TRACING: CPT | Performed by: EMERGENCY MEDICINE

## 2025-02-17 PROCEDURE — 80179 DRUG ASSAY SALICYLATE: CPT | Performed by: EMERGENCY MEDICINE

## 2025-02-17 PROCEDURE — 83735 ASSAY OF MAGNESIUM: CPT | Performed by: EMERGENCY MEDICINE

## 2025-02-17 PROCEDURE — 80143 DRUG ASSAY ACETAMINOPHEN: CPT | Performed by: EMERGENCY MEDICINE

## 2025-02-17 PROCEDURE — 80053 COMPREHEN METABOLIC PANEL: CPT | Performed by: EMERGENCY MEDICINE

## 2025-02-17 PROCEDURE — 25010000002 HEPARIN (PORCINE) PER 1000 UNITS: Performed by: INTERNAL MEDICINE

## 2025-02-17 PROCEDURE — P9612 CATHETERIZE FOR URINE SPEC: HCPCS

## 2025-02-17 PROCEDURE — 99285 EMERGENCY DEPT VISIT HI MDM: CPT

## 2025-02-17 PROCEDURE — 81001 URINALYSIS AUTO W/SCOPE: CPT | Performed by: EMERGENCY MEDICINE

## 2025-02-17 PROCEDURE — 36415 COLL VENOUS BLD VENIPUNCTURE: CPT

## 2025-02-17 PROCEDURE — 84443 ASSAY THYROID STIM HORMONE: CPT | Performed by: EMERGENCY MEDICINE

## 2025-02-17 PROCEDURE — 99222 1ST HOSP IP/OBS MODERATE 55: CPT

## 2025-02-17 PROCEDURE — 87086 URINE CULTURE/COLONY COUNT: CPT | Performed by: INTERNAL MEDICINE

## 2025-02-17 PROCEDURE — 84100 ASSAY OF PHOSPHORUS: CPT | Performed by: EMERGENCY MEDICINE

## 2025-02-17 PROCEDURE — 87086 URINE CULTURE/COLONY COUNT: CPT | Performed by: EMERGENCY MEDICINE

## 2025-02-17 PROCEDURE — 99223 1ST HOSP IP/OBS HIGH 75: CPT | Performed by: INTERNAL MEDICINE

## 2025-02-17 PROCEDURE — 25010000002 CEFTRIAXONE PER 250 MG: Performed by: EMERGENCY MEDICINE

## 2025-02-17 PROCEDURE — 80307 DRUG TEST PRSMV CHEM ANLYZR: CPT | Performed by: EMERGENCY MEDICINE

## 2025-02-17 PROCEDURE — 95819 EEG AWAKE AND ASLEEP: CPT

## 2025-02-17 PROCEDURE — 25010000002 MAGNESIUM SULFATE 2 GM/50ML SOLUTION: Performed by: INTERNAL MEDICINE

## 2025-02-17 PROCEDURE — 25810000003 SODIUM CHLORIDE 0.9 % SOLUTION: Performed by: INTERNAL MEDICINE

## 2025-02-17 PROCEDURE — 82077 ASSAY SPEC XCP UR&BREATH IA: CPT | Performed by: EMERGENCY MEDICINE

## 2025-02-17 PROCEDURE — 84439 ASSAY OF FREE THYROXINE: CPT | Performed by: EMERGENCY MEDICINE

## 2025-02-17 RX ORDER — BUSPIRONE HYDROCHLORIDE 15 MG/1
7.5 TABLET ORAL 2 TIMES DAILY
Status: DISCONTINUED | OUTPATIENT
Start: 2025-02-17 | End: 2025-02-21 | Stop reason: HOSPADM

## 2025-02-17 RX ORDER — BISACODYL 10 MG
10 SUPPOSITORY, RECTAL RECTAL DAILY PRN
Status: DISCONTINUED | OUTPATIENT
Start: 2025-02-17 | End: 2025-02-21 | Stop reason: HOSPADM

## 2025-02-17 RX ORDER — ONDANSETRON 4 MG/1
4 TABLET, ORALLY DISINTEGRATING ORAL EVERY 6 HOURS PRN
Status: DISCONTINUED | OUTPATIENT
Start: 2025-02-17 | End: 2025-02-21 | Stop reason: HOSPADM

## 2025-02-17 RX ORDER — LACTULOSE 10 G/15ML
20 SOLUTION ORAL DAILY PRN
COMMUNITY

## 2025-02-17 RX ORDER — DIVALPROEX SODIUM 125 MG/1
250 CAPSULE, COATED PELLETS ORAL EVERY 8 HOURS SCHEDULED
Status: DISCONTINUED | OUTPATIENT
Start: 2025-02-17 | End: 2025-02-20

## 2025-02-17 RX ORDER — HEPARIN SODIUM 5000 [USP'U]/ML
5000 INJECTION, SOLUTION INTRAVENOUS; SUBCUTANEOUS EVERY 8 HOURS SCHEDULED
Status: DISCONTINUED | OUTPATIENT
Start: 2025-02-17 | End: 2025-02-21 | Stop reason: HOSPADM

## 2025-02-17 RX ORDER — SODIUM CHLORIDE 9 MG/ML
40 INJECTION, SOLUTION INTRAVENOUS AS NEEDED
Status: DISCONTINUED | OUTPATIENT
Start: 2025-02-17 | End: 2025-02-21 | Stop reason: HOSPADM

## 2025-02-17 RX ORDER — BISACODYL 5 MG/1
5 TABLET, DELAYED RELEASE ORAL DAILY PRN
Status: DISCONTINUED | OUTPATIENT
Start: 2025-02-17 | End: 2025-02-21 | Stop reason: HOSPADM

## 2025-02-17 RX ORDER — LEVOTHYROXINE SODIUM 75 UG/1
75 TABLET ORAL DAILY
Status: DISCONTINUED | OUTPATIENT
Start: 2025-02-17 | End: 2025-02-21 | Stop reason: HOSPADM

## 2025-02-17 RX ORDER — MAGNESIUM SULFATE HEPTAHYDRATE 40 MG/ML
2 INJECTION, SOLUTION INTRAVENOUS
Status: DISPENSED | OUTPATIENT
Start: 2025-02-17 | End: 2025-02-17

## 2025-02-17 RX ORDER — AMOXICILLIN 250 MG
2 CAPSULE ORAL 2 TIMES DAILY PRN
Status: DISCONTINUED | OUTPATIENT
Start: 2025-02-17 | End: 2025-02-21 | Stop reason: HOSPADM

## 2025-02-17 RX ORDER — SODIUM CHLORIDE 9 MG/ML
100 INJECTION, SOLUTION INTRAVENOUS CONTINUOUS
Status: ACTIVE | OUTPATIENT
Start: 2025-02-17 | End: 2025-02-18

## 2025-02-17 RX ORDER — FERROUS SULFATE 325(65) MG
325 TABLET ORAL
Status: DISCONTINUED | OUTPATIENT
Start: 2025-02-18 | End: 2025-02-18

## 2025-02-17 RX ORDER — ACETAMINOPHEN 650 MG/1
650 SUPPOSITORY RECTAL EVERY 4 HOURS PRN
Status: DISCONTINUED | OUTPATIENT
Start: 2025-02-17 | End: 2025-02-21 | Stop reason: HOSPADM

## 2025-02-17 RX ORDER — MEMANTINE HYDROCHLORIDE 10 MG/1
10 TABLET ORAL 2 TIMES DAILY
Status: DISCONTINUED | OUTPATIENT
Start: 2025-02-17 | End: 2025-02-21 | Stop reason: HOSPADM

## 2025-02-17 RX ORDER — TRAZODONE HYDROCHLORIDE 50 MG/1
50 TABLET ORAL NIGHTLY
Status: DISCONTINUED | OUTPATIENT
Start: 2025-02-17 | End: 2025-02-21 | Stop reason: HOSPADM

## 2025-02-17 RX ORDER — DONEPEZIL HYDROCHLORIDE 5 MG/1
5 TABLET, FILM COATED ORAL NIGHTLY
Status: DISCONTINUED | OUTPATIENT
Start: 2025-02-17 | End: 2025-02-21 | Stop reason: HOSPADM

## 2025-02-17 RX ORDER — ACETAMINOPHEN 500 MG
500 TABLET ORAL EVERY 6 HOURS PRN
COMMUNITY

## 2025-02-17 RX ORDER — ONDANSETRON 2 MG/ML
4 INJECTION INTRAMUSCULAR; INTRAVENOUS EVERY 6 HOURS PRN
Status: DISCONTINUED | OUTPATIENT
Start: 2025-02-17 | End: 2025-02-21 | Stop reason: HOSPADM

## 2025-02-17 RX ORDER — LEVETIRACETAM 500 MG/5ML
1000 INJECTION, SOLUTION, CONCENTRATE INTRAVENOUS ONCE
Status: COMPLETED | OUTPATIENT
Start: 2025-02-17 | End: 2025-02-17

## 2025-02-17 RX ORDER — PANTOPRAZOLE SODIUM 40 MG/1
40 TABLET, DELAYED RELEASE ORAL
Status: DISCONTINUED | OUTPATIENT
Start: 2025-02-18 | End: 2025-02-18

## 2025-02-17 RX ORDER — SODIUM CHLORIDE 0.9 % (FLUSH) 0.9 %
10 SYRINGE (ML) INJECTION AS NEEDED
Status: DISCONTINUED | OUTPATIENT
Start: 2025-02-17 | End: 2025-02-21 | Stop reason: HOSPADM

## 2025-02-17 RX ORDER — MIRTAZAPINE 15 MG/1
7.5 TABLET, FILM COATED ORAL NIGHTLY
Status: DISCONTINUED | OUTPATIENT
Start: 2025-02-17 | End: 2025-02-21 | Stop reason: HOSPADM

## 2025-02-17 RX ORDER — ASPIRIN 325 MG
325 TABLET ORAL DAILY
Status: DISCONTINUED | OUTPATIENT
Start: 2025-02-17 | End: 2025-02-21 | Stop reason: HOSPADM

## 2025-02-17 RX ORDER — POLYETHYLENE GLYCOL 3350 17 G/17G
17 POWDER, FOR SOLUTION ORAL DAILY PRN
Status: DISCONTINUED | OUTPATIENT
Start: 2025-02-17 | End: 2025-02-21 | Stop reason: HOSPADM

## 2025-02-17 RX ORDER — MENTHOL AND ZINC OXIDE .44; 20.625 G/100G; G/100G
OINTMENT TOPICAL
COMMUNITY

## 2025-02-17 RX ORDER — DIVALPROEX SODIUM 125 MG/1
500 CAPSULE, COATED PELLETS ORAL ONCE
Status: COMPLETED | OUTPATIENT
Start: 2025-02-17 | End: 2025-02-17

## 2025-02-17 RX ORDER — ACETAMINOPHEN 160 MG/5ML
650 SOLUTION ORAL EVERY 4 HOURS PRN
Status: DISCONTINUED | OUTPATIENT
Start: 2025-02-17 | End: 2025-02-21 | Stop reason: HOSPADM

## 2025-02-17 RX ORDER — ATORVASTATIN CALCIUM 40 MG/1
40 TABLET, FILM COATED ORAL NIGHTLY
Status: DISCONTINUED | OUTPATIENT
Start: 2025-02-17 | End: 2025-02-21 | Stop reason: HOSPADM

## 2025-02-17 RX ORDER — ACETAMINOPHEN 325 MG/1
650 TABLET ORAL EVERY 4 HOURS PRN
Status: DISCONTINUED | OUTPATIENT
Start: 2025-02-17 | End: 2025-02-21 | Stop reason: HOSPADM

## 2025-02-17 RX ORDER — SODIUM CHLORIDE 0.9 % (FLUSH) 0.9 %
10 SYRINGE (ML) INJECTION EVERY 12 HOURS SCHEDULED
Status: DISCONTINUED | OUTPATIENT
Start: 2025-02-17 | End: 2025-02-21 | Stop reason: HOSPADM

## 2025-02-17 RX ORDER — SODIUM CHLORIDE 0.9 % (FLUSH) 0.9 %
1-10 SYRINGE (ML) INJECTION AS NEEDED
Status: DISCONTINUED | OUTPATIENT
Start: 2025-02-17 | End: 2025-02-21 | Stop reason: HOSPADM

## 2025-02-17 RX ADMIN — LEVETIRACETAM 1000 MG: 100 INJECTION, SOLUTION INTRAVENOUS at 08:48

## 2025-02-17 RX ADMIN — DIVALPROEX SODIUM 250 MG: 125 CAPSULE ORAL at 22:20

## 2025-02-17 RX ADMIN — SODIUM CHLORIDE 1000 MG: 900 INJECTION INTRAVENOUS at 08:49

## 2025-02-17 RX ADMIN — Medication 10 ML: at 15:02

## 2025-02-17 RX ADMIN — HEPARIN SODIUM 5000 UNITS: 5000 INJECTION INTRAVENOUS; SUBCUTANEOUS at 15:03

## 2025-02-17 RX ADMIN — DIVALPROEX SODIUM 500 MG: 125 CAPSULE ORAL at 12:45

## 2025-02-17 RX ADMIN — SODIUM CHLORIDE 100 ML/HR: 9 INJECTION, SOLUTION INTRAVENOUS at 15:02

## 2025-02-17 RX ADMIN — BUSPIRONE HYDROCHLORIDE 7.5 MG: 15 TABLET ORAL at 22:20

## 2025-02-17 RX ADMIN — HEPARIN SODIUM 5000 UNITS: 5000 INJECTION INTRAVENOUS; SUBCUTANEOUS at 22:20

## 2025-02-17 RX ADMIN — MAGNESIUM SULFATE HEPTAHYDRATE 2 G: 40 INJECTION, SOLUTION INTRAVENOUS at 12:41

## 2025-02-17 RX ADMIN — LEVOTHYROXINE SODIUM 75 MCG: 0.07 TABLET ORAL at 12:46

## 2025-02-17 RX ADMIN — Medication 10 ML: at 22:21

## 2025-02-17 RX ADMIN — MIRTAZAPINE 7.5 MG: 15 TABLET, FILM COATED ORAL at 22:20

## 2025-02-17 RX ADMIN — TRAZODONE HYDROCHLORIDE 50 MG: 50 TABLET ORAL at 22:20

## 2025-02-17 RX ADMIN — MEMANTINE HYDROCHLORIDE 10 MG: 10 TABLET, FILM COATED ORAL at 22:20

## 2025-02-17 RX ADMIN — ATORVASTATIN CALCIUM 40 MG: 40 TABLET, FILM COATED ORAL at 22:20

## 2025-02-17 RX ADMIN — MAGNESIUM SULFATE HEPTAHYDRATE 2 G: 40 INJECTION, SOLUTION INTRAVENOUS at 10:24

## 2025-02-17 RX ADMIN — DONEPEZIL HYDROCHLORIDE 5 MG: 5 TABLET ORAL at 22:20

## 2025-02-17 NOTE — CASE MANAGEMENT/SOCIAL WORK
Discharge Planning Assessment  [facility]     Patient Name: Rolanda Damon  MRN: [id number]  Today's Date: 2/17/2025    Admit Date: 2/17/2025    Plan: IDP   Discharge Needs Assessment       Row Name 02/17/25 0821       Living Environment    People in Home facility resident    Current Living Arrangements assisted living facility    Provides Primary Care For no one, unable/limited ability to care for self    Quality of Family Relationships involved    Able to Return to Prior Arrangements yes       Transition Planning    Patient/Family Anticipated Services at Transition     Transportation Anticipated health plan transportation       Discharge Needs Assessment    Readmission Within the Last 30 Days no previous admission in last 30 days    Concerns to be Addressed discharge planning                   Discharge Plan       Row Name 02/17/25 0822       Plan    Plan IDP    Plan Comments MSW met with Pt and Legal Guardian [place]a at bedside to complete IDP. Pt is resident at [facility]. Pt has Medicare, Medicaid, and [employer] insurance coverage. Pt dependent with ADLs; bedbound/chair bound. Pt has no O2. Pt usually requires EMS transport when medically ready. CM will continue to follow.                  Continued Care and Services - Admitted Since 2/17/2025    No active coordination exists for this encounter.          Demographic Summary       Row Name 02/17/25 0821       General Information    Arrived From home    Referral Source admission list;emergency department    Reason for Consult discharge planning    Preferred Language English                   Functional Status       Row Name 02/17/25 0821       Functional Status    Usual Activity Tolerance fair    Current Activity Tolerance fair       Functional Status, IADL    Medications completely dependent    Meal Preparation completely dependent    Housekeeping completely dependent    Laundry completely dependent    Shopping  completely dependent                               ALEXANDER Chowdhury

## 2025-02-17 NOTE — ED NOTES
Rolanda Damon    Nursing Report ED to Floor:  Mental status: pt has hx vascular dementia, nonverbal at baseline  Ambulatory status: non-ambulatory  Oxygen Therapy:  room air  Cardiac Rhythm: NSR  Admitted from: ED  Safety Concerns: seizure precautions  Precautions: n/a  Social Issues: n/a  ED Room #:  19    ED Nurse Phone Extension - [phone] or may call [phone].      HPI:   Chief Complaint   Patient presents with    Seizures       Past Medical History:  Past Medical History:   Diagnosis Date    Breast cancer     Dementia     TIA (transient ischemic attack)         Past Surgical History:  No past surgical history on file.     Admitting Doctor:   Anna Marie Alas MD    Consulting Provider(s):  Consults       Date and Time Order Name Status Description    2/17/2025 10:51 AM Inpatient Neurology Consult General Completed              Admitting Diagnosis:   Seizure    Most Recent Vitals:   Vitals:    02/17/25 0900 02/17/25 0930 02/17/25 1000 02/17/25 1100   BP: 128/82 125/71 119/69 115/64   Pulse: 61 59 59 58   Resp:       Temp:       TempSrc:       SpO2: 96% 96% 97% 96%   Weight:       Height:           Active LDAs/IV Access:   Lines, Drains & Airways       Active LDAs       Name Placement date Placement time Site Days    Peripheral IV 02/17/25 0840 Left;Posterior Hand 02/17/25  0840  Hand  less than 1                    Labs (abnormal labs have a star):       Meds Given in ED:   Medications   sodium chloride 0.9 % flush 10 mL (has no administration in time range)   Potassium Replacement - Follow Nurse / BPA Driven Protocol (has no administration in time range)   Magnesium Standard Dose Replacement - Follow Nurse / BPA Driven Protocol (has no administration in time range)   Phosphorus Replacement - Follow Nurse / BPA Driven Protocol (has no administration in time range)   Calcium Replacement - Follow Nurse / BPA Driven Protocol (has no administration in time range)   magnesium sulfate 2g/50 mL (PREMIX) infusion (2 g  Intravenous New Bag 2/17/25 1024)   aspirin tablet 325 mg (has no administration in time range)   atorvastatin (LIPITOR) tablet 40 mg (has no administration in time range)   busPIRone (BUSPAR) tablet 7.5 mg (has no administration in time range)   donepezil (ARICEPT) tablet 5 mg (has no administration in time range)   ferrous sulfate tablet 325 mg (has no administration in time range)   levothyroxine (SYNTHROID, LEVOTHROID) tablet 75 mcg (has no administration in time range)   memantine (NAMENDA) tablet 10 mg (has no administration in time range)   mirtazapine (REMERON) tablet 7.5 mg (has no administration in time range)   pantoprazole (PROTONIX) EC tablet 40 mg (has no administration in time range)   traZODone (DESYREL) tablet 50 mg (has no administration in time range)   sodium chloride 0.9 % flush 10 mL (has no administration in time range)   sodium chloride 0.9 % flush 1-10 mL (has no administration in time range)   sodium chloride 0.9 % infusion 40 mL (has no administration in time range)   heparin (porcine) 5000 UNIT/ML injection 5,000 Units (has no administration in time range)   Potassium Replacement - Follow Nurse / BPA Driven Protocol (has no administration in time range)   Magnesium Standard Dose Replacement - Follow Nurse / BPA Driven Protocol (has no administration in time range)   Phosphorus Replacement - Follow Nurse / BPA Driven Protocol (has no administration in time range)   Calcium Replacement - Follow Nurse / BPA Driven Protocol (has no administration in time range)   acetaminophen (TYLENOL) tablet 650 mg (has no administration in time range)     Or   acetaminophen (TYLENOL) 160 MG/5ML oral solution 650 mg (has no administration in time range)     Or   acetaminophen (TYLENOL) suppository 650 mg (has no administration in time range)   sennosides-docusate (PERICOLACE) 8.6-50 MG per tablet 2 tablet (has no administration in time range)     And   polyethylene glycol (MIRALAX) packet 17 g (has no  administration in time range)     And   bisacodyl (DULCOLAX) EC tablet 5 mg (has no administration in time range)     And   bisacodyl (DULCOLAX) suppository 10 mg (has no administration in time range)   ondansetron ODT (ZOFRAN-ODT) disintegrating tablet 4 mg (has no administration in time range)     Or   ondansetron (ZOFRAN) injection 4 mg (has no administration in time range)   cefTRIAXone (ROCEPHIN) 1,000 mg in sodium chloride 0.9 % 100 mL MBP (has no administration in time range)   sodium chloride 0.9 % infusion (has no administration in time range)   valproate (DEPACON) 500 mg in sodium chloride 0.9 % 50 mL IVPB (has no administration in time range)   valproate (DEPACON) 250 mg in sodium chloride 0.9 % 50 mL IVPB (has no administration in time range)   cefTRIAXone (ROCEPHIN) 1,000 mg in sodium chloride 0.9 % 100 mL MBP (0 mg Intravenous Stopped 2/17/25 0929)   levETIRAcetam (KEPPRA) injection 1,000 mg (1,000 mg Intravenous Given 2/17/25 0848)     sodium chloride, 100 mL/hr         Last NIH score:                                                          Dysphagia screening results:  Patient Factors Component (Dysphagia:Stroke or Rule-out)  Best Eye Response: 3-->(E3) to speech (02/17/25 0632)  Best Motor Response: 5-->(M5) localizes pain (02/17/25 0632)  Best Verbal Response: 1-->(V1) none (02/17/25 0632)  Roma Coma Scale Score: 9 (02/17/25 0632)     Roma Coma Scale:  No data recorded     CIWA:        Restraint Type:            Isolation Status:  No active isolations

## 2025-02-17 NOTE — CONSULTS
"[facility] Neurology  Consult Note    Patient Name: Rolanda Damon  : 1950  MRN: [id number]  Primary Care Physician:  Provider, No Known  Referring Physician: No ref. provider found  Date of admission: 2025    Subjective     Reason for Consult/ Chief Complaint: Seizure like activity     Rolanda Damon is a 74 y.o. female with past medical history of dementia without behavior disturbances, CVA, seizures?  And TIA who presented to [facility] after witnessed seizure-like activity at nursing facility.  Per daughter-in-law which is bedside nursing facility reported approximately 5 minutes of \"grand mall seizure\".  There is no tongue bite noted on exam.  Unclear patient loss control bowels or bladder.  Patient was given loading dose of Keppra in the ED.      Per daughter-in-law patient is hospice at her facility.  She states that similar events played out in April of last year when patient had a stroke and a UTI.  At that time she was started on Keppra with concern of seizure activity.  Per daughter-in-law it made the patient extremely lethargic.  It was decided to wean patient off and not restart any antiepileptic.      CT head without acute abnormality.  Generalized atrophy and chronic microvascular ischemic changes noted.  Old infarct noted in left corona radiata.  UA with +4 bacteria, negative nitrites.  Glucose 120.  Electrolytes unremarkable with exception of magnesium which was 1.2.  Lactate 2.3.  WBCs 4.14.  UDS negative.  Blood and urine cultures pending.    Review Of Systems   Unable to assess due to mentation    Personal History     Past Medical History:   Diagnosis Date    Breast cancer     Dementia     TIA (transient ischemic attack)        No past surgical history on file.    Family History: family history includes Alzheimer's disease in her father; Hypertension in her father; Stroke in her father. Otherwise pertinent FHx was reviewed and not pertinent to current issue.    Social History:  reports " that she has never smoked. She has never been exposed to tobacco smoke. She does not have any smokeless tobacco history on file. She reports that she does not drink alcohol and does not use drugs.    Home Medications:   Vitamin D (Ergocalciferol), aspirin, atorvastatin, busPIRone, donepezil ODT, ferrous sulfate, levothyroxine, memantine, mirtazapine, omeprazole, senna, and traZODone    Current Medications:     Current Facility-Administered Medications:     Calcium Replacement - Follow Nurse / BPA Driven Protocol, , Not Applicable, PRN, Delta Mcneill MD    cefTRIAXone (ROCEPHIN) 1,000 mg in sodium chloride 0.9 % 100 mL MBP, 1,000 mg, Intravenous, Once, Delta Mcneill MD    levETIRAcetam (KEPPRA) injection 1,000 mg, 1,000 mg, Intravenous, Once, Delta Mcneill MD    Magnesium Standard Dose Replacement - Follow Nurse / BPA Driven Protocol, , Not Applicable, PROsito DURAN David Michael, MD    magnesium sulfate 2g/50 mL (PREMIX) infusion, 2 g, Intravenous, Q2H, Anna Marie Alas MD    Phosphorus Replacement - Follow Nurse / BPA Driven Protocol, , Not Applicable, PROsito DURAN David Michael, MD    Potassium Replacement - Follow Nurse / BPA Driven Protocol, , Not Applicable, Osito NOWAK David Michael, MD    sodium chloride 0.9 % flush 10 mL, 10 mL, Intravenous, PRN, Delta Mcneill MD    Current Outpatient Medications:     aspirin 325 MG tablet, Take 1 tablet by mouth Daily., Disp: , Rfl:     atorvastatin (LIPITOR) 40 MG tablet, Take 1 tablet by mouth Every Night., Disp: , Rfl:     busPIRone (BUSPAR) 7.5 MG tablet, Take 1 tablet by mouth 2 (Two) Times a Day., Disp: , Rfl:     donepezil ODT (ARICEPT ODT) 5 MG disintegrating tablet, Take 1 tablet by mouth Every Night., Disp: , Rfl:     ferrous sulfate 325 (65 FE) MG tablet, Take 1 tablet by mouth Daily With Breakfast., Disp: , Rfl:     levothyroxine (SYNTHROID, LEVOTHROID) 75 MCG tablet, Take 1 tablet by mouth Daily., Disp: , Rfl:     memantine (NAMENDA)  10 MG tablet, Take 1 tablet by mouth 2 (Two) Times a Day., Disp: 60 tablet, Rfl: 6    mirtazapine (REMERON) 7.5 MG tablet, Take 1 tablet by mouth Every Night., Disp: , Rfl:     omeprazole (priLOSEC) 20 MG capsule, Take 1 capsule by mouth Every Morning Before Breakfast., Disp: , Rfl:     senna 8.6 MG tablet, Take 1 tablet by mouth Daily., Disp: , Rfl:     traZODone (DESYREL) 50 MG tablet, Take 1 tablet by mouth Every Night., Disp: , Rfl:     Vitamin D, Ergocalciferol, 50 MCG (2000 UT) capsule, Take 1 capsule by mouth Daily., Disp: , Rfl:      Allergies:  Allergies   Allergen Reactions    Amoxicillin Unknown - Low Severity    Darvon [Propoxyphene] Unknown - Low Severity       Objective     Physical Exam  Vitals and nursing note reviewed.   Constitutional:       General: She is not in acute distress.     Appearance: She is ill-appearing.   Eyes:      Pupils: Pupils are equal, round, and reactive to light.      Comments:  no nystagmus, responded to visual threat   Cardiovascular:      Rate and Rhythm: Normal rate.   Pulmonary:      Effort: Pulmonary effort is normal.   Neurological:      Mental Status: She is alert.      Cranial Nerves: No facial asymmetry.      Motor: Weakness present. No tremor or seizure activity.      Deep Tendon Reflexes:      Reflex Scores:       Bicep reflexes are 1+ on the right side and 1+ on the left side.       Patellar reflexes are 1+ on the right side and 0 on the left side.     Comments:   Physical exam limited to patient baseline    Unequivocal Babinski's    Moved all extremities to noxious stimuli             Vitals:  Temp:  [96.4 °F (35.8 °C)] 96.4 °F (35.8 °C)  Heart Rate:  [52-78] 77  Resp:  [17] 17  BP: (110-142)/(62-84) 142/84    Laboratory Results:   Lab Results   Component Value Date    GLUCOSE 120 (H) 02/17/2025    CALCIUM 9.0 02/17/2025     02/17/2025    K 3.9 02/17/2025    CO2 27.0 02/17/2025     02/17/2025    BUN 22 02/17/2025    CREATININE 0.54 (L) 02/17/2025     "BCR 40.7 (H) 02/17/2025    ANIONGAP 14.0 02/17/2025     Lab Results   Component Value Date    WBC 4.14 02/17/2025    HGB 14.4 02/17/2025    HCT 42.9 02/17/2025    MCV 91.1 02/17/2025     (L) 02/17/2025     Lab Results   Component Value Date    CHOL 167 04/25/2024     Lab Results   Component Value Date    HDL 39 (L) 04/25/2024     Lab Results   Component Value Date     (H) 04/25/2024     Lab Results   Component Value Date    TRIG 86 04/25/2024     Lab Results   Component Value Date    HGBA1C 6.70 (H) 04/25/2024     Lab Results   Component Value Date    INR 1.07 04/27/2023    PROTIME 11.6 04/27/2023     No results found for: \"[id number]\"  No results found for: \"FOLATE\"          Assessment / Plan   Active Hospital Problems:    Seizure       Brief Patient Summary:  Rolanda Damon is a 74 y.o. female with past medical history of dementia without behavior disturbances, CVA, seizures?  And TIA who presented to [facility] after witnessed seizure-like activity at nursing facility.     Plan:   Seizure-like activity  Previous CVA  Dementia without behavioral disturbances  UTI  Patient likely experienced seizure in the setting of acute infection.  Patient previously not tolerated Keppra.  Transition to Depakote.  One-time loading dose 1000 mg IV followed by 250 mg 3 times daily.   VPA level in a.m.  EEG  MRI brain without contrast  Continue seizure precautions  Continue delirium/fall precautions  Antibiotics per hospitalist team  N.p.o. till bedside swallow is complete.  General Neurology will continue to follow    I have discussed the above with the patient, bedside RN Dr. Alas and Dr. Mcneill  Time spent with patient: 80 minutes in face-to-face evaluation and management of the patient.       Madeleine Marquez, REJI         "

## 2025-02-17 NOTE — LETTER
EMS Transport Request  For use at [facility], [place], [place], [place], and [place] only   Patient Name: Rolanda Damon : 1950   Weight:54.4 kg (119 lb 14.9 oz) Pick-up Location: [facility] BLS/ALS: BLS/ALS: BLS   Insurance: MEDICARE Auth End Date: 25   Pre-Cert #: D/C Summary complete:    Destination: Other [facility] [address], [place] [address]   Contact Precautions: None   Equipment (O2, Fluids, etc.): O2, settings 2L   Arrive By Date/Time: 25 afternoon Stretcher/WC: Stretcher   CM Requesting: Brielle Portillo Ext: [phone]   Notes/Medical Necessity: hospice patient, AMS     ______________________________________________________________________    *Only 2 patient bags OR 1 carry-on size bag are permitted.  Wheelchairs and walkers CANNOT transported with the patient. Acknowledge: Yes

## 2025-02-17 NOTE — H&P
[facility] Medicine Services  HISTORY AND PHYSICAL    Patient Name: Rolanda Damon  : 1950  MRN: [id number]  Primary Care Physician: Soumya, No Known  Date of admission: 2025      Subjective   Subjective     Chief Complaint:  seizure    HPI:  Rolanda Damon is a 74 y.o. female with PMH significant for HTN, HLD, diet-controlled DMII,  hypothyroidism (no longer on medication with baseline elevated TSH), breast cancer s/p bilateral mastectomy, CVA, seizure disorder and dementia who presented from her nursing facility with seizure.  Per patient's daughter in law at bedside, pt is essentially minimally verbal at baseline due to her dementia which worsened after her CVA last year.  Per daughter in law, pt has been doing okay recently and no issues with fevers or any other systemic symptoms. Pt has not been on Keppra in three years due to oversedation.        Personal History     Past Medical History:   Diagnosis Date    Breast cancer     Dementia     TIA (transient ischemic attack)            No past surgical history on file.    Family History: family history includes Alzheimer's disease in her father; Hypertension in her father; Stroke in her father.     Social History:  reports that she has never smoked. She has never been exposed to tobacco smoke. She does not have any smokeless tobacco history on file. She reports that she does not drink alcohol and does not use drugs.  Social History     Social History Narrative    Not on file       Medications:  Available home medication information reviewed.  Vitamin D (Ergocalciferol), aspirin, atorvastatin, busPIRone, donepezil ODT, ferrous sulfate, levothyroxine, memantine, mirtazapine, omeprazole, senna, and traZODone    Allergies   Allergen Reactions    Amoxicillin Unknown - Low Severity    Darvon [Propoxyphene] Unknown - Low Severity       Objective   Objective     Vital Signs:   Temp:  [96.4 °F (35.8 °C)] 96.4 °F (35.8 °C)  Heart Rate:   [52-78] 71  Resp:  [17] 17  BP: (110-144)/(62-86) 144/86       Physical Exam   Constitutional: Awake, minimally interactive (baseline)  Eyes: PERRLA, sclerae anicteric, no conjunctival injection  HENT: NCAT, mucous membranes dry,  edentulous   Neck: Supple, no thyromegaly, no lymphadenopathy, trachea midline  Respiratory: Clear to auscultation bilaterally, nonlabored respirations   Cardiovascular: RRR, no murmurs, rubs, or gallops, palpable pedal pulses bilaterally  Gastrointestinal: Positive bowel sounds, soft, nontender, nondistended  Musculoskeletal: No bilateral ankle edema, no clubbing or cyanosis to extremities  Neurologic: FARIHA fully as pt is nonverbal at baseline and does not respond to commands at baseline   Skin: No rashes     Result Review:  I have personally reviewed the results from the time of this admission to 2/17/2025 08:59 EST and agree with these findings:  [x]  Laboratory list / accordion  [x]  Microbiology  [x]  Radiology  []  EKG/Telemetry   []  Cardiology/Vascular   []  Pathology  [x]  Old records  []  Other:  Most notable findings include: see assessment and plan       LAB RESULTS:      Lab 02/17/25  0630   WBC 4.14   HEMOGLOBIN 14.4   HEMATOCRIT 42.9   PLATELETS 118*   NEUTROS ABS 2.55   IMMATURE GRANS (ABS) 0.17*   LYMPHS ABS 0.99   MONOS ABS 0.31   EOS ABS 0.07   MCV 91.1   PROCALCITONIN 0.05   LACTATE 2.3*         Lab 02/17/25  0630   SODIUM 144   POTASSIUM 3.9   CHLORIDE 103   CO2 27.0   ANION GAP 14.0   BUN 22   CREATININE 0.54*   EGFR 95.6   GLUCOSE 120*   CALCIUM 9.0   MAGNESIUM 1.2*   PHOSPHORUS 3.7   TSH 9.490*         Lab 02/17/25  0630   TOTAL PROTEIN 6.0   ALBUMIN 3.5   GLOBULIN 2.5   ALT (SGPT) 8   AST (SGOT) 16   BILIRUBIN 0.6   ALK PHOS 75         Lab 02/17/25  0804 02/17/25  0630   HSTROP T 23* 23*                 UA          4/26/2024    08:47 2/17/2025    06:11   Urinalysis   Squamous Epithelial Cells, UA 7-12  13-20    Specific Gravity, UA 1.038  1.030    Ketones, UA 15  mg/dL (1+)  Negative    Blood, UA Negative  Large (3+)    Leukocytes, UA Trace  Small (1+)    Nitrite, UA Negative  Negative    RBC, UA 6-10  Unable to determine due to loaded field    WBC, UA 6-10  Too Numerous to Count    Bacteria, UA 4+  4+        Microbiology Results (last 10 days)       ** No results found for the last 240 hours. **            CT Head Without Contrast    Result Date: 2/17/2025  CT HEAD WO CONTRAST Date of Exam: 2/17/2025 6:47 AM EST Indication: sz. Comparison: CT scan of the head 4/24/2024 Technique: Axial CT images were obtained of the head without contrast administration.  Automated exposure control and iterative construction methods were used. Findings: There is mild diffuse generalized atrophy. There are low-attenuation areas in the periventricular white matter consistent with chronic microvascular ischemic change. There is an old lacunar infarct in the left periventricular corona radiata. There is no mass, mass effect or midline shift. There are no abnormal extra-axial fluid collections or areas of acute hemorrhage. The paranasal sinuses are clear. The mastoid air cells are clear.     Impression: Impression: Atrophy and chronic microvascular ischemic change. No acute intracranial process. Electronically Signed: Brandon Arevalo MD  2/17/2025 6:53 AM EST  Workstation ID: [id number]    XR Chest 1 View    Result Date: 2/17/2025  XR CHEST 1 VW Date of Exam: 2/17/2025 5:50 AM EST Indication: Weak/Dizzy/AMS triage protocol Comparison: Chest radiograph April 24, 2024 Findings: The heart size and pulmonary vascular markings are normal. There is tortuosity of the thoracic aorta. There is elevation of left hemidiaphragm. The lungs are clear.     Impression: Impression: No active disease. Electronically Signed: Brandon Arevalo MD  2/17/2025 6:26 AM EST  Workstation ID: [id number]     Results for orders placed during the hospital encounter of 04/24/24    Adult Transthoracic Echo Complete W/ Cont if Necessary Per  Protocol (With Agitated Saline)    Interpretation Summary    Left ventricular ejection fraction appears to be 46 - 50%.    Left ventricular wall thickness is consistent with hypertrophy.    Mild aortic valve regurgitation is present.    Saline test results are negative.      Assessment & Plan   Assessment & Plan       Seizure    Acute UTI (urinary tract infection)    Elevated TSH    Dementia without behavioral disturbance    Type 2 diabetes mellitus    History of breast cancer    Thrombocytopenia    Cerebrovascular accident (CVA) due to bilateral embolism of middle cerebral arteries    Other hyperlipidemia    Ms. Damon is a 73 yo WF resident of [facility] with PMH significant for HTN, HLD, diet-controlled DMII, prior TIA, hypothyroidism , breast cancer s/p bilateral mastectomy and dementia who presented with seizures.    Plan:    Seizure  H/o seizure disorder  -- no longer on Keppra due to oversedation per report  -- given IV Keppra in the ED  -- Neurology currently at bedside, further plans as per their instructions    Acute UTI  -- previous history of E coli UTI, awaiting Ucx  -- continue IV Rocephin for now, will do 7 days for complicated UTI    Hypothyroidism  -- TSH elevated, will increase home dose Synthroid. Recommend repeat TSH in 4-6 weeks    Dementia  -- continue home meds     T2DM  HTN  -- not on medications for these issues     Total time spent: 75 minutes  Time spent includes time reviewing chart, face-to-face time, counseling patient/family/caregiver, ordering medications/tests/procedures, communicating with other health care professionals, documenting clinical information in the electronic health record, and coordination of care.       VTE Prophylaxis:  Pharmacologic VTE prophylaxis orders are signed & held.            CODE STATUS:    Code Status and Medical Interventions: No CPR (Do Not Attempt to Resuscitate); Limited Support; No intubation (DNI), No cardioversion, No dialysis, No  vasopressors   Ordered at: 02/17/25 0843     Medical Intervention Limits:    No intubation (DNI)    No cardioversion    No dialysis    No vasopressors     Level Of Support Discussed With:    Health Care Surrogate     Code Status (Patient has no pulse and is not breathing):    No CPR (Do Not Attempt to Resuscitate)     Medical Interventions (Patient has pulse or is breathing):    Limited Support       Expected Discharge   Expected Discharge Date: 2/19/2025; Expected Discharge Time:      Anna Marie Alas MD  02/17/25

## 2025-02-17 NOTE — ED PROVIDER NOTES
"Subjective   History of Present Illness  74-year-old female presents for evaluation of \"possible seizure.\"  Of note, the patient is unable to provide me with any history at this time.  She has a history of dementia and a prior stroke.  EMS reports that she has a prior history of seizures; however, I see no record of seizures in her past medical history and she is not on any antiepileptic drugs.  This morning, she reportedly had about 5 minutes of seizure activity that was witnessed by staff at her facility.  Her seizure activity resolved spontaneously.  She did not bite her tongue.  No urinary incontinence.  She was noted to be somnolent so EMS was called.  She was not hypoglycemic.  She was then brought to our facility for further evaluation and treatment.  Of note, prior to evaluating the patient I did a thorough review of her records.  Her last hospitalization at our facility was in April 2024 which time she was hospitalized for a stroke and UTI.      Review of Systems   Unable to perform ROS: Dementia   Neurological:  Positive for seizures.       Past Medical History:   Diagnosis Date    Breast cancer     Dementia     TIA (transient ischemic attack)        Allergies   Allergen Reactions    Amoxicillin Unknown - Low Severity    Darvon [Propoxyphene] Unknown - Low Severity       No past surgical history on file.    Family History   Problem Relation Age of Onset    Alzheimer's disease Father     Hypertension Father     Stroke Father        Social History     Socioeconomic History    Marital status:    Tobacco Use    Smoking status: Never     Passive exposure: Never   Vaping Use    Vaping status: Never Used   Substance and Sexual Activity    Alcohol use: No    Drug use: Never    Sexual activity: Not Currently           Objective   Physical Exam  Vitals and nursing note reviewed.   Constitutional:       Appearance: She is well-developed. She is not diaphoretic.      Comments: Chronically ill-appearing elderly " "female   HENT:      Head: Normocephalic and atraumatic.      Comments: No tongue laceration noted  Eyes:      Pupils: Pupils are equal, round, and reactive to light.   Cardiovascular:      Rate and Rhythm: Normal rate and regular rhythm.      Heart sounds: Normal heart sounds. No murmur heard.     No friction rub. No gallop.   Pulmonary:      Effort: Pulmonary effort is normal. No respiratory distress.      Breath sounds: Normal breath sounds. No wheezing or rales.   Abdominal:      General: Bowel sounds are normal. There is no distension.      Palpations: Abdomen is soft. There is no mass.      Tenderness: There is no abdominal tenderness. There is no guarding or rebound.   Musculoskeletal:      Cervical back: Neck supple.      Comments: Chronic appearing contractures noted of both upper extremities   Skin:     General: Skin is warm and dry.      Findings: No erythema or rash.   Neurological:      Comments: Somnolent but arousable, unable to follow simple commands   Psychiatric:      Comments: Unable to adequately evaluate         Procedures           ED Course  ED Course as of 02/17/25 1515   Mon Feb 17, 2025   0607 74-year-old female presents for evaluation of \"possible seizure.\"  Of note, the patient is unable to provide me with any history at this time.  She has a history of dementia and prior stroke.  EMS reports that she has a prior history of seizures, but I see no record of seizures and her past medical history, and she is not on any antiepileptic drugs.  This morning, she reportedly had about 5 minutes of seizure activity per staff at her facility.  Her seizure activity resolved spontaneously.  She did not bite her tongue.  No urinary incontinence.  EMS was called and she was brought to our facility to be evaluated.  On arrival, the patient is chronically ill-appearing.  She has contractures of both upper extremities.  She is unable to follow commands and appears somewhat somnolent.  Differential diagnosis " is quite broad.  We will obtain labs and imaging, and we will reassess following initial interventions. [DD]   0639 I personally and independently viewed the patient's x-ray images myself, and I am in agreement with the radiologist's reading for final interpretation, particularly there is no pneumonia noted. [DD]   0656 I personally and independently reviewed the patient's CT images and findings, and I am in agreement with the radiologist regarding CT interpretation--particularly there is no intracranial bleed noted. [DD]   0723 Labs remarkable for hypomagnesemia.  Electrolyte replacement protocol initiated. [DD]   0747 Urinalysis is suggestive of infection.  Blood and urine cultures obtained.  Rocephin given. [DD]   0748 Patient loaded with IV Keppra. [DD]   0748 I spoke at length with the patient's daughter-in-law at bedside regarding goals of care.  We were in agreement that admission to the hospital was warranted at this point.  I discussed the patient's case with our hospitalist, Dr. Alas, and the patient will be admitted under her care for further evaluation and treatment.  The patient is hemodynamically stable at this time and is aware/agreeable with the plan. [DD]   0839 Additionally, I discussed the patient's case with Madeleine Marquez of neurology who is going to see the patient in consult as well. [DD]      ED Course User Index  [DD] Delta Mcneill MD                                           Recent Results (from the past 24 hours)   ECG 12 Lead ED Triage Standing Order; Weak / Dizzy / AMS    Collection Time: 02/17/25  5:58 AM   Result Value Ref Range    QT Interval 408 ms    QTC Interval 473 ms   Urinalysis With Culture If Indicated - Straight Cath    Collection Time: 02/17/25  6:11 AM    Specimen: Straight Cath; Urine   Result Value Ref Range    Color, UA Dark Yellow (A) Yellow, Straw    Appearance, UA Turbid (A) Clear    pH, UA 6.0 5.0 - 8.0    Specific Gravity, UA 1.030 1.001 - 1.030    Glucose,  UA Negative Negative    Ketones, UA Negative Negative    Bilirubin, UA Small (1+) (A) Negative    Blood, UA Large (3+) (A) Negative    Protein, UA >=300 mg/dL (3+) (A) Negative    Leuk Esterase, UA Small (1+) (A) Negative    Nitrite, UA Negative Negative    Urobilinogen, UA 2.0 E.U./dL (A) 0.2 - 1.0 E.U./dL   Urine Drug Screen - Straight Cath    Collection Time: 02/17/25  6:11 AM    Specimen: Straight Cath; Urine   Result Value Ref Range    THC, Screen, Urine Negative Negative    Phencyclidine (PCP), Urine Negative Negative    Cocaine Screen, Urine Negative Negative    Methamphetamine, Ur Negative Negative    Opiate Screen Negative Negative    Amphetamine Screen, Urine Negative Negative    Benzodiazepine Screen, Urine Negative Negative    Tricyclic Antidepressants Screen Negative Negative    Methadone Screen, Urine Negative Negative    Barbiturates Screen, Urine Negative Negative    Oxycodone Screen, Urine Negative Negative    Buprenorphine, Screen, Urine Negative Negative   Urinalysis, Microscopic Only - Straight Cath    Collection Time: 02/17/25  6:11 AM    Specimen: Straight Cath; Urine   Result Value Ref Range    RBC, UA Unable to determine due to loaded field (A) None Seen, 0-2 /HPF    WBC, UA Too Numerous to Count (A) None Seen, 0-2 /HPF    Bacteria, UA 4+ (A) None Seen, Trace /HPF    Squamous Epithelial Cells, UA 13-20 (A) None Seen, 0-2 /HPF    Transitional Epithelial Cells, UA 21-30 (A) 0 - 2 /HPF    Renal Epithelial Cells, UA 0-2 0 - 2 /HPF    Yeast, UA Large/3+ Yeast (A) None Seen /HPF    Hyaline Casts, UA Unable to determine due to loaded field 0 - 6 /LPF    Methodology Manual Light Microscopy    Fentanyl, Urine - Straight Cath    Collection Time: 02/17/25  6:11 AM    Specimen: Straight Cath; Urine   Result Value Ref Range    Fentanyl, Urine Negative Negative   Comprehensive Metabolic Panel    Collection Time: 02/17/25  6:30 AM    Specimen: Blood   Result Value Ref Range    Glucose 120 (H) 65 - 99 mg/dL     BUN 22 8 - 23 mg/dL    Creatinine 0.54 (L) 0.57 - 1.00 mg/dL    Sodium 144 136 - 145 mmol/L    Potassium 3.9 3.5 - 5.2 mmol/L    Chloride 103 98 - 107 mmol/L    CO2 27.0 22.0 - 29.0 mmol/L    Calcium 9.0 8.6 - 10.5 mg/dL    Total Protein 6.0 6.0 - 8.5 g/dL    Albumin 3.5 3.5 - 5.2 g/dL    ALT (SGPT) 8 1 - 33 U/L    AST (SGOT) 16 1 - 32 U/L    Alkaline Phosphatase 75 39 - 117 U/L    Total Bilirubin 0.6 0.0 - 1.2 mg/dL    Globulin 2.5 gm/dL    A/G Ratio 1.4 g/dL    BUN/Creatinine Ratio 40.7 (H) 7.0 - 25.0    Anion Gap 14.0 5.0 - 15.0 mmol/L    eGFR 95.6 >60.0 mL/min/1.73   High Sensitivity Troponin T    Collection Time: 02/17/25  6:30 AM    Specimen: Blood   Result Value Ref Range    HS Troponin T 23 (H) <14 ng/L   Magnesium    Collection Time: 02/17/25  6:30 AM    Specimen: Blood   Result Value Ref Range    Magnesium 1.2 (L) 1.6 - 2.4 mg/dL   Green Top (Gel)    Collection Time: 02/17/25  6:30 AM   Result Value Ref Range    Extra Tube Hold for add-ons.    Lavender Top    Collection Time: 02/17/25  6:30 AM   Result Value Ref Range    Extra Tube hold for add-on    Gray Top    Collection Time: 02/17/25  6:30 AM   Result Value Ref Range    Extra Tube Hold for add-ons.    CBC Auto Differential    Collection Time: 02/17/25  6:30 AM    Specimen: Blood   Result Value Ref Range    WBC 4.14 3.40 - 10.80 10*3/mm3    RBC 4.71 3.77 - 5.28 10*6/mm3    Hemoglobin 14.4 12.0 - 15.9 g/dL    Hematocrit 42.9 34.0 - 46.6 %    MCV 91.1 79.0 - 97.0 fL    MCH 30.6 26.6 - 33.0 pg    MCHC 33.6 31.5 - 35.7 g/dL    RDW 14.2 12.3 - 15.4 %    RDW-SD 47.1 37.0 - 54.0 fl    MPV 10.4 6.0 - 12.0 fL    Platelets 118 (L) 140 - 450 10*3/mm3    Neutrophil % 61.6 42.7 - 76.0 %    Lymphocyte % 23.9 19.6 - 45.3 %    Monocyte % 7.5 5.0 - 12.0 %    Eosinophil % 1.7 0.3 - 6.2 %    Basophil % 1.2 0.0 - 1.5 %    Immature Grans % 4.1 (H) 0.0 - 0.5 %    Neutrophils, Absolute 2.55 1.70 - 7.00 10*3/mm3    Lymphocytes, Absolute 0.99 0.70 - 3.10 10*3/mm3     Monocytes, Absolute 0.31 0.10 - 0.90 10*3/mm3    Eosinophils, Absolute 0.07 0.00 - 0.40 10*3/mm3    Basophils, Absolute 0.05 0.00 - 0.20 10*3/mm3    Immature Grans, Absolute 0.17 (H) 0.00 - 0.05 10*3/mm3    nRBC 0.0 0.0 - 0.2 /100 WBC   Lactic Acid, Plasma    Collection Time: 02/17/25  6:30 AM    Specimen: Blood   Result Value Ref Range    Lactate 2.3 (C) 0.5 - 2.0 mmol/L   TSH    Collection Time: 02/17/25  6:30 AM    Specimen: Blood   Result Value Ref Range    TSH 9.490 (H) 0.270 - 4.200 uIU/mL   T4, Free    Collection Time: 02/17/25  6:30 AM    Specimen: Blood   Result Value Ref Range    Free T4 1.40 0.92 - 1.68 ng/dL   Ethanol    Collection Time: 02/17/25  6:30 AM    Specimen: Blood   Result Value Ref Range    Ethanol <10 0 - 10 mg/dL   Acetaminophen Level    Collection Time: 02/17/25  6:30 AM    Specimen: Blood   Result Value Ref Range    Acetaminophen <5.0 0.0 - 30.0 mcg/mL   Salicylate Level    Collection Time: 02/17/25  6:30 AM    Specimen: Blood   Result Value Ref Range    Salicylate 0.5 <=30.0 mg/dL   Procalcitonin    Collection Time: 02/17/25  6:30 AM    Specimen: Blood   Result Value Ref Range    Procalcitonin 0.05 0.00 - 0.25 ng/mL   Scan Slide    Collection Time: 02/17/25  6:30 AM    Specimen: Blood   Result Value Ref Range    RBC Morphology Normal Normal    WBC Morphology Normal Normal    Platelet Morphology Normal Normal   Phosphorus    Collection Time: 02/17/25  6:30 AM    Specimen: Blood   Result Value Ref Range    Phosphorus 3.7 2.5 - 4.5 mg/dL   High Sensitivity Troponin T 1Hr    Collection Time: 02/17/25  8:04 AM    Specimen: Blood   Result Value Ref Range    HS Troponin T 23 (H) <14 ng/L    Troponin T Numeric Delta 0 ng/L    Troponin T % Delta 0 Abnormal if >/= 20%   Urinalysis With Microscopic If Indicated (No Culture) - Straight Cath    Collection Time: 02/17/25  8:33 AM    Specimen: Straight Cath; Urine   Result Value Ref Range    Color, UA Dark Yellow (A) Yellow, Straw    Appearance, UA  Clear Clear    pH, UA 6.0 5.0 - 8.0    Specific Gravity, UA 1.021 1.001 - 1.030    Glucose, UA Negative Negative    Ketones, UA Trace (A) Negative    Bilirubin, UA Negative Negative    Blood, UA Large (3+) (A) Negative    Protein, UA >=300 mg/dL (3+) (A) Negative    Leuk Esterase, UA Trace (A) Negative    Nitrite, UA Negative Negative    Urobilinogen, UA 2.0 E.U./dL (A) 0.2 - 1.0 E.U./dL   Urinalysis, Microscopic Only - Straight Cath    Collection Time: 02/17/25  8:33 AM    Specimen: Straight Cath; Urine   Result Value Ref Range    RBC, UA Too Numerous to Count (A) None Seen, 0-2 /HPF    WBC, UA 6-10 (A) None Seen, 0-2 /HPF    Bacteria, UA 3+ (A) None Seen, Trace /HPF    Squamous Epithelial Cells, UA 3-6 (A) None Seen, 0-2 /HPF    Transitional Epithelial Cells, UA 0-2 0 - 2 /HPF    Hyaline Casts, UA Unable to determine due to loaded field 0 - 6 /LPF    Methodology Manual Light Microscopy    STAT Lactic Acid, Reflex    Collection Time: 02/17/25 10:35 AM    Specimen: Blood   Result Value Ref Range    Lactate 1.7 0.5 - 2.0 mmol/L     Note: In addition to lab results from this visit, the labs listed above may include labs taken at another facility or during a different encounter within the last 24 hours. Please correlate lab times with ED admission and discharge times for further clarification of the services performed during this visit.    MRI Brain Without Contrast   Final Result   Impression:    Tiny area of of possible acute or early subacute lacunar infarct seen within the subcortical white matter of the left posterior frontal lobe. No additional acute intracranial findings. Advanced generalized volume loss and chronic white matter changes are    present. Mesial temporal lobe structures demonstrate proportionate atrophy without additional focal hippocampal asymmetry.            Electronically Signed: Thiago Hernandez MD     2/17/2025 12:58 PM EST     Workstation ID: [id number]      CT Head Without Contrast   Final  Result   Impression:   Atrophy and chronic microvascular ischemic change. No acute intracranial process.                  Electronically Signed: Brandon Arevalo MD     2/17/2025 6:53 AM EST     Workstation ID: [id number]      XR Chest 1 View   Final Result   Impression:   No active disease.               Electronically Signed: Brandon Arevalo MD     2/17/2025 6:26 AM EST     Workstation ID: [id number]        Vitals:    02/17/25 1100 02/17/25 1130 02/17/25 1300 02/17/25 1341   BP: 115/64 125/68  131/86   BP Location:    Left arm   Patient Position:    Lying   Pulse: 58 66 57 57   Resp:    18   Temp:    96.9 °F (36.1 °C)   TempSrc:    Axillary   SpO2: 96% 95% 96% 99%   Weight:       Height:         Medications   sodium chloride 0.9 % flush 10 mL (has no administration in time range)   magnesium sulfate 2g/50 mL (PREMIX) infusion ( Intravenous Canceled Entry 2/17/25 1505)   aspirin tablet 325 mg (325 mg Oral Not Given 2/17/25 1505)   atorvastatin (LIPITOR) tablet 40 mg (has no administration in time range)   busPIRone (BUSPAR) tablet 7.5 mg (has no administration in time range)   donepezil (ARICEPT) tablet 5 mg (has no administration in time range)   ferrous sulfate tablet 325 mg (has no administration in time range)   levothyroxine (SYNTHROID, LEVOTHROID) tablet 75 mcg (75 mcg Oral Given 2/17/25 1246)   memantine (NAMENDA) tablet 10 mg (has no administration in time range)   mirtazapine (REMERON) tablet 7.5 mg (has no administration in time range)   pantoprazole (PROTONIX) EC tablet 40 mg (has no administration in time range)   traZODone (DESYREL) tablet 50 mg (has no administration in time range)   sodium chloride 0.9 % flush 10 mL (10 mL Intravenous Given 2/17/25 1502)   sodium chloride 0.9 % flush 1-10 mL (has no administration in time range)   sodium chloride 0.9 % infusion 40 mL (has no administration in time range)   heparin (porcine) 5000 UNIT/ML injection 5,000 Units (5,000 Units Subcutaneous Given 2/17/25 1503)    Potassium Replacement - Follow Nurse / BPA Driven Protocol (has no administration in time range)   Magnesium Standard Dose Replacement - Follow Nurse / BPA Driven Protocol (has no administration in time range)   Phosphorus Replacement - Follow Nurse / BPA Driven Protocol (has no administration in time range)   Calcium Replacement - Follow Nurse / BPA Driven Protocol (has no administration in time range)   acetaminophen (TYLENOL) tablet 650 mg (has no administration in time range)     Or   acetaminophen (TYLENOL) 160 MG/5ML oral solution 650 mg (has no administration in time range)     Or   acetaminophen (TYLENOL) suppository 650 mg (has no administration in time range)   sennosides-docusate (PERICOLACE) 8.6-50 MG per tablet 2 tablet (has no administration in time range)     And   polyethylene glycol (MIRALAX) packet 17 g (has no administration in time range)     And   bisacodyl (DULCOLAX) EC tablet 5 mg (has no administration in time range)     And   bisacodyl (DULCOLAX) suppository 10 mg (has no administration in time range)   ondansetron ODT (ZOFRAN-ODT) disintegrating tablet 4 mg (has no administration in time range)     Or   ondansetron (ZOFRAN) injection 4 mg (has no administration in time range)   cefTRIAXone (ROCEPHIN) 1,000 mg in sodium chloride 0.9 % 100 mL MBP (has no administration in time range)   sodium chloride 0.9 % infusion (100 mL/hr Intravenous New Bag 2/17/25 1502)   Divalproex Sodium (DEPAKOTE SPRINKLE) capsule 250 mg (has no administration in time range)   cefTRIAXone (ROCEPHIN) 1,000 mg in sodium chloride 0.9 % 100 mL MBP (0 mg Intravenous Stopped 2/17/25 0929)   levETIRAcetam (KEPPRA) injection 1,000 mg (1,000 mg Intravenous Given 2/17/25 0848)   Divalproex Sodium (DEPAKOTE SPRINKLE) capsule 500 mg (500 mg Oral Given 2/17/25 1245)     ECG/EMG Results (last 24 hours)       Procedure Component Value Units Date/Time    ECG 12 Lead ED Triage Standing Order; Weak / Dizzy / AMS [[id number]]  Collected: 02/17/25 0558     Updated: 02/17/25 1235     QT Interval 408 ms      QTC Interval 473 ms     Narrative:      Test Reason : ED Triage Standing Order~  Blood Pressure :   */*   mmHG  Vent. Rate :  81 BPM     Atrial Rate :  83 BPM     P-R Int :   * ms          QRS Dur :  82 ms      QT Int : 408 ms       P-R-T Axes :   * -22 145 degrees    QTcB Int : 473 ms    sinus rhythm  with occasional premature ventricular complexes  Anterior infarct , age undetermined  ST & T wave abnormality, consider lateral ischemia  Abnormal ECG  Confirmed by MD Mcneill Michael ([id number]) on 2/17/2025 12:35:36 PM    Referred By:            Confirmed By: Alton Mcneill MD          ECG 12 Lead ED Triage Standing Order; Weak / Dizzy / AMS   Final Result   Test Reason : ED Triage Standing Order~   Blood Pressure :   */*   mmHG   Vent. Rate :  81 BPM     Atrial Rate :  83 BPM      P-R Int :   * ms          QRS Dur :  82 ms       QT Int : 408 ms       P-R-T Axes :   * -22 145 degrees     QTcB Int : 473 ms      sinus rhythm  with occasional premature ventricular complexes   Anterior infarct , age undetermined   ST & T wave abnormality, consider lateral ischemia   Abnormal ECG   Confirmed by MD Mcneill Michael ([id number]) on 2/17/2025 12:35:36 PM      Referred By:            Confirmed By: Alton Mcneill MD                      Medical Decision Making  Amount and/or Complexity of Data Reviewed  Labs: ordered.  Radiology: ordered.  ECG/medicine tests: ordered.    Risk  Prescription drug management.  Decision regarding hospitalization.        Final diagnoses:   Acute UTI   History of stroke   Seizure   Hypomagnesemia       ED Disposition  ED Disposition       ED Disposition   Decision to Admit    Condition   --    Comment   Level of Care: Telemetry [5]   Diagnosis: Seizure [205090]   Admitting Physician: MATT DUARTE [[id number]]   Attending Physician: MATT DUARTE [[id number]]                 No follow-up provider specified.       Medication List       No changes were made to your prescriptions during this visit.            Delta Mcneill MD  02/17/25 6246

## 2025-02-18 ENCOUNTER — APPOINTMENT (OUTPATIENT)
Dept: GENERAL RADIOLOGY | Facility: HOSPITAL | Age: 75
End: 2025-02-18
Payer: MEDICARE

## 2025-02-18 LAB
ALBUMIN SERPL-MCNC: 3.2 G/DL (ref 3.5–5.2)
ALBUMIN/GLOB SERPL: 1.5 G/DL
ALP SERPL-CCNC: 80 U/L (ref 39–117)
ALT SERPL W P-5'-P-CCNC: 7 U/L (ref 1–33)
ANION GAP SERPL CALCULATED.3IONS-SCNC: 11 MMOL/L (ref 5–15)
ANION GAP SERPL CALCULATED.3IONS-SCNC: 8 MMOL/L (ref 5–15)
ARTERIAL PATENCY WRIST A: ABNORMAL
AST SERPL-CCNC: 11 U/L (ref 1–32)
ATMOSPHERIC PRESS: ABNORMAL MM[HG]
BACTERIA SPEC AEROBE CULT: NORMAL
BASE EXCESS BLDA CALC-SCNC: 1.7 MMOL/L (ref 0–2)
BASOPHILS # BLD AUTO: 0.03 10*3/MM3 (ref 0–0.2)
BASOPHILS NFR BLD AUTO: 0.3 % (ref 0–1.5)
BDY SITE: ABNORMAL
BILIRUB SERPL-MCNC: 0.5 MG/DL (ref 0–1.2)
BODY TEMPERATURE: 37
BUN SERPL-MCNC: 16 MG/DL (ref 8–23)
BUN SERPL-MCNC: 17 MG/DL (ref 8–23)
BUN/CREAT SERPL: 27.1 (ref 7–25)
BUN/CREAT SERPL: 34.7 (ref 7–25)
CALCIUM SPEC-SCNC: 8.5 MG/DL (ref 8.6–10.5)
CALCIUM SPEC-SCNC: 8.6 MG/DL (ref 8.6–10.5)
CHLORIDE SERPL-SCNC: 106 MMOL/L (ref 98–107)
CHLORIDE SERPL-SCNC: 107 MMOL/L (ref 98–107)
CHOLEST SERPL-MCNC: 140 MG/DL (ref 0–200)
CK SERPL-CCNC: 36 U/L (ref 20–180)
CO2 BLDA-SCNC: 28.5 MMOL/L (ref 22–33)
CO2 SERPL-SCNC: 27 MMOL/L (ref 22–29)
CO2 SERPL-SCNC: 28 MMOL/L (ref 22–29)
COHGB MFR BLD: 0.9 % (ref 0–2)
CREAT SERPL-MCNC: 0.49 MG/DL (ref 0.57–1)
CREAT SERPL-MCNC: 0.59 MG/DL (ref 0.57–1)
D-LACTATE SERPL-SCNC: 1.2 MMOL/L (ref 0.5–2)
D-LACTATE SERPL-SCNC: 2.2 MMOL/L (ref 0.5–2)
DEPRECATED RDW RBC AUTO: 48.9 FL (ref 37–54)
DEPRECATED RDW RBC AUTO: 50.8 FL (ref 37–54)
EGFRCR SERPLBLD CKD-EPI 2021: 93.5 ML/MIN/1.73
EGFRCR SERPLBLD CKD-EPI 2021: 97.8 ML/MIN/1.73
EOSINOPHIL # BLD AUTO: 0.04 10*3/MM3 (ref 0–0.4)
EOSINOPHIL NFR BLD AUTO: 0.5 % (ref 0.3–6.2)
EPAP: 0
ERYTHROCYTE [DISTWIDTH] IN BLOOD BY AUTOMATED COUNT: 14.3 % (ref 12.3–15.4)
ERYTHROCYTE [DISTWIDTH] IN BLOOD BY AUTOMATED COUNT: 14.7 % (ref 12.3–15.4)
GEN 5 1HR TROPONIN T REFLEX: 36 NG/L
GLOBULIN UR ELPH-MCNC: 2.1 GM/DL
GLUCOSE BLDC GLUCOMTR-MCNC: 76 MG/DL (ref 70–130)
GLUCOSE BLDC GLUCOMTR-MCNC: 77 MG/DL (ref 70–130)
GLUCOSE BLDC GLUCOMTR-MCNC: 80 MG/DL (ref 70–130)
GLUCOSE BLDC GLUCOMTR-MCNC: 96 MG/DL (ref 70–130)
GLUCOSE SERPL-MCNC: 81 MG/DL (ref 65–99)
GLUCOSE SERPL-MCNC: 97 MG/DL (ref 65–99)
HBA1C MFR BLD: 5.7 % (ref 4.8–5.6)
HCO3 BLDA-SCNC: 27.1 MMOL/L (ref 20–26)
HCT VFR BLD AUTO: 39.7 % (ref 34–46.6)
HCT VFR BLD AUTO: 42.4 % (ref 34–46.6)
HCT VFR BLD CALC: 44.6 % (ref 38–51)
HDLC SERPL-MCNC: 44 MG/DL (ref 40–60)
HGB BLD-MCNC: 12.9 G/DL (ref 12–15.9)
HGB BLD-MCNC: 13.6 G/DL (ref 12–15.9)
HGB BLDA-MCNC: 14.6 G/DL (ref 14–18)
IMM GRANULOCYTES # BLD AUTO: 0.07 10*3/MM3 (ref 0–0.05)
IMM GRANULOCYTES NFR BLD AUTO: 0.8 % (ref 0–0.5)
INHALED O2 CONCENTRATION: 100 %
IPAP: 0
LDLC SERPL CALC-MCNC: 81 MG/DL (ref 0–100)
LDLC/HDLC SERPL: 1.82 {RATIO}
LYMPHOCYTES # BLD AUTO: 0.73 10*3/MM3 (ref 0.7–3.1)
LYMPHOCYTES NFR BLD AUTO: 8.3 % (ref 19.6–45.3)
Lab: ABNORMAL
MAGNESIUM SERPL-MCNC: 1.7 MG/DL (ref 1.6–2.4)
MAGNESIUM SERPL-MCNC: 2 MG/DL (ref 1.6–2.4)
MCH RBC QN AUTO: 30.6 PG (ref 26.6–33)
MCH RBC QN AUTO: 30.9 PG (ref 26.6–33)
MCHC RBC AUTO-ENTMCNC: 32.1 G/DL (ref 31.5–35.7)
MCHC RBC AUTO-ENTMCNC: 32.5 G/DL (ref 31.5–35.7)
MCV RBC AUTO: 95.2 FL (ref 79–97)
MCV RBC AUTO: 95.3 FL (ref 79–97)
METHGB BLD QL: 0.7 % (ref 0–1.5)
MODALITY: ABNORMAL
MONOCYTES # BLD AUTO: 0.23 10*3/MM3 (ref 0.1–0.9)
MONOCYTES NFR BLD AUTO: 2.6 % (ref 5–12)
NEUTROPHILS NFR BLD AUTO: 7.67 10*3/MM3 (ref 1.7–7)
NEUTROPHILS NFR BLD AUTO: 87.5 % (ref 42.7–76)
NOTIFIED BY: ABNORMAL
NOTIFIED WHO: ABNORMAL
NRBC BLD AUTO-RTO: 0 /100 WBC (ref 0–0.2)
NT-PROBNP SERPL-MCNC: 224.4 PG/ML (ref 0–1800)
OXYHGB MFR BLDV: 63.8 % (ref 94–99)
PAW @ PEAK INSP FLOW SETTING VENT: 0 CMH2O
PCO2 BLDA: 44.3 MM HG (ref 35–45)
PCO2 TEMP ADJ BLD: 44.3 MM HG (ref 35–45)
PH BLDA: 7.39 PH UNITS (ref 7.35–7.45)
PH, TEMP CORRECTED: 7.39 PH UNITS
PHOSPHATE SERPL-MCNC: 3.1 MG/DL (ref 2.5–4.5)
PLATELET # BLD AUTO: 115 10*3/MM3 (ref 140–450)
PLATELET # BLD AUTO: 165 10*3/MM3 (ref 140–450)
PMV BLD AUTO: 10.7 FL (ref 6–12)
PMV BLD AUTO: 11.4 FL (ref 6–12)
PO2 BLDA: 33.6 MM HG (ref 83–108)
PO2 TEMP ADJ BLD: 33.6 MM HG (ref 83–108)
POTASSIUM SERPL-SCNC: 3.4 MMOL/L (ref 3.5–5.2)
POTASSIUM SERPL-SCNC: 4.3 MMOL/L (ref 3.5–5.2)
POTASSIUM SERPL-SCNC: 4.3 MMOL/L (ref 3.5–5.2)
PROT SERPL-MCNC: 5.3 G/DL (ref 6–8.5)
RBC # BLD AUTO: 4.17 10*6/MM3 (ref 3.77–5.28)
RBC # BLD AUTO: 4.45 10*6/MM3 (ref 3.77–5.28)
SODIUM SERPL-SCNC: 142 MMOL/L (ref 136–145)
SODIUM SERPL-SCNC: 145 MMOL/L (ref 136–145)
TOTAL RATE: 0 BREATHS/MINUTE
TRIGL SERPL-MCNC: 79 MG/DL (ref 0–150)
TROPONIN T % DELTA: 24
TROPONIN T NUMERIC DELTA: 7 NG/L
TROPONIN T SERPL HS-MCNC: 29 NG/L
VALPROATE SERPL-MCNC: 39.4 MCG/ML (ref 50–125)
VENTILATOR MODE: ABNORMAL
VLDLC SERPL-MCNC: 15 MG/DL (ref 5–40)
WBC NRBC COR # BLD AUTO: 4.9 10*3/MM3 (ref 3.4–10.8)
WBC NRBC COR # BLD AUTO: 8.77 10*3/MM3 (ref 3.4–10.8)

## 2025-02-18 PROCEDURE — 82375 ASSAY CARBOXYHB QUANT: CPT

## 2025-02-18 PROCEDURE — 82550 ASSAY OF CK (CPK): CPT | Performed by: STUDENT IN AN ORGANIZED HEALTH CARE EDUCATION/TRAINING PROGRAM

## 2025-02-18 PROCEDURE — 83735 ASSAY OF MAGNESIUM: CPT | Performed by: INTERNAL MEDICINE

## 2025-02-18 PROCEDURE — 83050 HGB METHEMOGLOBIN QUAN: CPT

## 2025-02-18 PROCEDURE — 80164 ASSAY DIPROPYLACETIC ACD TOT: CPT

## 2025-02-18 PROCEDURE — 84132 ASSAY OF SERUM POTASSIUM: CPT | Performed by: INTERNAL MEDICINE

## 2025-02-18 PROCEDURE — 94799 UNLISTED PULMONARY SVC/PX: CPT

## 2025-02-18 PROCEDURE — 84100 ASSAY OF PHOSPHORUS: CPT | Performed by: STUDENT IN AN ORGANIZED HEALTH CARE EDUCATION/TRAINING PROGRAM

## 2025-02-18 PROCEDURE — 83880 ASSAY OF NATRIURETIC PEPTIDE: CPT | Performed by: STUDENT IN AN ORGANIZED HEALTH CARE EDUCATION/TRAINING PROGRAM

## 2025-02-18 PROCEDURE — 36600 WITHDRAWAL OF ARTERIAL BLOOD: CPT

## 2025-02-18 PROCEDURE — 80061 LIPID PANEL: CPT | Performed by: INTERNAL MEDICINE

## 2025-02-18 PROCEDURE — 83735 ASSAY OF MAGNESIUM: CPT | Performed by: STUDENT IN AN ORGANIZED HEALTH CARE EDUCATION/TRAINING PROGRAM

## 2025-02-18 PROCEDURE — 93005 ELECTROCARDIOGRAM TRACING: CPT | Performed by: INTERNAL MEDICINE

## 2025-02-18 PROCEDURE — 85025 COMPLETE CBC W/AUTO DIFF WBC: CPT | Performed by: STUDENT IN AN ORGANIZED HEALTH CARE EDUCATION/TRAINING PROGRAM

## 2025-02-18 PROCEDURE — 85027 COMPLETE CBC AUTOMATED: CPT | Performed by: INTERNAL MEDICINE

## 2025-02-18 PROCEDURE — 25010000002 HEPARIN (PORCINE) PER 1000 UNITS: Performed by: INTERNAL MEDICINE

## 2025-02-18 PROCEDURE — 80053 COMPREHEN METABOLIC PANEL: CPT | Performed by: INTERNAL MEDICINE

## 2025-02-18 PROCEDURE — 99232 SBSQ HOSP IP/OBS MODERATE 35: CPT | Performed by: INTERNAL MEDICINE

## 2025-02-18 PROCEDURE — 83605 ASSAY OF LACTIC ACID: CPT | Performed by: STUDENT IN AN ORGANIZED HEALTH CARE EDUCATION/TRAINING PROGRAM

## 2025-02-18 PROCEDURE — 82948 REAGENT STRIP/BLOOD GLUCOSE: CPT

## 2025-02-18 PROCEDURE — 92610 EVALUATE SWALLOWING FUNCTION: CPT

## 2025-02-18 PROCEDURE — 93010 ELECTROCARDIOGRAM REPORT: CPT | Performed by: INTERNAL MEDICINE

## 2025-02-18 PROCEDURE — 82805 BLOOD GASES W/O2 SATURATION: CPT

## 2025-02-18 PROCEDURE — 25810000003 SODIUM CHLORIDE 0.9 % SOLUTION: Performed by: INTERNAL MEDICINE

## 2025-02-18 PROCEDURE — 71045 X-RAY EXAM CHEST 1 VIEW: CPT

## 2025-02-18 PROCEDURE — 99233 SBSQ HOSP IP/OBS HIGH 50: CPT

## 2025-02-18 PROCEDURE — 83036 HEMOGLOBIN GLYCOSYLATED A1C: CPT | Performed by: INTERNAL MEDICINE

## 2025-02-18 PROCEDURE — 25010000002 CEFTRIAXONE PER 250 MG: Performed by: INTERNAL MEDICINE

## 2025-02-18 PROCEDURE — 25010000002 POTASSIUM CHLORIDE 10 MEQ/100ML SOLUTION: Performed by: INTERNAL MEDICINE

## 2025-02-18 PROCEDURE — 87040 BLOOD CULTURE FOR BACTERIA: CPT | Performed by: STUDENT IN AN ORGANIZED HEALTH CARE EDUCATION/TRAINING PROGRAM

## 2025-02-18 PROCEDURE — 25010000002 PIPERACILLIN SOD-TAZOBACTAM PER 1 G: Performed by: STUDENT IN AN ORGANIZED HEALTH CARE EDUCATION/TRAINING PROGRAM

## 2025-02-18 PROCEDURE — 25010000002 VANCOMYCIN HCL 1.25 G RECONSTITUTED SOLUTION 1 EACH VIAL

## 2025-02-18 PROCEDURE — 25810000003 SODIUM CHLORIDE 0.9 % SOLUTION 250 ML FLEX CONT

## 2025-02-18 PROCEDURE — 83605 ASSAY OF LACTIC ACID: CPT | Performed by: PHYSICIAN ASSISTANT

## 2025-02-18 PROCEDURE — 84484 ASSAY OF TROPONIN QUANT: CPT | Performed by: STUDENT IN AN ORGANIZED HEALTH CARE EDUCATION/TRAINING PROGRAM

## 2025-02-18 PROCEDURE — 25810000003 SODIUM CHLORIDE 0.9 % SOLUTION: Performed by: STUDENT IN AN ORGANIZED HEALTH CARE EDUCATION/TRAINING PROGRAM

## 2025-02-18 RX ORDER — POTASSIUM CHLORIDE 7.45 MG/ML
10 INJECTION INTRAVENOUS
Status: DISPENSED | OUTPATIENT
Start: 2025-02-18 | End: 2025-02-18

## 2025-02-18 RX ORDER — METOPROLOL TARTRATE 1 MG/ML
INJECTION, SOLUTION INTRAVENOUS
Status: COMPLETED
Start: 2025-02-18 | End: 2025-02-18

## 2025-02-18 RX ORDER — SODIUM CHLORIDE 9 MG/ML
75 INJECTION, SOLUTION INTRAVENOUS CONTINUOUS
Status: ACTIVE | OUTPATIENT
Start: 2025-02-18 | End: 2025-02-19

## 2025-02-18 RX ORDER — FERROUS SULFATE 300 MG/5ML
300 LIQUID (ML) ORAL DAILY
Status: DISCONTINUED | OUTPATIENT
Start: 2025-02-18 | End: 2025-02-21 | Stop reason: HOSPADM

## 2025-02-18 RX ORDER — POTASSIUM CHLORIDE 7.45 MG/ML
10 INJECTION INTRAVENOUS
Status: COMPLETED | OUTPATIENT
Start: 2025-02-18 | End: 2025-02-18

## 2025-02-18 RX ORDER — MIDAZOLAM HYDROCHLORIDE 1 MG/ML
2 INJECTION, SOLUTION INTRAMUSCULAR; INTRAVENOUS
Status: DISCONTINUED | OUTPATIENT
Start: 2025-02-18 | End: 2025-02-21 | Stop reason: HOSPADM

## 2025-02-18 RX ORDER — PANTOPRAZOLE SODIUM 40 MG/10ML
40 INJECTION, POWDER, LYOPHILIZED, FOR SOLUTION INTRAVENOUS
Status: DISCONTINUED | OUTPATIENT
Start: 2025-02-18 | End: 2025-02-21 | Stop reason: HOSPADM

## 2025-02-18 RX ADMIN — HEPARIN SODIUM 5000 UNITS: 5000 INJECTION INTRAVENOUS; SUBCUTANEOUS at 21:05

## 2025-02-18 RX ADMIN — HEPARIN SODIUM 5000 UNITS: 5000 INJECTION INTRAVENOUS; SUBCUTANEOUS at 05:03

## 2025-02-18 RX ADMIN — Medication 10 ML: at 21:37

## 2025-02-18 RX ADMIN — VANCOMYCIN HYDROCHLORIDE 1250 MG: 1.25 INJECTION, POWDER, LYOPHILIZED, FOR SOLUTION INTRAVENOUS at 21:35

## 2025-02-18 RX ADMIN — METOPROLOL TARTRATE 2.5 MG: 5 INJECTION INTRAVENOUS at 19:47

## 2025-02-18 RX ADMIN — POTASSIUM CHLORIDE 10 MEQ: 7.46 INJECTION, SOLUTION INTRAVENOUS at 10:26

## 2025-02-18 RX ADMIN — SODIUM CHLORIDE 100 ML/HR: 9 INJECTION, SOLUTION INTRAVENOUS at 12:42

## 2025-02-18 RX ADMIN — SODIUM CHLORIDE 500 ML: 9 INJECTION, SOLUTION INTRAVENOUS at 20:53

## 2025-02-18 RX ADMIN — POTASSIUM CHLORIDE 10 MEQ: 7.46 INJECTION, SOLUTION INTRAVENOUS at 09:09

## 2025-02-18 RX ADMIN — POTASSIUM CHLORIDE 10 MEQ: 7.46 INJECTION, SOLUTION INTRAVENOUS at 12:43

## 2025-02-18 RX ADMIN — Medication 10 ML: at 09:06

## 2025-02-18 RX ADMIN — DIVALPROEX SODIUM 250 MG: 125 CAPSULE ORAL at 05:03

## 2025-02-18 RX ADMIN — ACETAMINOPHEN 650 MG: 650 SUPPOSITORY RECTAL at 20:17

## 2025-02-18 RX ADMIN — POTASSIUM CHLORIDE 10 MEQ: 7.46 INJECTION, SOLUTION INTRAVENOUS at 11:47

## 2025-02-18 RX ADMIN — SODIUM CHLORIDE 75 ML/HR: 9 INJECTION, SOLUTION INTRAVENOUS at 21:05

## 2025-02-18 RX ADMIN — SODIUM CHLORIDE 100 ML/HR: 9 INJECTION, SOLUTION INTRAVENOUS at 01:07

## 2025-02-18 RX ADMIN — SODIUM CHLORIDE 1000 MG: 900 INJECTION INTRAVENOUS at 05:03

## 2025-02-18 RX ADMIN — PIPERACILLIN AND TAZOBACTAM 3.38 G: 3; .375 INJECTION, POWDER, LYOPHILIZED, FOR SOLUTION INTRAVENOUS at 20:32

## 2025-02-18 RX ADMIN — PANTOPRAZOLE SODIUM 40 MG: 40 INJECTION, POWDER, FOR SOLUTION INTRAVENOUS at 09:09

## 2025-02-18 NOTE — PROGRESS NOTES
[facility] Medicine Services  PROGRESS NOTE    Patient Name: Rolanda Damon  : 1950  MRN: [id number]    Date of Admission: 2025  Primary Care Physician: Provider, No Known    Subjective   Subjective     CC:  Follow-up for seizure    HPI:  Patient seen and examined this morning.  Completely encephalopathic.  Sounds to pain and localized to be on GCS evaluation.  Spoke to her daughter-in-law/guardian at bedside.  She has been declining at the nursing home recently.  Plan for now is to manage her urinary tract infection and seizure and monitor her for the next 48 to 72 hours.  If her oral intake does not improve, will consider inpatient hospice if appropriate      Objective   Objective     Vital Signs:   Temp:  [96.4 °F (35.8 °C)-97.7 °F (36.5 °C)] 97.7 °F (36.5 °C)  Heart Rate:  [52-91] 67  Resp:  [16-18] 18  BP: (110-144)/() 139/116     Physical Exam:  General: Very lethargic and minimally responsive  Head: Atraumatic and normocephalic  Eyes: No Icterus. No pallor  Ears:  Ears appear intact with no abnormalities noted  Throat: No oral lesions, no thrush  Neck: Supple, trachea midline  Lungs: Clear to auscultation bilaterally, equal air entry, no wheezing or crackles  Heart:  Normal S1 and S2, no murmur, no gallop, No JVD, no lower extremity swelling  Abdomen:  Soft, no tenderness, no organomegaly, normal bowel sounds, no organomegaly  Extremities: pulses equal bilaterally  Skin: No bleeding, bruising or rash, normal skin turgor and elasticity  Neurologic: Cranial nerves appear intact with no evidence of facial asymmetry, normal motor and sensory functions in all 4 extremities  Psych: Completely disoriented    Results Reviewed:  LAB RESULTS:      Lab 25  0355 25  1035 25  0804 25  0630   WBC 4.90  --   --  4.14   HEMOGLOBIN 12.9  --   --  14.4   HEMATOCRIT 39.7  --   --  42.9   PLATELETS 115*  --   --  118*   NEUTROS ABS  --   --   --  2.55    IMMATURE GRANS (ABS)  --   --   --  0.17*   LYMPHS ABS  --   --   --  0.99   MONOS ABS  --   --   --  0.31   EOS ABS  --   --   --  0.07   MCV 95.2  --   --  91.1   PROCALCITONIN  --   --   --  0.05   LACTATE  --  1.7  --  2.3*   HSTROP T  --   --  23* 23*         Lab 02/18/25  0355 02/17/25  0630   SODIUM 145 144   POTASSIUM 3.4* 3.9   CHLORIDE 106 103   CO2 28.0 27.0   ANION GAP 11.0 14.0   BUN 17 22   CREATININE 0.49* 0.54*   EGFR 97.8 95.6   GLUCOSE 97 120*   CALCIUM 8.6 9.0   MAGNESIUM 2.0 1.2*   PHOSPHORUS  --  3.7   HEMOGLOBIN A1C 5.70*  --    TSH  --  9.490*         Lab 02/17/25  0630   TOTAL PROTEIN 6.0   ALBUMIN 3.5   GLOBULIN 2.5   ALT (SGPT) 8   AST (SGOT) 16   BILIRUBIN 0.6   ALK PHOS 75         Lab 02/17/25  0804 02/17/25  0630   HSTROP T 23* 23*         Lab 02/18/25  0355   CHOLESTEROL 140   LDL CHOL 81   HDL CHOL 44   TRIGLYCERIDES 79             Brief Urine Lab Results  (Last result in the past 365 days)        Color   Clarity   Blood   Leuk Est   Nitrite   Protein   CREAT   Urine HCG        02/17/25 0833 Dark Yellow   Clear   Large (3+)   Trace   Negative   >=300 mg/dL (3+)                   Microbiology Results Abnormal       None            MRI Brain Without Contrast    Result Date: 2/17/2025  MRI BRAIN WO CONTRAST Date of Exam: 2/17/2025 11:53 AM EST Indication: New onset seizure activity.  Comparison: CT earlier the same day. Technique:  Routine multiplanar/multisequence sequence images of the brain were obtained without contrast administration. Findings: A small late acute or early subacute lacunar infarct is noted within the subcortical white matter of the left posterior frontal lobe. Several additional areas of increased diffusion signal are present, without distinct ADC correlate, such as within the central yudy. Midline structures are normal and the craniocervical junction appears satisfactory. Age-related changes are present with relatively advanced generalized volume loss and typical  pontine and periventricular leukomalacia. There are also numerous chronic areas of prior lacunar infarct, within bilateral basal ganglia and the left yudy. There is otherwise no evidence of intracranial hemorrhage, mass or mass effect. There is ex vacuo prominence of the ventricles and sulci. Dedicated coronal  evaluation of the mesial temporal lobe structures demonstrates proportionate atrophy of both hippocampal structures, without distinct additional focal hippocampal asymmetry. The orbits are normal. The paranasal sinuses are grossly clear. Intracranial arterial flow voids appear maintained.     Impression: Impression: Tiny area of of possible acute or early subacute lacunar infarct seen within the subcortical white matter of the left posterior frontal lobe. No additional acute intracranial findings. Advanced generalized volume loss and chronic white matter changes are  present. Mesial temporal lobe structures demonstrate proportionate atrophy without additional focal hippocampal asymmetry. Electronically Signed: Thiago Hernandez MD  2/17/2025 12:58 PM EST  Workstation ID: [id number]    EEG    Result Date: 2/17/2025  History: 74 y old female Procedure:  A 21 Channel digital electroencephalogram was performed. The 10/20 International System of electrode placement was used and both Bipolar and referential electrode montages were monitored. EEG Description: Background is continuous and symmetric. It consists of 2-5 Hz generalized slow delta and theta wave activity. Frequent waxing and waning 1-2 Hz sharp 2-3 phasic  Generalized periodic discharges (GPDs) noted. Photic stimulation using a stepwise increase in photic frequency, results in no driving response or activation of epileptiform activity. EEG Interpretation: Diffuse slow background. 2. Frequent triphasic Generalized periodic Discharges (GPDs). Clinical correlation: GPDs suggest cerebral grey matter dysfunction. They are nonspecific and can be seen after anoxic  injury or toxic metabolic. Clinical correlation is recommended     CT Head Without Contrast    Result Date: 2/17/2025  CT HEAD WO CONTRAST Date of Exam: 2/17/2025 6:47 AM EST Indication: sz. Comparison: CT scan of the head 4/24/2024 Technique: Axial CT images were obtained of the head without contrast administration.  Automated exposure control and iterative construction methods were used. Findings: There is mild diffuse generalized atrophy. There are low-attenuation areas in the periventricular white matter consistent with chronic microvascular ischemic change. There is an old lacunar infarct in the left periventricular corona radiata. There is no mass, mass effect or midline shift. There are no abnormal extra-axial fluid collections or areas of acute hemorrhage. The paranasal sinuses are clear. The mastoid air cells are clear.     Impression: Impression: Atrophy and chronic microvascular ischemic change. No acute intracranial process. Electronically Signed: Brandon Arevalo MD  2/17/2025 6:53 AM EST  Workstation ID: [id number]    XR Chest 1 View    Result Date: 2/17/2025  XR CHEST 1 VW Date of Exam: 2/17/2025 5:50 AM EST Indication: Weak/Dizzy/AMS triage protocol Comparison: Chest radiograph April 24, 2024 Findings: The heart size and pulmonary vascular markings are normal. There is tortuosity of the thoracic aorta. There is elevation of left hemidiaphragm. The lungs are clear.     Impression: Impression: No active disease. Electronically Signed: Brandon Arevalo MD  2/17/2025 6:26 AM EST  Workstation ID: [id number]     Results for orders placed during the hospital encounter of 04/24/24    Adult Transthoracic Echo Complete W/ Cont if Necessary Per Protocol (With Agitated Saline)    Interpretation Summary    Left ventricular ejection fraction appears to be 46 - 50%.    Left ventricular wall thickness is consistent with hypertrophy.    Mild aortic valve regurgitation is present.    Saline test results are negative.      Current  medications:  Scheduled Meds:aspirin, 325 mg, Oral, Daily  atorvastatin, 40 mg, Oral, Nightly  busPIRone, 7.5 mg, Oral, BID  cefTRIAXone, 1,000 mg, Intravenous, Q24H  Divalproex Sodium, 250 mg, Oral, Q8H  donepezil, 5 mg, Oral, Nightly  ferrous sulfate, 325 mg, Oral, Daily With Breakfast  heparin (porcine), 5,000 Units, Subcutaneous, Q8H  levothyroxine, 75 mcg, Oral, Daily  memantine, 10 mg, Oral, BID  mirtazapine, 7.5 mg, Oral, Nightly  pantoprazole, 40 mg, Oral, Q AM  sodium chloride, 10 mL, Intravenous, Q12H  traZODone, 50 mg, Oral, Nightly      Continuous Infusions:sodium chloride, 100 mL/hr, Last Rate: 100 mL/hr (02/18/25 0343)      PRN Meds:.  acetaminophen **OR** acetaminophen **OR** acetaminophen    senna-docusate sodium **AND** polyethylene glycol **AND** bisacodyl **AND** bisacodyl    Calcium Replacement - Follow Nurse / BPA Driven Protocol    Magnesium Standard Dose Replacement - Follow Nurse / BPA Driven Protocol    ondansetron ODT **OR** ondansetron    Phosphorus Replacement - Follow Nurse / BPA Driven Protocol    Potassium Replacement - Follow Nurse / BPA Driven Protocol    sodium chloride    sodium chloride    sodium chloride    Assessment & Plan   Assessment & Plan     Active Hospital Problems    Diagnosis  POA    **Seizure [R56.9]  Yes    Acute UTI (urinary tract infection) [N39.0]  Unknown    Elevated TSH [R79.89]  Unknown    Other hyperlipidemia [E78.49]  Yes    Cerebrovascular accident (CVA) due to bilateral embolism of middle cerebral arteries [I63.413]  Yes    History of breast cancer [Z85.3]  Not Applicable    Thrombocytopenia [D69.6]  Yes    Type 2 diabetes mellitus [E11.9]  Yes    Dementia without behavioral disturbance [F03.90]  Yes      Resolved Hospital Problems   No resolved problems to display.        Brief Hospital Course to date:  Rolanda Damon is a 74 y.o. female with PMH significant for HTN, HLD, diet-controlled DMII,  hypothyroidism (no longer on medication with baseline elevated  TSH), breast cancer s/p bilateral mastectomy, CVA, seizure disorder (has been off Keppra for 3 years due to oversedation) and dementia (minimally conversant at baseline) who presented from her nursing facility with seizure.      Goals of care  Patient with advanced dementia and poor oral intake at baseline  She was at the nursing home with outpatient hospice  Presented to the hospital with UTI and seizure-like activity  Discussed the goals of care with her daughter-in-law who is the guardian.  Plan to treat UTI and seizure activity and monitor her clinical response and oral intake over the next 48 to 72 hours.  If no clinical meaningful improvement noted, she might be candidate for inpatient hospice    Seizure  H/o seizure disorder  Patient presented to the hospital, brought from nursing home for witnessed seizure  She has history of seizure disorder has been off Keppra for 3 years because of oversedation  Given dose of IV Keppra in the ED  Seen by neurology team and was started on Depakote loading and maintenance     Addendum: Ms. Cortes / guardian requested to hold valproate for the patient to wake up some, understanding the risk of recurrent seizures associated with this plan. Will have her on PRN versed for break through seizures     Acute ischemic stroke  MRI brain showed multiple acute to subacute lacunar infarctions within the subcortical white matter of the left posterior frontal lobe.   Currently on aspirin  Defer CTA head and neck per family request  Stroke neurology consulted    Acute UTI  Continue IV Rocephin and follow urine culture     Hypothyroidism  TSH elevated at 9.4 with normal free T4  Levothyroxine dose increased to 75 mics daily  Need repeat TSH in 6 weeks     Dementia  Continue home meds: Donepezil and memantine     Type 2 diabetes  Hypertension  Diet controlled and not on medications      Expected Discharge Location and Transportation: TBD  Expected Discharge   Expected Discharge Date:  2/19/2025; Expected Discharge Time:      VTE Prophylaxis:  Pharmacologic VTE prophylaxis orders are present.         AM-PAC 6 Clicks Score (PT): 6 (02/17/25 0293)    CODE STATUS:   Code Status and Medical Interventions: No CPR (Do Not Attempt to Resuscitate); Limited Support; No intubation (DNI), No cardioversion, No dialysis, No vasopressors   Ordered at: 02/17/25 0843     Medical Intervention Limits:    No intubation (DNI)    No cardioversion    No dialysis    No vasopressors     Level Of Support Discussed With:    Health Care Surrogate     Code Status (Patient has no pulse and is not breathing):    No CPR (Do Not Attempt to Resuscitate)     Medical Interventions (Patient has pulse or is breathing):    Limited Support       Seth Christianson MD  02/18/25

## 2025-02-18 NOTE — PROGRESS NOTES
"          Clinical Nutrition Assessment     Patient Name: Rolanda Damon  YOB: 1950  MRN: [id number]  Date of Encounter: 02/18/25 14:40 EST  Admission date: 2/17/2025  Reason for Visit: MST score 2+, Reduced oral intake, \"Unsure\" unintentional weight loss    Assessment   Nutrition Assessment   Admission Diagnosis:  Seizure [R56.9]    Problem List:    Seizure    Dementia without behavioral disturbance    Type 2 diabetes mellitus    History of breast cancer    Thrombocytopenia    Cerebrovascular accident (CVA) due to bilateral embolism of middle cerebral arteries    Other hyperlipidemia    Acute UTI (urinary tract infection)    Elevated TSH      PMH:   She  has a past medical history of Breast cancer, Dementia, and TIA (transient ischemic attack).    PSH:  She  has no past surgical history on file.    Applicable Nutrition History:       Anthropometrics     Height: Height: 157.5 cm (62\")  Last Filed Weight: Weight: 54.4 kg (119 lb 14.9 oz) (02/17/25 0550)  Method: Weight Method: Estimated  BMI: BMI (Calculated): 21.9    UBW:  110lbs per caregiver  Weight change:  reported wt loss of ~30lbs x 3 months    Nutrition Focused Physical Exam    Date:     Unable to perform due to Pt unable to participate at time of visit     Subjective   Reported/Observed/Food/Nutrition Related History:     Nutrition hx obtained from pt's caregiver at bedside. Caregiver states pt is a hospice patient and lives at [facility]. Caregiver states pt has had rapid wt loss in the past 3 months, states UBW is 110lbs and has declined to ~85lbs. Caregiver notes pt usually on thickened liquids and mechanical soft diet, has been x 6 months. Caregiver states pt has not eaten much of anything since admit 2/2 being sleepy. Will provide ONS. NKFA.     Current Nutrition Prescription   PO: NPO Diet NPO Type: Strict NPO  Oral Nutrition Supplement:   Intake: Insufficient data    Assessment & Plan   Nutrition Diagnosis   Date:  2/18            Updated:  "   Problem Predicted inadequate nutrient intake    Etiology dementia   Signs/Symptoms Caregiver report   Status: New    Goal / Objectives:   Nutrition to support treatment and Increase intake      Nutrition Intervention      Follow treatment progress, Care plan reviewed, Encourage intake, Supplement provided    Encourage po intake and supplement use  Ordered Magic Cup w/lunch and dinner (pt likes patsy)  Assist w/feeding prn  Obtain measured wt as able    Monitoring/Evaluation:   Per protocol, PO intake, Supplement intake, Weight    Emely Zarco MS,RD,LD  Time Spent:25

## 2025-02-18 NOTE — PLAN OF CARE
Goal Outcome Evaluation:  Plan of Care Reviewed With: other (see comments), patient        Progress:  (RN)       Anticipated Discharge Disposition (SLP): skilled nursing facility          SLP Swallowing Diagnosis: oral dysphagia, suspected pharyngeal dysphagia (02/18/25 8933)

## 2025-02-18 NOTE — ACP (ADVANCE CARE PLANNING)
Advance Care Planning     The patient and/or family consented to a voluntary Advance Care Planning conversation.  Individuals present for the conversation:  Daughter in Law Augusta, patient, APRN, hospice RN Randi Monroe and hospice HEIDI Portillo.     Summary of the conversation: IP hospice team reviewed goals of care with Augusta who is patients legal guardian.  She has been making decisions for the patient since 2018.  Patient has one son, Augusta's spouse and a daughter.      Reviewed patient's current condition and treatments, gradual steady decline over the past several months.  She is currently on home hospice with [facility], [facility] team at [facility].  Augusta reports they have talked about slow decline and mentioned transitioning pt off hospice due to no significant decline, she has continued to have poor PO intake and progressive weight loss, so at present time remains admitted to hospice program.      Augusta verbalizes wish to allow patient to have a couple more days of abx to treat UTI to see if she will be more alert and able to take PO food/meds.  Augusta states that the patient had a similar event last April and was thought she would pass at that time, then after several days she became more alert and able to eat again.      We discussed quality vs quantity of life.  Augusta feels that she would decline feeding tube placement because patient has very poor quality of life even prior to this event.  However she wishes to discuss with her spouse and patient's daughter prior to making that final decision.     We reviewed IP hospice and at this time Augusta wishes to continue all treatments for another day or two and then make a final decision if no improvement in patient's condition.      IP hospice team will continue to follow while the patient is here in the hospital.     F2F visit complete today.       I spent 40 minutes providing separately identifiable ACP services with the patient and/or surrogate decision  maker in a voluntary, in-person conversation discussing the patient's wishes and goals of care as detailed above.     Letha Alas, MSN, APRN, [facility]  [facility]  Hospice and Palliative Care Nurse Practitioner  02/18/25  15:28 EST

## 2025-02-18 NOTE — PROGRESS NOTES
Continued Stay Note  [facility]     Patient Name: Rolanda Damon  MRN: [id number]  Today's Date: 2/18/2025    Admit Date: 2/17/2025    Plan: Home hospice patient under the care of the hospital service   Discharge Plan       Row Name 02/18/25 1350       Plan    Plan Home hospice patient under the care of the hospital service    Plan Comments Ms. Damon is a home hospice patient of [facility] who was admitted to hospice May 2024 with a terminal diagnosis of CVD, vascular dementia, and HTN. Inpatient hospice team: Letha MENDOZA, this RN, Brielle WANGW to bedside. Present is daughter-in-law/legal guardian, Augusta. She wants patient to stay under the care of the hospital service for a couple of days to see if she improves before making a decision to go full comfort measures with inpatient hospice. Assessment done. Patient unresponsive to voice and touch during assessment. Face, jaw, and body relaxed and breathing even and unlabored. Updated Dr. Christianson, Madeleine MENDOZA, and nurse, Uzma. If inpatient hospice can be of any assist, please call ext [phone].                   Discharge Codes    No documentation.                 Expected Discharge Date and Time       Expected Discharge Date Expected Discharge Time    Feb 19, 2025               Randi Monroe RN

## 2025-02-18 NOTE — THERAPY EVALUATION
Acute Care - Speech Language Pathology   Swallow Initial Evaluation [facility]  Clinical Swallow Evaluation       Patient Name: Rolanda Damon  : 1950  MRN: [id number]  Today's Date: 2025               Admit Date: 2025    Visit Dx:     ICD-10-CM ICD-9-CM   1. Acute UTI  N39.0 599.0   2. History of stroke  Z86.73 V12.54   3. Seizure  R56.9 780.39   4. Hypomagnesemia  E83.42 275.2   5. Dysphagia, unspecified type  R13.10 787.20     Patient Active Problem List   Diagnosis    Dementia without behavioral disturbance    Type 2 diabetes mellitus    History of breast cancer    Personal history of transient ischemic attack (TIA), and cerebral infarction without residual deficits    Thrombocytopenia    Cerebrovascular accident (CVA) due to bilateral embolism of middle cerebral arteries    Other hyperlipidemia    Seizure    Acute UTI (urinary tract infection)    Elevated TSH     Past Medical History:   Diagnosis Date    Breast cancer     Dementia     TIA (transient ischemic attack)      History reviewed. No pertinent surgical history.    SLP Recommendation and Plan  SLP Swallowing Diagnosis: oral dysphagia, suspected pharyngeal dysphagia (25)  SLP Diet Recommendation: comfort diet, per physician discretion, other (see comments) (will continue to follow to re-evaluate as pt is able to participate) (25)  Recommended Precautions and Strategies: upright posture during/after eating, small bites of food and sips of liquid, general aspiration precautions, assist with feeding (25)        Monitor for Signs of Aspiration: yes, notify SLP if any concerns (25)  Recommended Diagnostics: reassess via clinical swallow evaluation (25)  Swallow Criteria for Skilled Therapeutic Interventions Met: demonstrates skilled criteria (25)  Anticipated Discharge Disposition (SLP): skilled nursing facility (25)  Rehab Potential/Prognosis, Swallowing:  re-evaluate goals as necessary (02/18/25 0820)        Oral Care Recommendations: Oral Care BID/PRN, Suction toothbrush (02/18/25 0820)                                        Progress:  (RN)      SWALLOW EVALUATION (Last 72 Hours)       SLP Adult Swallow Evaluation       Row Name 02/18/25 0820                   Rehab Evaluation    Document Type evaluation  -MM        Subjective Information no complaints  -MM        Patient Observations lethargic  -MM        Patient/Family/Caregiver Comments/Observations Pt's sitter at bedside  -MM        Patient Effort fair  -MM        Symptoms Noted During/After Treatment none  -MM        Oral Care swabbed with antiseptic solution;swabbed with sterile water  -MM           General Information    Patient Profile Reviewed yes  -MM        Pertinent History Of Current Problem Pt is a 74 year old female who was admitted to the facility from SNF for acute UTI and possible seizures. Pt has a history of CVA and Parkinson's.  -MM        Current Method of Nutrition mechanical ground textures;nectar/syrup-thick liquids  -MM        Precautions/Limitations, Vision difficult to assess  -MM        Precautions/Limitations, Hearing difficult to assess  -MM        Prior Level of Function-Communication other (see comments)  minimally verbal at baseline  -MM        Prior Level of Function-Swallowing mechanical ground textures;nectar thick liquids  -MM        Plans/Goals Discussed with patient;other (see comments)  sitter  -MM        Barriers to Rehab previous functional deficit  -MM        Patient's Goals for Discharge patient could not state  -MM           Pain    Additional Documentation Pain Scale: FACES Pre/Post-Treatment (Group)  -MM           Pain Scale: FACES Pre/Post-Treatment    Pain: FACES Scale, Pretreatment 0-->no hurt  -MM        Posttreatment Pain Rating 0-->no hurt  -MM           Oral Motor Structure and Function    Dentition Assessment missing teeth  -MM        Secretion Management  WNL/WFL  -MM        Mucosal Quality dry  -MM        Volitional Swallow unable to elicit  -MM        Volitional Cough unable to elicit  -MM           Oral Musculature and Cranial Nerve Assessment    Oral Motor General Assessment unable to assess  -MM           General Eating/Swallowing Observations    Eating/Swallowing Skills fed by SLP  -MM        Positioning During Eating upright in bed  -MM        Utensils Used spoon  -MM        Consistencies Trialed ice chips;thin liquids  -MM           Clinical Swallow Eval    Oral Prep Phase impaired  -MM        Oral Transit impaired  -MM        Oral Residue WFL  -MM        Pharyngeal Phase suspected pharyngeal impairment  -MM           Oral Prep Concerns    Oral Prep Concerns incomplete or weak lip closure around spoon;incomplete bolus preparation  -MM        Incomplete or Weak Lip Closure Around Spoon thin  -MM        Incomplete Bolus Preparation thin  -MM        Oral Prep Concerns, Comment Pt with poor labial movement and acceptance of thin via spoon on this date.  -MM           Oral Transit Concerns    Oral Transit Concerns unable to initiate oral transit  -MM           Pharyngeal Phase Concerns    Pharyngeal Phase Concerns multiple swallows;other (see comments)  suspect incomplete swallow  -MM        Multiple Swallows thin  -MM        Pharyngeal Phase Concerns, Comment Pt presents with suspected reduced hyolaryngeal elevation and excursion ?incomplete swallows and repetitive movements. Limited PO trials attempted on this date due to reduced JULIA. Pt's sitter was present at bedside and reported the pt has been on nectar thick liquids for almost 1 year and has been declining in overall status. Pt was at SNF on hospice prior to admission to this facility. SLP will continue to follow to re-evaluate pt's swallow function and discuss options pending pt's family GOC.  -MM           SLP Evaluation Clinical Impression    SLP Swallowing Diagnosis oral dysphagia;suspected pharyngeal  dysphagia  -MM        Functional Impact risk of aspiration/pneumonia;risk of malnutrition;risk of dehydration  -MM        Rehab Potential/Prognosis, Swallowing re-evaluate goals as necessary  -MM        Swallow Criteria for Skilled Therapeutic Interventions Met demonstrates skilled criteria  -MM           Recommendations    SLP Diet Recommendation comfort diet, per physician discretion;other (see comments)  will continue to follow to re-evaluate as pt is able to participate  -MM        Recommended Diagnostics reassess via clinical swallow evaluation  -MM        Recommended Precautions and Strategies upright posture during/after eating;small bites of food and sips of liquid;general aspiration precautions;assist with feeding  -MM        Oral Care Recommendations Oral Care BID/PRN;Suction toothbrush  -MM        Monitor for Signs of Aspiration yes;notify SLP if any concerns  -MM        Anticipated Discharge Disposition (SLP) skilled nursing facility  -                  User Key  (r) = Recorded By, (t) = Taken By, (c) = Cosigned By      Initials Name Effective Dates    Misty Gonzalez MS CCC-SLP 08/30/24 -                     EDUCATION  The patient has been educated in the following areas:   Dysphagia, results and recommendations.               Time Calculation:    Time Calculation- SLP       Row Name 02/18/25 1038             Time Calculation- SLP    SLP Start Time 0820  -MM      SLP Received On 02/18/25  -MM         Untimed Charges    03878-LD Eval Oral Pharyng Swallow Minutes 39  -MM         Total Minutes    Untimed Charges Total Minutes 39  -MM       Total Minutes 39  -MM                User Key  (r) = Recorded By, (t) = Taken By, (c) = Cosigned By      Initials Name Provider Type    Misty Gonzalez MS CCC-SLP Speech and Language Pathologist                    Therapy Charges for Today       Code Description Service Date Service Provider Modifiers Qty    [id number]  ST EVAL ORAL PHARYNG SWALLOW 3 2/18/2025  Misty Youngblood, MS CCC-SLP GN 1                 Misty Youngblood, MS CCC-SLP  2/18/2025

## 2025-02-18 NOTE — PLAN OF CARE
Goal Outcome Evaluation:  Plan of Care Reviewed With: patient        Progress: no change  Outcome Evaluation: SR-SB on tele.RA. NS@100ml/hr. Pt slept between care.

## 2025-02-18 NOTE — PROGRESS NOTES
[facility]    Progress Note    Patient Name: Rolanda Damon  : 1950  MRN: [id number]  Primary Care Physician:  Provider, No Known  Date of admission: 2025    Subjective     Chief Complaint: Altered mental status    History of Present Illness   Patient is seen resting comfortably in bed.  No signs of distress.  Minimally interactive.  No seizure-like activity.  Daughter-in-law at bedside  request inpatient hospice consult as patient is hospice at her facility.      Review of Systems   FARIHA due to AMS    Objective     Physical Exam  Vitals and nursing note reviewed.   Constitutional:       Appearance: She is ill-appearing.   Eyes:      Pupils: Pupils are equal, round, and reactive to light.      Comments: No nystagmus   Neurological:      Mental Status: She is unresponsive.      Cranial Nerves: No facial asymmetry.      Motor: No tremor or seizure activity.      Deep Tendon Reflexes:      Reflex Scores:       Bicep reflexes are 2+ on the right side and 2+ on the left side.     Comments:     Physical exam limited due to patient response    Unable to complete NIH due to patient mentation.            Vitals:   Temp:  [94.2 °F (34.6 °C)-97.7 °F (36.5 °C)] 97.4 °F (36.3 °C)  Heart Rate:  [56-91] 71  Resp:  [16-18] 18  BP: (101-143)/() 101/59  Flow (L/min) (Oxygen Therapy):  [2] 2    Current Medications    Current Facility-Administered Medications:     acetaminophen (TYLENOL) tablet 650 mg, 650 mg, Oral, Q4H PRN **OR** acetaminophen (TYLENOL) 160 MG/5ML oral solution 650 mg, 650 mg, Oral, Q4H PRN **OR** acetaminophen (TYLENOL) suppository 650 mg, 650 mg, Rectal, Q4H PRN, Anna Marie Alas MD    aspirin tablet 325 mg, 325 mg, Oral, Daily, Anna Marie Alas MD    atorvastatin (LIPITOR) tablet 40 mg, 40 mg, Oral, Nightly, Anna Marie Alas MD, 40 mg at 25 2220    sennosides-docusate (PERICOLACE) 8.6-50 MG per tablet 2 tablet, 2 tablet, Oral, BID PRN **AND** polyethylene  glycol (MIRALAX) packet 17 g, 17 g, Oral, Daily PRN **AND** bisacodyl (DULCOLAX) EC tablet 5 mg, 5 mg, Oral, Daily PRN **AND** bisacodyl (DULCOLAX) suppository 10 mg, 10 mg, Rectal, Daily PRN, Anna Marie Alas MD    busPIRone (BUSPAR) tablet 7.5 mg, 7.5 mg, Oral, BID, Anna Marie Alas MD, 7.5 mg at 02/17/25 2220    Calcium Replacement - Follow Nurse / BPA Driven Protocol, , Not Applicable, PRN, Anna Marie Alas MD    cefTRIAXone (ROCEPHIN) 1,000 mg in sodium chloride 0.9 % 100 mL MBP, 1,000 mg, Intravenous, Q24H, Anna Marie Alas MD, Last Rate: 200 mL/hr at 02/18/25 0503, 1,000 mg at 02/18/25 0503    Divalproex Sodium (DEPAKOTE SPRINKLE) capsule 250 mg, 250 mg, Oral, Q8H, Madeleine Marquez, APRN, 250 mg at 02/18/25 0503    donepezil (ARICEPT) tablet 5 mg, 5 mg, Oral, Nightly, Anna Marie Alas MD, 5 mg at 02/17/25 2220    Ferrous Sulfate 300 (60 Fe) MG/5ML solution 300 mg, 300 mg, Oral, Daily, Seth Christianson MD    heparin (porcine) 5000 UNIT/ML injection 5,000 Units, 5,000 Units, Subcutaneous, Q8H, Anna Marie Alas MD, 5,000 Units at 02/18/25 0503    levothyroxine (SYNTHROID, LEVOTHROID) tablet 75 mcg, 75 mcg, Oral, Daily, Anna Marie Alas MD, 75 mcg at 02/17/25 1246    Magnesium Standard Dose Replacement - Follow Nurse / BPA Driven Protocol, , Not Applicable, PRN, Anna Marie Alas MD    memantine (NAMENDA) tablet 10 mg, 10 mg, Oral, BID, Anna Marie Alas MD, 10 mg at 02/17/25 2220    mirtazapine (REMERON) tablet 7.5 mg, 7.5 mg, Oral, Nightly, Anna Marie Alas MD, 7.5 mg at 02/17/25 2220    ondansetron ODT (ZOFRAN-ODT) disintegrating tablet 4 mg, 4 mg, Oral, Q6H PRN **OR** ondansetron (ZOFRAN) injection 4 mg, 4 mg, Intravenous, Q6H PRN, Anna Marie Alas MD    pantoprazole (PROTONIX) injection 40 mg, 40 mg, Intravenous, Q AM, Seth Christianson MD    Phosphorus Replacement - Follow Nurse / BPA Driven Protocol, , Not Applicable, PRN, Bishop,  "Anna Marie Singh MD    potassium chloride 10 mEq in 100 mL IVPB, 10 mEq, Intravenous, Q1H, Seth Christianson MD    Potassium Replacement - Follow Nurse / BPA Driven Protocol, , Not Applicable, PRN, Anna Marie Alas MD    sodium chloride 0.9 % flush 1-10 mL, 1-10 mL, Intravenous, PRN, Anna Marie Alas MD    sodium chloride 0.9 % flush 10 mL, 10 mL, Intravenous, PRN, Delta Mcneill MD    sodium chloride 0.9 % flush 10 mL, 10 mL, Intravenous, Q12H, Anna Marie Alas MD, 10 mL at 02/17/25 2221    sodium chloride 0.9 % infusion 40 mL, 40 mL, Intravenous, PRN, Anna Marie Alas MD    sodium chloride 0.9 % infusion, 100 mL/hr, Intravenous, Continuous, Anna Marie Alas MD, Last Rate: 100 mL/hr at 02/18/25 0343, 100 mL/hr at 02/18/25 0343    traZODone (DESYREL) tablet 50 mg, 50 mg, Oral, Nightly, Anna Marie Alas MD, 50 mg at 02/17/25 2220    Laboratory Results:   Lab Results   Component Value Date    GLUCOSE 97 02/18/2025    CALCIUM 8.6 02/18/2025     02/18/2025    K 3.4 (L) 02/18/2025    CO2 28.0 02/18/2025     02/18/2025    BUN 17 02/18/2025    CREATININE 0.49 (L) 02/18/2025    BCR 34.7 (H) 02/18/2025    ANIONGAP 11.0 02/18/2025     Lab Results   Component Value Date    WBC 4.90 02/18/2025    HGB 12.9 02/18/2025    HCT 39.7 02/18/2025    MCV 95.2 02/18/2025     (L) 02/18/2025     Lab Results   Component Value Date    CHOL 140 02/18/2025    CHOL 167 04/25/2024     Lab Results   Component Value Date    HDL 44 02/18/2025    HDL 39 (L) 04/25/2024     Lab Results   Component Value Date    LDL 81 02/18/2025     (H) 04/25/2024     Lab Results   Component Value Date    TRIG 79 02/18/2025    TRIG 86 04/25/2024     Lab Results   Component Value Date    HGBA1C 5.70 (H) 02/18/2025     Lab Results   Component Value Date    INR 1.07 04/27/2023    PROTIME 11.6 04/27/2023     No results found for: \"FOLATE\"  No results found for: \"[id number]\"    MRI Brain Without " Contrast    Result Date: 2/17/2025  MRI BRAIN WO CONTRAST Date of Exam: 2/17/2025 11:53 AM EST Indication: New onset seizure activity.  Comparison: CT earlier the same day. Technique:  Routine multiplanar/multisequence sequence images of the brain were obtained without contrast administration. Findings: A small late acute or early subacute lacunar infarct is noted within the subcortical white matter of the left posterior frontal lobe. Several additional areas of increased diffusion signal are present, without distinct ADC correlate, such as within the central yudy. Midline structures are normal and the craniocervical junction appears satisfactory. Age-related changes are present with relatively advanced generalized volume loss and typical pontine and periventricular leukomalacia. There are also numerous chronic areas of prior lacunar infarct, within bilateral basal ganglia and the left yudy. There is otherwise no evidence of intracranial hemorrhage, mass or mass effect. There is ex vacuo prominence of the ventricles and sulci. Dedicated coronal  evaluation of the mesial temporal lobe structures demonstrates proportionate atrophy of both hippocampal structures, without distinct additional focal hippocampal asymmetry. The orbits are normal. The paranasal sinuses are grossly clear. Intracranial arterial flow voids appear maintained.     Impression: Impression: Tiny area of of possible acute or early subacute lacunar infarct seen within the subcortical white matter of the left posterior frontal lobe. No additional acute intracranial findings. Advanced generalized volume loss and chronic white matter changes are  present. Mesial temporal lobe structures demonstrate proportionate atrophy without additional focal hippocampal asymmetry. Electronically Signed: Thiago Hernandez MD  2/17/2025 12:58 PM EST  Workstation ID: [id number]        Assessment / Plan   Brief Patient Summary:  Rolanda Damon is a 74 y.o. female with past  medical history of dementia without behavior disturbances, CVA, seizures?  And TIA who presented to [facility] after witnessed seizure-like activity at nursing facility.      Plan:   Seizure-like activity  Dementia without behavioral disturbances  UTI  Continue Depakote 250 mg 3 times daily   VPA 39.4  EEG with no ongoing seizure activity.  Cerebral dysfunction noted.  Continue seizure precautions  Continue delirium/fall precautions  Antibiotics per hospitalist team  N.p.o. till speech evaluation.  Neurochecks every shift    Multiple acute/subacute infarcts  Previous CVAs  MRI consistent with multiple subacute to acute lacunar infarcts.  Chronic white matter changes noted.  Patient's family requested for no further testing to be done at this time, given patient's underlying chronic disorders.  Will discontinue CTAs and echo.  SLP on hold due to patient mentation.      Inpatient hospice consult.  Per daughter patient is hospice at her living facility.  Plan is to treat seizures and UTI at this time and monitor mentation.     General Neurology will continue to follow    I have discussed the above with the patient, bedside RN, stroke neurology and Dr. Duran  Time spent with patient: 50 minutes in face-to-face evaluation and management of the patient.    Copied text in this note has been reviewed and is accurate as of 02/18/25.     REJI Villareal

## 2025-02-18 NOTE — PLAN OF CARE
Goal Outcome Evaluation:     VSS. Pt remains on 2LNC. Pt remains NSR on the monitor. No pain noted. NS continues to infuse at 75mL/hr.

## 2025-02-19 ENCOUNTER — APPOINTMENT (OUTPATIENT)
Dept: GENERAL RADIOLOGY | Facility: HOSPITAL | Age: 75
End: 2025-02-19
Payer: MEDICARE

## 2025-02-19 LAB
ALBUMIN SERPL-MCNC: 3.5 G/DL (ref 3.5–5.2)
ALBUMIN/GLOB SERPL: 1.5 G/DL
ALP SERPL-CCNC: 87 U/L (ref 39–117)
ALT SERPL W P-5'-P-CCNC: 7 U/L (ref 1–33)
ANION GAP SERPL CALCULATED.3IONS-SCNC: 13 MMOL/L (ref 5–15)
AST SERPL-CCNC: 14 U/L (ref 1–32)
BACTERIA SPEC AEROBE CULT: ABNORMAL
BACTERIA UR QL AUTO: ABNORMAL /HPF
BASOPHILS # BLD AUTO: 0.04 10*3/MM3 (ref 0–0.2)
BASOPHILS NFR BLD AUTO: 0.3 % (ref 0–1.5)
BILIRUB SERPL-MCNC: 0.7 MG/DL (ref 0–1.2)
BILIRUB UR QL STRIP: NEGATIVE
BUN SERPL-MCNC: 16 MG/DL (ref 8–23)
BUN/CREAT SERPL: 23.9 (ref 7–25)
CALCIUM SPEC-SCNC: 8.7 MG/DL (ref 8.6–10.5)
CHLORIDE SERPL-SCNC: 109 MMOL/L (ref 98–107)
CLARITY UR: CLEAR
CO2 SERPL-SCNC: 23 MMOL/L (ref 22–29)
COLOR UR: YELLOW
CREAT SERPL-MCNC: 0.67 MG/DL (ref 0.57–1)
D-LACTATE SERPL-SCNC: 1.2 MMOL/L (ref 0.5–2)
DEPRECATED RDW RBC AUTO: 51 FL (ref 37–54)
EGFRCR SERPLBLD CKD-EPI 2021: 90.7 ML/MIN/1.73
EOSINOPHIL # BLD AUTO: 0 10*3/MM3 (ref 0–0.4)
EOSINOPHIL NFR BLD AUTO: 0 % (ref 0.3–6.2)
ERYTHROCYTE [DISTWIDTH] IN BLOOD BY AUTOMATED COUNT: 14.6 % (ref 12.3–15.4)
GLOBULIN UR ELPH-MCNC: 2.4 GM/DL
GLUCOSE SERPL-MCNC: 88 MG/DL (ref 65–99)
GLUCOSE UR STRIP-MCNC: NEGATIVE MG/DL
HCT VFR BLD AUTO: 44.3 % (ref 34–46.6)
HGB BLD-MCNC: 14.1 G/DL (ref 12–15.9)
HGB UR QL STRIP.AUTO: ABNORMAL
HYALINE CASTS UR QL AUTO: ABNORMAL /LPF
IMM GRANULOCYTES # BLD AUTO: 0.08 10*3/MM3 (ref 0–0.05)
IMM GRANULOCYTES NFR BLD AUTO: 0.5 % (ref 0–0.5)
KETONES UR QL STRIP: ABNORMAL
LEUKOCYTE ESTERASE UR QL STRIP.AUTO: NEGATIVE
LYMPHOCYTES # BLD AUTO: 1.25 10*3/MM3 (ref 0.7–3.1)
LYMPHOCYTES NFR BLD AUTO: 8 % (ref 19.6–45.3)
MAGNESIUM SERPL-MCNC: 1.7 MG/DL (ref 1.6–2.4)
MCH RBC QN AUTO: 30.7 PG (ref 26.6–33)
MCHC RBC AUTO-ENTMCNC: 31.8 G/DL (ref 31.5–35.7)
MCV RBC AUTO: 96.3 FL (ref 79–97)
MONOCYTES # BLD AUTO: 0.61 10*3/MM3 (ref 0.1–0.9)
MONOCYTES NFR BLD AUTO: 3.9 % (ref 5–12)
MRSA DNA SPEC QL NAA+PROBE: NEGATIVE
NEUTROPHILS NFR BLD AUTO: 13.64 10*3/MM3 (ref 1.7–7)
NEUTROPHILS NFR BLD AUTO: 87.3 % (ref 42.7–76)
NITRITE UR QL STRIP: NEGATIVE
NRBC BLD AUTO-RTO: 0 /100 WBC (ref 0–0.2)
PH UR STRIP.AUTO: <=5 [PH] (ref 5–8)
PLATELET # BLD AUTO: 122 10*3/MM3 (ref 140–450)
PMV BLD AUTO: 10.8 FL (ref 6–12)
POTASSIUM SERPL-SCNC: 4.2 MMOL/L (ref 3.5–5.2)
PROT SERPL-MCNC: 5.9 G/DL (ref 6–8.5)
PROT UR QL STRIP: ABNORMAL
QT INTERVAL: 234 MS
QTC INTERVAL: 390 MS
RBC # BLD AUTO: 4.6 10*6/MM3 (ref 3.77–5.28)
RBC # UR STRIP: ABNORMAL /HPF
REF LAB TEST METHOD: ABNORMAL
SODIUM SERPL-SCNC: 145 MMOL/L (ref 136–145)
SP GR UR STRIP: 1.02 (ref 1–1.03)
SQUAMOUS #/AREA URNS HPF: ABNORMAL /HPF
UROBILINOGEN UR QL STRIP: ABNORMAL
WBC # UR STRIP: ABNORMAL /HPF
WBC NRBC COR # BLD AUTO: 15.62 10*3/MM3 (ref 3.4–10.8)

## 2025-02-19 PROCEDURE — 85025 COMPLETE CBC W/AUTO DIFF WBC: CPT | Performed by: INTERNAL MEDICINE

## 2025-02-19 PROCEDURE — 92611 MOTION FLUOROSCOPY/SWALLOW: CPT

## 2025-02-19 PROCEDURE — 25810000003 SODIUM CHLORIDE 0.9 % SOLUTION 250 ML FLEX CONT

## 2025-02-19 PROCEDURE — 25810000003 SODIUM CHLORIDE 0.9 % SOLUTION: Performed by: INTERNAL MEDICINE

## 2025-02-19 PROCEDURE — 99232 SBSQ HOSP IP/OBS MODERATE 35: CPT | Performed by: INTERNAL MEDICINE

## 2025-02-19 PROCEDURE — 94799 UNLISTED PULMONARY SVC/PX: CPT

## 2025-02-19 PROCEDURE — 25010000002 HEPARIN (PORCINE) PER 1000 UNITS: Performed by: INTERNAL MEDICINE

## 2025-02-19 PROCEDURE — 92610 EVALUATE SWALLOWING FUNCTION: CPT

## 2025-02-19 PROCEDURE — 25010000002 PIPERACILLIN SOD-TAZOBACTAM PER 1 G: Performed by: STUDENT IN AN ORGANIZED HEALTH CARE EDUCATION/TRAINING PROGRAM

## 2025-02-19 PROCEDURE — 74230 X-RAY XM SWLNG FUNCJ C+: CPT

## 2025-02-19 PROCEDURE — 94660 CPAP INITIATION&MGMT: CPT

## 2025-02-19 PROCEDURE — 99233 SBSQ HOSP IP/OBS HIGH 50: CPT

## 2025-02-19 PROCEDURE — 80053 COMPREHEN METABOLIC PANEL: CPT | Performed by: INTERNAL MEDICINE

## 2025-02-19 PROCEDURE — 87641 MR-STAPH DNA AMP PROBE: CPT

## 2025-02-19 PROCEDURE — 81001 URINALYSIS AUTO W/SCOPE: CPT | Performed by: STUDENT IN AN ORGANIZED HEALTH CARE EDUCATION/TRAINING PROGRAM

## 2025-02-19 PROCEDURE — 87086 URINE CULTURE/COLONY COUNT: CPT | Performed by: INTERNAL MEDICINE

## 2025-02-19 PROCEDURE — 25010000002 VANCOMYCIN 750 MG RECONSTITUTED SOLUTION 1 EACH VIAL

## 2025-02-19 PROCEDURE — 83735 ASSAY OF MAGNESIUM: CPT | Performed by: INTERNAL MEDICINE

## 2025-02-19 RX ADMIN — Medication 10 ML: at 09:43

## 2025-02-19 RX ADMIN — HEPARIN SODIUM 5000 UNITS: 5000 INJECTION INTRAVENOUS; SUBCUTANEOUS at 13:20

## 2025-02-19 RX ADMIN — PIPERACILLIN AND TAZOBACTAM 3.38 G: 3; .375 INJECTION, POWDER, LYOPHILIZED, FOR SOLUTION INTRAVENOUS at 17:49

## 2025-02-19 RX ADMIN — Medication 10 ML: at 20:37

## 2025-02-19 RX ADMIN — BARIUM SULFATE 100 ML: 0.81 POWDER, FOR SUSPENSION ORAL at 14:03

## 2025-02-19 RX ADMIN — BARIUM SULFATE 50 ML: 400 SUSPENSION ORAL at 14:03

## 2025-02-19 RX ADMIN — HEPARIN SODIUM 5000 UNITS: 5000 INJECTION INTRAVENOUS; SUBCUTANEOUS at 06:11

## 2025-02-19 RX ADMIN — PANTOPRAZOLE SODIUM 40 MG: 40 INJECTION, POWDER, FOR SOLUTION INTRAVENOUS at 06:11

## 2025-02-19 RX ADMIN — SODIUM CHLORIDE 750 MG: 9 INJECTION, SOLUTION INTRAVENOUS at 09:43

## 2025-02-19 RX ADMIN — SODIUM CHLORIDE 75 ML/HR: 9 INJECTION, SOLUTION INTRAVENOUS at 09:55

## 2025-02-19 RX ADMIN — HEPARIN SODIUM 5000 UNITS: 5000 INJECTION INTRAVENOUS; SUBCUTANEOUS at 20:36

## 2025-02-19 RX ADMIN — BARIUM SULFATE 20 ML: 400 PASTE ORAL at 14:03

## 2025-02-19 RX ADMIN — PIPERACILLIN AND TAZOBACTAM 3.38 G: 3; .375 INJECTION, POWDER, LYOPHILIZED, FOR SOLUTION INTRAVENOUS at 04:06

## 2025-02-19 RX ADMIN — PIPERACILLIN AND TAZOBACTAM 3.38 G: 3; .375 INJECTION, POWDER, LYOPHILIZED, FOR SOLUTION INTRAVENOUS at 11:25

## 2025-02-19 NOTE — PROGRESS NOTES
[facility] Medicine Services  PROGRESS NOTE    Patient Name: Rolanda Damon  : 1950  MRN: [id number]    Date of Admission: 2025  Primary Care Physician: Provider, No Known    Subjective   Subjective     CC:  Follow-up for seizure    HPI:  Patient seen and examined this morning.  More awake and alert today.  On oxygen at 4 L.  Had an event earlier this morning were she desatted at 65% and chest x-ray showed left lower lobe aspiration pneumonia.  Nonverbal at baseline.  No witnessed seizure activity    Objective   Objective     Vital Signs:   Temp:  [96 °F (35.6 °C)-102.6 °F (39.2 °C)] 97.4 °F (36.3 °C)  Heart Rate:  [] 64  Resp:  [18-42] 18  BP: ()/() 97/69  Flow (L/min) (Oxygen Therapy):  [2-80] 2     Physical Exam:  General: Awake, nonverbal  Head: Atraumatic and normocephalic  Eyes: No Icterus. No pallor  Ears:  Ears appear intact with no abnormalities noted  Throat: No oral lesions, no thrush  Neck: Supple, trachea midline  Lungs: Left lower lobe coarse crackles with bilateral wheezing.    Heart:  Normal S1 and S2, no murmur, no gallop, No JVD, no lower extremity swelling  Abdomen:  Soft, no tenderness, no organomegaly, normal bowel sounds, no organomegaly  Extremities: pulses equal bilaterally  Skin: No bleeding, bruising or rash, normal skin turgor and elasticity  Neurologic: Cranial nerves appear intact with no evidence of facial asymmetry, normal motor and sensory functions in all 4 extremities  Psych: Completely disoriented    Results Reviewed:  LAB RESULTS:      Lab 25  0430 25  2351 25  1549 25  0839 25  0355 25  1035 25  0804 25  0630   WBC 15.62*  --  8.77  --   --  4.90  --   --  4.14   HEMOGLOBIN 14.1  --  13.6  --   --  12.9  --   --  14.4   HEMATOCRIT 44.3  --  42.4  --   --  39.7  --   --  42.9   PLATELETS 122*  --  165  --   --  115*  --   --  118*   NEUTROS ABS 13.64*  --  7.67*   --   --   --   --   --  2.55   IMMATURE GRANS (ABS) 0.08*  --  0.07*  --   --   --   --   --  0.17*   LYMPHS ABS 1.25  --  0.73  --   --   --   --   --  0.99   MONOS ABS 0.61  --  0.23  --   --   --   --   --  0.31   EOS ABS 0.00  --  0.04  --   --   --   --   --  0.07   MCV 96.3  --  95.3  --   --  95.2  --   --  91.1   PROCALCITONIN  --   --   --   --   --   --   --   --  0.05   LACTATE  --  1.2 2.2*  --  1.2  --  1.7  --  2.3*   HSTROP T  --   --  36* 29*  --   --   --  23* 23*         Lab 02/19/25 0430 02/18/25 2017 02/18/25  1549 02/18/25  0355 02/17/25  0630   SODIUM 145  --  142 145 144   POTASSIUM 4.2  --  4.3  4.3 3.4* 3.9   CHLORIDE 109*  --  107 106 103   CO2 23.0  --  27.0 28.0 27.0   ANION GAP 13.0  --  8.0 11.0 14.0   BUN 16  --  16 17 22   CREATININE 0.67  --  0.59 0.49* 0.54*   EGFR 90.7  --  93.5 97.8 95.6   GLUCOSE 88  --  81 97 120*   CALCIUM 8.7  --  8.5* 8.6 9.0   MAGNESIUM 1.7 1.7  --  2.0 1.2*   PHOSPHORUS  --   --  3.1  --  3.7   HEMOGLOBIN A1C  --   --   --  5.70*  --    TSH  --   --   --   --  9.490*         Lab 02/19/25 0430 02/18/25  1549 02/17/25  0630   TOTAL PROTEIN 5.9* 5.3* 6.0   ALBUMIN 3.5 3.2* 3.5   GLOBULIN 2.4 2.1 2.5   ALT (SGPT) 7 7 8   AST (SGOT) 14 11 16   BILIRUBIN 0.7 0.5 0.6   ALK PHOS 87 80 75         Lab 02/18/25  2017 02/18/25  1549 02/17/25  0804 02/17/25  0630   PROBNP  --  224.4  --   --    HSTROP T 36* 29* 23* 23*         Lab 02/18/25  0355   CHOLESTEROL 140   LDL CHOL 81   HDL CHOL 44   TRIGLYCERIDES 79             Lab 02/18/25  1945   PH, ARTERIAL 7.395   PCO2, ARTERIAL 44.3   PO2 ART 33.6*   FIO2 100   HCO3 ART 27.1*   BASE EXCESS ART 1.7   CARBOXYHEMOGLOBIN 0.9     Brief Urine Lab Results  (Last result in the past 365 days)        Color   Clarity   Blood   Leuk Est   Nitrite   Protein   CREAT   Urine HCG        02/19/25 0957 Yellow   Clear   Small (1+)   Negative   Negative   30 mg/dL (1+)                   Microbiology Results Abnormal       Procedure  Component Value - Date/Time    Urine Culture - Urine, Straight Cath [[id number]]  (Abnormal) Collected: 02/17/25 0833    Lab Status: Final result Specimen: Urine from Straight Cath Updated: 02/19/25 0918     Urine Culture >100,000 CFU/mL Aerococcus urinae     Comment:   Aerococcus spp. are usually susceptible to beta-lactams and vancomycin. Resistance has been reported to the fluoroquinolones. Routine susceptibility testing is not recommended. Please call lab to request further workup.       Narrative:      Colonization of the urinary tract without infection is common. Treatment is discouraged unless the patient is symptomatic, pregnant, or undergoing an invasive urologic procedure.            XR Chest 1 View    Result Date: 2/18/2025  XR CHEST 1 VW Date of Exam: 2/18/2025 7:47 PM EST Indication: rapid Comparison: Chest radiograph 2/17/2025 Findings: Mediastinum: Cardiac silhouette appears unchanged and normal in size Lungs: Similar elevated left hemidiaphragm with left basal opacities, likely representing atelectasis. Right lung appears clear. Pleura: No pleural effusion or pneumothorax. Bones and soft tissues: No acute, displaced fracture seen.     Impression: Impression: Similar elevated left hemidiaphragm with left basal opacities, likely representing atelectasis. Otherwise, no acute intrathoracic findings. Electronically Signed: Ino Read  2/18/2025 8:16 PM EST  Workstation ID: [id number]     Results for orders placed during the hospital encounter of 04/24/24    Adult Transthoracic Echo Complete W/ Cont if Necessary Per Protocol (With Agitated Saline)    Interpretation Summary    Left ventricular ejection fraction appears to be 46 - 50%.    Left ventricular wall thickness is consistent with hypertrophy.    Mild aortic valve regurgitation is present.    Saline test results are negative.      Current medications:  Scheduled Meds:aspirin, 325 mg, Oral, Daily  atorvastatin, 40 mg, Oral, Nightly  busPIRone, 7.5  mg, Oral, BID  [Held by provider] Divalproex Sodium, 250 mg, Oral, Q8H  donepezil, 5 mg, Oral, Nightly  Ferrous Sulfate, 300 mg, Oral, Daily  heparin (porcine), 5,000 Units, Subcutaneous, Q8H  levothyroxine, 75 mcg, Oral, Daily  memantine, 10 mg, Oral, BID  mirtazapine, 7.5 mg, Oral, Nightly  pantoprazole, 40 mg, Intravenous, Q AM  piperacillin-tazobactam, 3.375 g, Intravenous, Q8H  sodium chloride, 10 mL, Intravenous, Q12H  traZODone, 50 mg, Oral, Nightly      Continuous Infusions:sodium chloride, 75 mL/hr, Last Rate: 75 mL/hr (02/19/25 0955)      PRN Meds:.  acetaminophen **OR** acetaminophen **OR** acetaminophen    senna-docusate sodium **AND** polyethylene glycol **AND** bisacodyl **AND** bisacodyl    Calcium Replacement - Follow Nurse / BPA Driven Protocol    Magnesium Standard Dose Replacement - Follow Nurse / BPA Driven Protocol    midazolam    ondansetron ODT **OR** ondansetron    Phosphorus Replacement - Follow Nurse / BPA Driven Protocol    Potassium Replacement - Follow Nurse / BPA Driven Protocol    sodium chloride    sodium chloride    sodium chloride    Assessment & Plan   Assessment & Plan     Active Hospital Problems    Diagnosis  POA    **Seizure [R56.9]  Yes    Acute UTI (urinary tract infection) [N39.0]  Unknown    Elevated TSH [R79.89]  Unknown    Other hyperlipidemia [E78.49]  Yes    Cerebrovascular accident (CVA) due to bilateral embolism of middle cerebral arteries [I63.413]  Yes    History of breast cancer [Z85.3]  Not Applicable    Thrombocytopenia [D69.6]  Yes    Type 2 diabetes mellitus [E11.9]  Yes    Dementia without behavioral disturbance [F03.90]  Yes      Resolved Hospital Problems   No resolved problems to display.        Brief Hospital Course to date:  Rolanda Damon is a 74 y.o. female with PMH significant for HTN, HLD, diet-controlled DMII,  hypothyroidism (no longer on medication with baseline elevated TSH), breast cancer s/p bilateral mastectomy, CVA, seizure disorder (has been  off Keppra for 3 years due to oversedation) and dementia (minimally conversant at baseline) who presented from her nursing facility with seizure.      Goals of care  Patient with advanced dementia and poor oral intake at baseline  She was at the nursing home with outpatient hospice  Presented to the hospital with UTI and seizure-like activity  Discussed the goals of care with her daughter-in-law who is the guardian.  Plan to treat UTI and seizure activity and monitor her clinical response and oral intake over the next 48 to 72 hours.  If no clinical meaningful improvement noted, she might be candidate for inpatient hospice    Acute hypoxia  Aspiration pneumonia  Event of witnessed aspiration.  Chest x-ray with evidence of left lower lobe pneumonia  Continue IV Zosyn  Discontinue vancomycin/MRSA negative  Mucinex, DuoNebs and supportive care    Seizure  H/o seizure disorder  Patient presented to the hospital, brought from nursing home for witnessed seizure  She has history of seizure disorder has been off Keppra for 3 years because of oversedation  Given dose of IV Keppra in the ED.  Previously intolerant to Keppra because of increased sedation and Keppra discontinued  Seen by neurology team and was started on Depakote loading and maintenance.  Currently on low-dose Depakote because of increased sedation      Acute ischemic stroke  MRI brain showed multiple acute to subacute lacunar infarctions within the subcortical white matter of the left posterior frontal lobe.   Currently on aspirin  Defer CTA head and neck per family request  Stroke neurology following    Acute UTI  Continue IV Zosyn and follow urine culture     Hypothyroidism  TSH elevated at 9.4 with normal free T4  Levothyroxine dose increased to 75 mics daily  Need repeat TSH in 6 weeks     Dementia  Continue home meds: Donepezil and memantine     Type 2 diabetes  Hypertension  Diet controlled and not on medications      Expected Discharge Location and  Transportation: TBD  Expected Discharge   Expected Discharge Date: 2/21/2025; Expected Discharge Time:      VTE Prophylaxis:  Pharmacologic & mechanical VTE prophylaxis orders are present.         AM-PAC 6 Clicks Score (PT): 6 (02/19/25 6947)    CODE STATUS:   Code Status and Medical Interventions: No CPR (Do Not Attempt to Resuscitate); Limited Support; No intubation (DNI), No cardioversion, No dialysis, No vasopressors   Ordered at: 02/17/25 0843     Medical Intervention Limits:    No intubation (DNI)    No cardioversion    No dialysis    No vasopressors     Level Of Support Discussed With:    Health Care Surrogate     Code Status (Patient has no pulse and is not breathing):    No CPR (Do Not Attempt to Resuscitate)     Medical Interventions (Patient has pulse or is breathing):    Limited Support       Seth Christianson MD  02/19/25

## 2025-02-19 NOTE — SIGNIFICANT NOTE
Rapid response called @ ~19:30 for 's-160's and SpO2 60's%.    Reason for admission: Seizure in the setting of acute UTI with acute encephalopathy, Acute CVA.    Nursing staff at bedside report the patient has had no change in mental status from the days prior (unresponsive, including to noxious stimuli), but patient found to have copious oral secretions for which she has not been able to tolerate. She was seen by SLP earlier in the day who were unable to complete their assessment as the patient was unable to participate.     Earlier this evening, the patient had exhibited acute hypoxemia with SpO2 in the 60s. She was tachypneic (30), tachycardic (up to 165) and hypertensive (178/114). Patient's oropharynx was suctioned with modest improvement of SpO2 to 70s. AB.39/44/33/27 on 100% NRB.  bpm Sinus tachycardia with premature supraventricular complexes, LAD, nonspecific ST/T abnormalities in lateral leads; no ischemic ST changes.    Lung sounds evident for coarse rhonchi and rales. Patient placed on BiPAP and tolerated it well, with improvement in ventilation. CXR revealed LLL opacities consistent with aspiration, as the patient has been left-leaning in the bed. Patient also noted to be febrile @ 102F.     Labs ordered: CBC, CMP, Mg, Phos, LA, Trop, CK, Blood cultures x2  UCx on admission (+) GPC, pending sens    Patient initially received 2.5mg IV lopressor for hemodynamic control.   IV Rocephin discontinued and Zosyn/Vanc initiated.  500cc NS bolus given; will provide further IVF resuscitation as needed.    Acute respiratory failure with hypoxemia secondary to aspiration pneumonia/pneumonitis; SIRS    Will follow closely with further management pending clinical course.

## 2025-02-19 NOTE — PLAN OF CARE
Goal Outcome Evaluation:  Plan of Care Reviewed With: patient                Anticipated Discharge Disposition (SLP): skilled nursing facility     MBS completed. Safe for puree and nectar-thick liquids.      SLP Swallowing Diagnosis: mod-severe, oral dysphagia, mild, pharyngeal dysphagia (02/19/25 7911)

## 2025-02-19 NOTE — PROGRESS NOTES
[facility] Neurology    Progress Note    Patient Name: Rolanda Damon  : 1950  MRN: [id number]  Primary Care Physician:  Provider, No Known  Date of admission: 2025    Subjective     Chief Complaint: Altered mental status    History of Present Illness   Rapid response called overnight, see significant note.  Patient seen resting comfortably in bed.  No acute distress.  Patient was much more interactive.  Family bedside.  Spoke extensively to family about the risk for seizures given her acute illness.  They are agreeable to reduce dose of Depakote and will have family discussions about goals of care.    Review of Systems   FARIHA due to AMS    Objective     Physical Exam  Vitals and nursing note reviewed.   Constitutional:       General: She is not in acute distress.     Appearance: She is not ill-appearing.   Eyes:      Extraocular Movements: Extraocular movements intact.      Pupils: Pupils are equal, round, and reactive to light.      Comments: No nystagmus or deviated gaze noted   Cardiovascular:      Rate and Rhythm: Normal rate.   Neurological:      Mental Status: She is alert.      Cranial Nerves: Facial asymmetry present. No cranial nerve deficit or dysarthria.      Sensory: No sensory deficit.      Motor: Weakness present.      Comments:     Physical exam limited due to patient mentation          Vitals:   Temp:  [96 °F (35.6 °C)-102.6 °F (39.2 °C)] 97.4 °F (36.3 °C)  Heart Rate:  [] 64  Resp:  [18-42] 18  BP: ()/() 97/69  Flow (L/min) (Oxygen Therapy):  [2-80] 2    Current Medications    Current Facility-Administered Medications:     acetaminophen (TYLENOL) tablet 650 mg, 650 mg, Oral, Q4H PRN **OR** acetaminophen (TYLENOL) 160 MG/5ML oral solution 650 mg, 650 mg, Oral, Q4H PRN **OR** acetaminophen (TYLENOL) suppository 650 mg, 650 mg, Rectal, Q4H PRN, Anna Marie Alas MD, 650 mg at 25    aspirin tablet 325 mg, 325 mg, Oral, Daily, Anna Marie Alas MD     atorvastatin (LIPITOR) tablet 40 mg, 40 mg, Oral, Nightly, Anna Marie Alas MD, 40 mg at 02/17/25 2220    sennosides-docusate (PERICOLACE) 8.6-50 MG per tablet 2 tablet, 2 tablet, Oral, BID PRN **AND** polyethylene glycol (MIRALAX) packet 17 g, 17 g, Oral, Daily PRN **AND** bisacodyl (DULCOLAX) EC tablet 5 mg, 5 mg, Oral, Daily PRN **AND** bisacodyl (DULCOLAX) suppository 10 mg, 10 mg, Rectal, Daily PRN, Anna Marie Alas MD    busPIRone (BUSPAR) tablet 7.5 mg, 7.5 mg, Oral, BID, Anna Marie Alas MD, 7.5 mg at 02/17/25 2220    Calcium Replacement - Follow Nurse / BPA Driven Protocol, , Not Applicable, Bishop NOWAK Gabriela Kirk, MD    [Held by provider] Divalproex Sodium (DEPAKOTE SPRINKLE) capsule 250 mg, 250 mg, Oral, Q8H, Madeleine Marquez, APRN, 250 mg at 02/18/25 0503    donepezil (ARICEPT) tablet 5 mg, 5 mg, Oral, Nightly, Anna Marie Alas MD, 5 mg at 02/17/25 2220    Ferrous Sulfate 300 (60 Fe) MG/5ML solution 300 mg, 300 mg, Oral, Daily, Seth Christianson MD    heparin (porcine) 5000 UNIT/ML injection 5,000 Units, 5,000 Units, Subcutaneous, Q8H, Anna Marie Alas MD, 5,000 Units at 02/19/25 1320    levothyroxine (SYNTHROID, LEVOTHROID) tablet 75 mcg, 75 mcg, Oral, Daily, Anna Marie Alas MD, 75 mcg at 02/17/25 1246    Magnesium Standard Dose Replacement - Follow Nurse / BPA Driven Protocol, , Not Applicable, Bishop NOWAK Gabriela Kirk, MD    memantine (NAMENDA) tablet 10 mg, 10 mg, Oral, BID, Anna Marie Alas MD, 10 mg at 02/17/25 2220    midazolam (VERSED) injection 2 mg, 2 mg, Intravenous, Q2H PRN, Seth Christianson MD    mirtazapine (REMERON) tablet 7.5 mg, 7.5 mg, Oral, Nightly, Anan Marie Alas MD, 7.5 mg at 02/17/25 2220    ondansetron ODT (ZOFRAN-ODT) disintegrating tablet 4 mg, 4 mg, Oral, Q6H PRN **OR** ondansetron (ZOFRAN) injection 4 mg, 4 mg, Intravenous, Q6H PRN, Anna Marie Alas MD    pantoprazole (PROTONIX) injection 40 mg, 40 mg,  Intravenous, Q AM, Seth Christianson MD, 40 mg at 02/19/25 0611    Phosphorus Replacement - Follow Nurse / BPA Driven Protocol, , Not Applicable, Bishop NOWAK Gabriela Kirk, MD    piperacillin-tazobactam (ZOSYN) 3.375 g IVPB in 100 mL NS MBP (CD), 3.375 g, Intravenous, Q8H, Ralph Chester, DO, 3.375 g at 02/19/25 1125    Potassium Replacement - Follow Nurse / BPA Driven Protocol, , Not Applicable, Bishop NOWAK Gabriela Kirk, MD    sodium chloride 0.9 % flush 1-10 mL, 1-10 mL, Intravenous, PRNBishop Gabriela Kirk, MD    sodium chloride 0.9 % flush 10 mL, 10 mL, Intravenous, PRN, Delta Mcneill MD    sodium chloride 0.9 % flush 10 mL, 10 mL, Intravenous, Q12H, Anna Marie Alas MD, 10 mL at 02/19/25 0943    sodium chloride 0.9 % infusion 40 mL, 40 mL, Intravenous, PRN, Anna Marie Alas MD    sodium chloride 0.9 % infusion, 75 mL/hr, Intravenous, Continuous, Seth Christianson MD, Last Rate: 75 mL/hr at 02/19/25 0955, 75 mL/hr at 02/19/25 0955    traZODone (DESYREL) tablet 50 mg, 50 mg, Oral, Nightly, Anna Marie Alas MD, 50 mg at 02/17/25 2220    Laboratory Results:   Lab Results   Component Value Date    GLUCOSE 88 02/19/2025    CALCIUM 8.7 02/19/2025     02/19/2025    K 4.2 02/19/2025    CO2 23.0 02/19/2025     (H) 02/19/2025    BUN 16 02/19/2025    CREATININE 0.67 02/19/2025    BCR 23.9 02/19/2025    ANIONGAP 13.0 02/19/2025     Lab Results   Component Value Date    WBC 15.62 (H) 02/19/2025    HGB 14.1 02/19/2025    HCT 44.3 02/19/2025    MCV 96.3 02/19/2025     (L) 02/19/2025     Lab Results   Component Value Date    CHOL 140 02/18/2025    CHOL 167 04/25/2024     Lab Results   Component Value Date    HDL 44 02/18/2025    HDL 39 (L) 04/25/2024     Lab Results   Component Value Date    LDL 81 02/18/2025     (H) 04/25/2024     Lab Results   Component Value Date    TRIG 79 02/18/2025    TRIG 86 04/25/2024     Lab Results   Component Value Date    HGBA1C  "5.70 (H) 02/18/2025     Lab Results   Component Value Date    INR 1.07 04/27/2023    PROTIME 11.6 04/27/2023     No results found for: \"FOLATE\"  No results found for: \"[id number]\"    MRI Brain Without Contrast    Result Date: 2/17/2025  MRI BRAIN WO CONTRAST Date of Exam: 2/17/2025 11:53 AM EST Indication: New onset seizure activity.  Comparison: CT earlier the same day. Technique:  Routine multiplanar/multisequence sequence images of the brain were obtained without contrast administration. Findings: A small late acute or early subacute lacunar infarct is noted within the subcortical white matter of the left posterior frontal lobe. Several additional areas of increased diffusion signal are present, without distinct ADC correlate, such as within the central yudy. Midline structures are normal and the craniocervical junction appears satisfactory. Age-related changes are present with relatively advanced generalized volume loss and typical pontine and periventricular leukomalacia. There are also numerous chronic areas of prior lacunar infarct, within bilateral basal ganglia and the left yudy. There is otherwise no evidence of intracranial hemorrhage, mass or mass effect. There is ex vacuo prominence of the ventricles and sulci. Dedicated coronal  evaluation of the mesial temporal lobe structures demonstrates proportionate atrophy of both hippocampal structures, without distinct additional focal hippocampal asymmetry. The orbits are normal. The paranasal sinuses are grossly clear. Intracranial arterial flow voids appear maintained.     Impression: Impression: Tiny area of of possible acute or early subacute lacunar infarct seen within the subcortical white matter of the left posterior frontal lobe. No additional acute intracranial findings. Advanced generalized volume loss and chronic white matter changes are  present. Mesial temporal lobe structures demonstrate proportionate atrophy without additional focal hippocampal " asymmetry. Electronically Signed: Thiago Hernandez MD  2/17/2025 12:58 PM EST  Workstation ID: [id number]        Assessment / Plan   Brief Patient Summary:  Rolanda Damon is a 74 y.o. female with past medical history of dementia without behavior disturbances, CVA, seizures?  And TIA who presented to [facility] after witnessed seizure-like activity at nursing facility.      Plan:   Seizure-like activity  Dementia without behavioral disturbances  UTI  Decrease Depakote 125 mg 3 times daily.  Spoke extensively with patient's family about risk factor for seizure given her new diagnosis of stroke along with her UTI.  They are agreement to the reduced dose of Depakote at this time and will watch mentation.    EEG with no ongoing seizure activity.  Cerebral dysfunction noted.  Continue seizure precautions  Continue delirium/fall precautions  Antibiotics per hospitalist team  N.p.o. till speech evaluation.  Neurochecks every shift     Multiple acute/subacute infarcts  Previous CVAs  MRI consistent with multiple subacute to acute lacunar infarcts.  Chronic white matter changes noted.  Patient's family requested for no further testing to be done at this time, given patient's underlying chronic disorders.  SLP on hold due to patient mentation.  Patient's family would like to hold off on any other workup for strokes.        Inpatient hospice consult.  Per daughter patient is hospice at her living facility.  Plan is to treat seizures and UTI at this time and monitor mentation.        I have discussed the above with the patient, bedside RN, patient's family and Dr. Christianson  Time spent with patient: 50 minutes in face-to-face evaluation and management of the patient.    Copied text in this note has been reviewed and is accurate as of 02/19/25.     REJI Villareal

## 2025-02-19 NOTE — MBS/VFSS/FEES
Acute Care - Speech Language Pathology   Swallow Initial Evaluation  Joan  Modified Barium Swallow Study (MBS)     Patient Name: Rolanda Damon  : 1950  MRN: [id number]  Today's Date: 2025               Admit Date: 2025    Visit Dx:     ICD-10-CM ICD-9-CM   1. Acute UTI  N39.0 599.0   2. History of stroke  Z86.73 V12.54   3. Seizure  R56.9 780.39   4. Hypomagnesemia  E83.42 275.2   5. Oropharyngeal dysphagia  R13.12 787.22     Patient Active Problem List   Diagnosis    Dementia without behavioral disturbance    Type 2 diabetes mellitus    History of breast cancer    Personal history of transient ischemic attack (TIA), and cerebral infarction without residual deficits    Thrombocytopenia    Cerebrovascular accident (CVA) due to bilateral embolism of middle cerebral arteries    Other hyperlipidemia    Seizure    Acute UTI (urinary tract infection)    Elevated TSH     Past Medical History:   Diagnosis Date    Breast cancer     Dementia     TIA (transient ischemic attack)      History reviewed. No pertinent surgical history.    SLP Recommendation and Plan  SLP Swallowing Diagnosis: mod-severe, oral dysphagia, mild, pharyngeal dysphagia (25 134)  SLP Diet Recommendation: puree, nectar thick liquids (25 134)  Recommended Precautions and Strategies: general aspiration precautions, assist with feeding (25)  SLP Rec. for Method of Medication Administration: meds crushed, with puree (or ice cream) (25)     Monitor for Signs of Aspiration: yes, notify SLP if any concerns (25 134)  Recommended Diagnostics: other (see comments) (will await call back from legal gaurdian for ? MBS vs safest/most comfortable PO diet based on this assessment.) (25 0930)  Swallow Criteria for Skilled Therapeutic Interventions Met: demonstrates skilled criteria (25 134)  Anticipated Discharge Disposition (SLP): skilled nursing facility (25)  Rehab  Potential/Prognosis, Swallowing: re-evaluate goals as necessary (02/19/25 1345)  Therapy Frequency (Swallow): 5 days per week (02/19/25 1345)  Predicted Duration Therapy Intervention (Days): 1 week (02/19/25 1345)  Oral Care Recommendations: Oral Care BID/PRN, Suction toothbrush (02/19/25 1345)                                               SWALLOW EVALUATION (Last 72 Hours)       SLP Adult Swallow Evaluation       Row Name 02/19/25 1345 02/19/25 0930 02/18/25 0820             Rehab Evaluation    Document Type evaluation  -SM re-evaluation  -SM evaluation  -MM      Subjective Information no complaints  -SM no complaints  -SM no complaints  -MM      Patient Observations alert;cooperative  -SM alert;cooperative  -SM lethargic  -MM      Patient/Family/Caregiver Comments/Observations -- -- Pt's sitter at bedside  -MM      Patient Effort good  -SM good  -SM fair  -MM      Symptoms Noted During/After Treatment -- -- none  -MM      Oral Care -- -- swabbed with antiseptic solution;swabbed with sterile water  -MM         General Information    Patient Profile Reviewed -- -- yes  -MM      Pertinent History Of Current Problem -- -- Pt is a 74 year old female who was admitted to the facility from SNF for acute UTI and possible seizures. Pt has a history of CVA and Parkinson's.  -MM      Current Method of Nutrition NPO  -SM NPO  -SM mechanical ground textures;nectar/syrup-thick liquids  -MM      Precautions/Limitations, Vision -- -- difficult to assess  -MM      Precautions/Limitations, Hearing -- -- difficult to assess  -MM      Prior Level of Function-Communication -- -- other (see comments)  minimally verbal at baseline  -MM      Prior Level of Function-Swallowing -- -- mechanical ground textures;nectar thick liquids  -MM      Plans/Goals Discussed with -- -- patient;other (see comments)  sitter  -MM      Barriers to Rehab -- -- previous functional deficit  -MM      Patient's Goals for Discharge -- -- patient could not state   -MM         Pain    Additional Documentation -- -- Pain Scale: FACES Pre/Post-Treatment (Group)  -MM         Pain Scale: FACES Pre/Post-Treatment    Pain: FACES Scale, Pretreatment 0-->no hurt  -SM 0-->no hurt  -SM 0-->no hurt  -MM      Posttreatment Pain Rating 0-->no hurt  -SM 0-->no hurt  -SM 0-->no hurt  -MM         Oral Motor Structure and Function    Dentition Assessment -- -- missing teeth  -MM      Secretion Management -- -- WNL/WFL  -MM      Mucosal Quality -- -- dry  -MM      Volitional Swallow -- -- unable to elicit  -MM      Volitional Cough -- -- unable to elicit  -MM         Oral Musculature and Cranial Nerve Assessment    Oral Motor General Assessment -- -- unable to assess  -MM         General Eating/Swallowing Observations    Respiratory Support Currently in Use -- nasal cannula  - --      Eating/Swallowing Skills -- fed by SLP  - fed by SLP  -MM      Positioning During Eating -- upright in bed  - upright in bed  -MM      Utensils Used -- spoon  - spoon  -MM      Consistencies Trialed -- thin liquids;nectar/syrup-thick liquids;pureed  -SM ice chips;thin liquids  -MM         Clinical Swallow Eval    Oral Prep Phase -- impaired  -SM impaired  -MM      Oral Transit -- impaired  -SM impaired  -MM      Oral Residue -- impaired  -SM WFL  -MM      Pharyngeal Phase -- suspected pharyngeal impairment  - suspected pharyngeal impairment  -MM         Oral Prep Concerns    Oral Prep Concerns -- oral holding;incomplete or weak lip closure around spoon;bolus removed from mouth manually  - incomplete or weak lip closure around spoon;incomplete bolus preparation  -MM      Oral Holding -- all consistencies  -SM --      Incomplete or Weak Lip Closure Around Spoon -- all consistencies  -SM thin  -MM      Incomplete Bolus Preparation -- -- thin  -MM      Bolus Removed from Mouth Manually -- pudding  - --      Oral Prep Concerns, Comment -- -- Pt with poor labial movement and acceptance of thin via spoon on  this date.  -MM         Oral Transit Concerns    Oral Transit Concerns -- delayed initiation of bolus transit;increased oral transit time  -SM unable to initiate oral transit  -MM      Delayed Intiation of Bolus Transit -- all consistencies  -SM --      Increased Oral Transit Time -- all consistencies  -SM --         Oral Residue Concerns    Oral Residue Concerns -- diffuse residue throughout oral cavity  -SM --      Diffuse Residue Throughout Oral Cavity -- pudding  -SM --         Pharyngeal Phase Concerns    Pharyngeal Phase Concerns -- throat clear  -SM multiple swallows;other (see comments)  suspect incomplete swallow  -MM      Multiple Swallows -- -- thin  -MM      Throat Clear -- thin  -SM --      Pharyngeal Phase Concerns, Comment -- No overt signs aspiration with tsp nectar-thick liquids though high risk aspiration with all PO intake. Sitter in room. Called POA to further discuss. She wanted time to process options and requested calling SLP back later this morning.  -SM Pt presents with suspected reduced hyolaryngeal elevation and excursion ?incomplete swallows and repetitive movements. Limited PO trials attempted on this date due to reduced JULIA. Pt's sitter was present at bedside and reported the pt has been on nectar thick liquids for almost 1 year and has been declining in overall status. Pt was at SNF on hospice prior to admission to this facility. SLP will continue to follow to re-evaluate pt's swallow function and discuss options pending pt's family GOC.  -MM         MBS/VFSS    Utensils Used spoon;cup;straw  -SM -- --      Consistencies Trialed thin liquids;nectar/syrup-thick liquids;pureed  -SM -- --         MBS/VFSS Interpretation    Oral Prep Phase impaired oral phase of swallowing  -SM -- --      Oral Transit Phase impaired  -SM -- --      Oral Residue impaired  -SM -- --         Oral Preparatory Phase    Oral Preparatory Phase reduced lip closure around utensil;anterior loss;oral holding;other  (see comments)  -SM -- --      Reduced Lip Closure Around Utensil all consistencies tested;secondary to reduced labial seal;secondary to reduced lingual strength;secondary to impaired cognitive status  -SM -- --      Anterior Loss thin liquids;nectar-thick liquids;secondary to reduced labial seal;secondary to reduced lingual strength;secondary to impaired cognitive status  -SM -- --      Oral Holding all consistencies tested;secondary to impaired cognitive status  -SM -- --      Oral Preparatory Phase, Comment Biting, not pulling liquid from straw  -SM -- --         Oral Transit Phase    Impaired Oral Transit Phase delayed initiation of bolus transit;increased A-P transit time;premature spillage of liquids into pharynx  -SM -- --      Delayed Initiation of bolus transit all consistencies tested;secondary to reduced lingual control;secondary to impaired cognitive status  -SM -- --      Increased A-P Transit Time all consistencies tested;secondary to reduced lingual control;secondary to impaired cognitive status  -SM -- --      Premature Spillage of Liquids into Pharynx thin liquids;nectar-thick liquids;secondary to reduced lingual control;secondary to impaired cognitive status  -SM -- --         Oral Residue    Impaired Oral Residue diffuse residue throughout oral cavity  -SM -- --      Diffuse Residue throughout Oral Cavity all consistencies tested;secondary to reduced lingual strength;secondary to reduced lingual range of motion  -SM -- --      Response to Oral Residue partial residue clearance;with liquid wash  -SM -- --         Initiation of Pharyngeal Swallow    Initiation of Pharyngeal Swallow bolus in pyriform sinuses  -SM -- --      Pharyngeal Phase impaired pharyngeal phase of swallowing  -SM -- --      Pharyngeal Phase, Comment Mild diffuse coating. At risk aspiration 2' delayed swallow initiation + cognitive status. No aspiration observed. Did not test past tsp thin liquid 2' high risk aspiration with thin  over course of a meal. Safest remains nectar-thick liquids, which is pt's baseline thickness. Per caregiver, pt has not been showing any aversion to thickened liquids.  -SM -- --         SLP Evaluation Clinical Impression    SLP Swallowing Diagnosis mod-severe;oral dysphagia;mild;pharyngeal dysphagia  -SM mod-severe;oral dysphagia;suspected pharyngeal dysphagia  -SM oral dysphagia;suspected pharyngeal dysphagia  -MM      Functional Impact risk of aspiration/pneumonia;risk of malnutrition;risk of dehydration  -SM risk of aspiration/pneumonia;risk of malnutrition;risk of dehydration  -SM risk of aspiration/pneumonia;risk of malnutrition;risk of dehydration  -MM      Rehab Potential/Prognosis, Swallowing re-evaluate goals as necessary  -SM re-evaluate goals as necessary  -SM re-evaluate goals as necessary  -MM      Swallow Criteria for Skilled Therapeutic Interventions Met demonstrates skilled criteria  -SM -- demonstrates skilled criteria  -MM         Recommendations    Therapy Frequency (Swallow) 5 days per week  -SM -- --      Predicted Duration Therapy Intervention (Days) 1 week  -SM -- --      SLP Diet Recommendation puree;nectar thick liquids  -SM NPO;other (see comments)  if pursues comfort diet, suggest puree/nectar-thick liquids.  -SM comfort diet, per physician discretion;other (see comments)  will continue to follow to re-evaluate as pt is able to participate  -MM      Recommended Diagnostics -- other (see comments)  will await call back from legal gaurdian for ? MBS vs safest/most comfortable PO diet based on this assessment.  -SM reassess via clinical swallow evaluation  -MM      Recommended Precautions and Strategies general aspiration precautions;assist with feeding  -SM upright posture during/after eating;small bites of food and sips of liquid;general aspiration precautions;assist with feeding  -SM upright posture during/after eating;small bites of food and sips of liquid;general aspiration  precautions;assist with feeding  -MM      Oral Care Recommendations Oral Care BID/PRN;Suction toothbrush  -SM Oral Care BID/PRN;Suction toothbrush  -SM Oral Care BID/PRN;Suction toothbrush  -MM      SLP Rec. for Method of Medication Administration meds crushed;with puree  or ice cream  -SM meds via alternate route  if moved PO diet, suggest crushed in puree or ice cream.  -SM --      Monitor for Signs of Aspiration yes;notify SLP if any concerns  -SM yes;notify SLP if any concerns  -SM yes;notify SLP if any concerns  -MM      Anticipated Discharge Disposition (SLP) skilled nursing facility  - skilled nursing facility  - skilled nursing facility  -MM                User Key  (r) = Recorded By, (t) = Taken By, (c) = Cosigned By      Initials Name Effective Dates    Maine Fernandez, MS CCC-SLP 01/20/25 -     MM Misty Youngblood MS CCC-SLP 08/30/24 -                     EDUCATION  The patient has been educated in the following areas:   Dysphagia (Swallowing Impairment) Modified Diet Instruction.        SLP GOALS       Row Name 02/19/25 1345             (LTG) Patient will demonstrate functional swallow for    Diet Texture (Demonstrate functional swallow) mechanical ground textures  -SM      Liquid viscosity (Demonstrate functional swallow) nectar/ mildly thick liquids  -SM      [place] (Demonstrate functional swallow) with 1:1 assist/ supervision  -SM      Time Frame (Demonstrate functional swallow) 1 week  -SM         (STG) Patient will tolerate trials of    Consistencies Trialed (Tolerate trials) pureed textures;nectar/ mildly thick liquids  -SM      Desired Outcome (Tolerate trials) without signs/symptoms of aspiration;with adequate oral prep/transit/clearance  -SM      [place] (Tolerate trials) with 1:1 assist/ supervision  -SM      Time Frame (Tolerate trials) 1 week  -SM         (STG) Patient will tolerate therapeutic trials of    Consistencies Trialed (Tolerate therapeutic trials) mechanical  ground textures  -SM      Desired Outcome (Tolerate therapeutic trials) with adequate oral prep/transit/clearance;without signs/symptoms of aspiration  -SM      [place] (Tolerate therapeutic trials) with 1:1 assist/ supervision  -SM      Time Frame (Tolerate therapeutic trials) 1 week  -SM         (STG) Swallow 1    (STG) Swallow 1 If POA wishes to consider, as needed, for thirst or any aversion to diet, tolerate thin H2O between meals without s/s aspiration  -SM      [place] (Swallow Short Term Goal 1) with 1:1 assist/ supervision  -SM      Time Frame (Swallow Short Term Goal 1) 1 week  -SM                User Key  (r) = Recorded By, (t) = Taken By, (c) = Cosigned By      Initials Name Provider Type    Maine Fernandez MS CCC-SLP Speech and Language Pathologist                         Time Calculation:    Time Calculation- SLP       Row Name 02/19/25 1425 02/19/25 1023          Time Calculation- SLP    SLP Start Time 1345  -SM 0930  -SM     SLP Received On 02/19/25  -SM 02/19/25  -SM        Untimed Charges    21430-WH Eval Oral Pharyng Swallow Minutes -- 54  -SM     66077-OL Motion Fluoro Eval Swallow Minutes 42  -SM --        Total Minutes    Untimed Charges Total Minutes 42  -SM 54  -SM      Total Minutes 42  -SM 54  -SM               User Key  (r) = Recorded By, (t) = Taken By, (c) = Cosigned By      Initials Name Provider Type    Maine Fernandez MS CCC-SLP Speech and Language Pathologist                    Therapy Charges for Today       Code Description Service Date Service Provider Modifiers Qty    55870886685 HC ST EVAL ORAL PHARYNG SWALLOW 4 2/19/2025 Miane Eduardo MS CCC-SLP GN 1    [id number] HC ST MOTION FLUORO EVAL SWALLOW 3 2/19/2025 Maine Eduardo MS CCC-SLP GN 1                 Maine Eduardo MS CCC-SLP  2/19/2025

## 2025-02-19 NOTE — PLAN OF CARE
Goal Outcome Evaluation:   VSS. Pt assisted with feeding. Pt ate 1 spoon of magic cup from dinner tray. Poor oral intake. Continues to be on 2 LPM supplemental oxygen through NC with oxygen saturation >92%. NSR on tele monitor. Pt resting in bed at this time.

## 2025-02-19 NOTE — THERAPY RE-EVALUATION
Acute Care - Speech Language Pathology   Swallow Re-Evaluation [facility]  Clinical Swallow Evaluation     Patient Name: Rolanda Damon  : 1950  MRN: [id number]  Today's Date: 2025               Admit Date: 2025    Visit Dx:     ICD-10-CM ICD-9-CM   1. Acute UTI  N39.0 599.0   2. History of stroke  Z86.73 V12.54   3. Seizure  R56.9 780.39   4. Hypomagnesemia  E83.42 275.2   5. Dysphagia, unspecified type  R13.10 787.20     Patient Active Problem List   Diagnosis    Dementia without behavioral disturbance    Type 2 diabetes mellitus    History of breast cancer    Personal history of transient ischemic attack (TIA), and cerebral infarction without residual deficits    Thrombocytopenia    Cerebrovascular accident (CVA) due to bilateral embolism of middle cerebral arteries    Other hyperlipidemia    Seizure    Acute UTI (urinary tract infection)    Elevated TSH     Past Medical History:   Diagnosis Date    Breast cancer     Dementia     TIA (transient ischemic attack)      History reviewed. No pertinent surgical history.    SLP Recommendation and Plan  SLP Swallowing Diagnosis: mod-severe, oral dysphagia, suspected pharyngeal dysphagia (25)  SLP Diet Recommendation: NPO, other (see comments) (if pursues comfort diet, suggest puree/nectar-thick liquids.) (25)  Recommended Precautions and Strategies: upright posture during/after eating, small bites of food and sips of liquid, general aspiration precautions, assist with feeding (25)  SLP Rec. for Method of Medication Administration: meds via alternate route (if moved PO diet, suggest crushed in puree or ice cream.) (25)     Monitor for Signs of Aspiration: yes, notify SLP if any concerns (25)  Recommended Diagnostics: other (see comments) (will await call back from legal gaurdian for ? MBS vs safest/most comfortable PO diet based on this assessment.) (25)     Anticipated Discharge  Disposition (SLP): skilled nursing facility (02/19/25 0930)  Rehab Potential/Prognosis, Swallowing: re-evaluate goals as necessary (02/19/25 0930)        Oral Care Recommendations: Oral Care BID/PRN, Suction toothbrush (02/19/25 0930)                                               SWALLOW EVALUATION (Last 72 Hours)       SLP Adult Swallow Evaluation       Row Name 02/19/25 0930 02/18/25 0820                Rehab Evaluation    Document Type re-evaluation  -SM evaluation  -MM       Subjective Information no complaints  -SM no complaints  -MM       Patient Observations alert;cooperative  -SM lethargic  -MM       Patient/Family/Caregiver Comments/Observations -- Pt's sitter at bedside  -MM       Patient Effort good  -SM fair  -MM       Symptoms Noted During/After Treatment -- none  -MM       Oral Care -- swabbed with antiseptic solution;swabbed with sterile water  -MM          General Information    Patient Profile Reviewed -- yes  -MM       Pertinent History Of Current Problem -- Pt is a 74 year old female who was admitted to the facility from SNF for acute UTI and possible seizures. Pt has a history of CVA and Parkinson's.  -MM       Current Method of Nutrition NPO  -SM mechanical ground textures;nectar/syrup-thick liquids  -MM       Precautions/Limitations, Vision -- difficult to assess  -MM       Precautions/Limitations, Hearing -- difficult to assess  -MM       Prior Level of Function-Communication -- other (see comments)  minimally verbal at baseline  -MM       Prior Level of Function-Swallowing -- mechanical ground textures;nectar thick liquids  -MM       Plans/Goals Discussed with -- patient;other (see comments)  sitter  -MM       Barriers to Rehab -- previous functional deficit  -MM       Patient's Goals for Discharge -- patient could not state  -MM          Pain    Additional Documentation -- Pain Scale: FACES Pre/Post-Treatment (Group)  -MM          Pain Scale: FACES Pre/Post-Treatment    Pain: FACES Scale,  Pretreatment 0-->no hurt  -SM 0-->no hurt  -MM       Posttreatment Pain Rating 0-->no hurt  -SM 0-->no hurt  -MM          Oral Motor Structure and Function    Dentition Assessment -- missing teeth  -MM       Secretion Management -- WNL/WFL  -MM       Mucosal Quality -- dry  -MM       Volitional Swallow -- unable to elicit  -MM       Volitional Cough -- unable to elicit  -MM          Oral Musculature and Cranial Nerve Assessment    Oral Motor General Assessment -- unable to assess  -MM          General Eating/Swallowing Observations    Respiratory Support Currently in Use nasal cannula  - --       Eating/Swallowing Skills fed by SLP  -SM fed by SLP  -MM       Positioning During Eating upright in bed  - upright in bed  -MM       Utensils Used spoon  - spoon  -MM       Consistencies Trialed thin liquids;nectar/syrup-thick liquids;pureed  -SM ice chips;thin liquids  -MM          Clinical Swallow Eval    Oral Prep Phase impaired  - impaired  -MM       Oral Transit impaired  - impaired  -MM       Oral Residue impaired  - WFL  -MM       Pharyngeal Phase suspected pharyngeal impairment  - suspected pharyngeal impairment  -MM          Oral Prep Concerns    Oral Prep Concerns oral holding;incomplete or weak lip closure around spoon;bolus removed from mouth manually  - incomplete or weak lip closure around spoon;incomplete bolus preparation  -MM       Oral Holding all consistencies  - --       Incomplete or Weak Lip Closure Around Spoon all consistencies  - thin  -MM       Incomplete Bolus Preparation -- thin  -MM       Bolus Removed from Mouth Manually pudding  - --       Oral Prep Concerns, Comment -- Pt with poor labial movement and acceptance of thin via spoon on this date.  -MM          Oral Transit Concerns    Oral Transit Concerns delayed initiation of bolus transit;increased oral transit time  - unable to initiate oral transit  -MM       Delayed Intiation of Bolus Transit all consistencies  -  --       Increased Oral Transit Time all consistencies  -SM --          Oral Residue Concerns    Oral Residue Concerns diffuse residue throughout oral cavity  -SM --       Diffuse Residue Throughout Oral Cavity pudding  -SM --          Pharyngeal Phase Concerns    Pharyngeal Phase Concerns throat clear  -SM multiple swallows;other (see comments)  suspect incomplete swallow  -MM       Multiple Swallows -- thin  -MM       Throat Clear thin  -SM --       Pharyngeal Phase Concerns, Comment No overt signs aspiration with tsp nectar-thick liquids though high risk aspiration with all PO intake. Sitter in room. Called POA to further discuss. She wanted time to process options and requested calling SLP back later this morning.  -SM Pt presents with suspected reduced hyolaryngeal elevation and excursion ?incomplete swallows and repetitive movements. Limited PO trials attempted on this date due to reduced JULIA. Pt's sitter was present at bedside and reported the pt has been on nectar thick liquids for almost 1 year and has been declining in overall status. Pt was at SNF on hospice prior to admission to this facility. SLP will continue to follow to re-evaluate pt's swallow function and discuss options pending pt's family GOC.  -MM          SLP Evaluation Clinical Impression    SLP Swallowing Diagnosis mod-severe;oral dysphagia;suspected pharyngeal dysphagia  -SM oral dysphagia;suspected pharyngeal dysphagia  -MM       Functional Impact risk of aspiration/pneumonia;risk of malnutrition;risk of dehydration  -SM risk of aspiration/pneumonia;risk of malnutrition;risk of dehydration  -MM       Rehab Potential/Prognosis, Swallowing re-evaluate goals as necessary  -SM re-evaluate goals as necessary  -MM       Swallow Criteria for Skilled Therapeutic Interventions Met -- demonstrates skilled criteria  -MM          Recommendations    SLP Diet Recommendation NPO;other (see comments)  if pursues comfort diet, suggest puree/nectar-thick  liquids.  -SM comfort diet, per physician discretion;other (see comments)  will continue to follow to re-evaluate as pt is able to participate  -MM       Recommended Diagnostics other (see comments)  will await call back from legal gaurdian for ? MBS vs safest/most comfortable PO diet based on this assessment.  -SM reassess via clinical swallow evaluation  -MM       Recommended Precautions and Strategies upright posture during/after eating;small bites of food and sips of liquid;general aspiration precautions;assist with feeding  -SM upright posture during/after eating;small bites of food and sips of liquid;general aspiration precautions;assist with feeding  -MM       Oral Care Recommendations Oral Care BID/PRN;Suction toothbrush  -SM Oral Care BID/PRN;Suction toothbrush  -MM       SLP Rec. for Method of Medication Administration meds via alternate route  if moved PO diet, suggest crushed in puree or ice cream.  - --       Monitor for Signs of Aspiration yes;notify SLP if any concerns  -SM yes;notify SLP if any concerns  -MM       Anticipated Discharge Disposition (SLP) skilled nursing facility  - skilled nursing facility  -                 User Key  (r) = Recorded By, (t) = Taken By, (c) = Cosigned By      Initials Name Effective Dates     Maine Eduardo MS CCC-SLP 01/20/25 -     MM Misty Youngblood MS CCC-SLP 08/30/24 -                     EDUCATION  The patient has been educated in the following areas:   Dysphagia (Swallowing Impairment) Oral Care/Hydration Modified Diet Instruction.                Time Calculation:    Time Calculation- SLP       Row Name 02/19/25 1023             Time Calculation- SLP    SLP Start Time 0930  -      SLP Received On 02/19/25  -         Untimed Charges    [id number] Eval Oral Pharyng Swallow Minutes 54  -SM         Total Minutes    Untimed Charges Total Minutes 54  -SM       Total Minutes 54  -SM                User Key  (r) = Recorded By, (t) = Taken By, (c) =  Cosigned By      Initials Name Provider Type     Maine Eduardo, MS CCC-SLP Speech and Language Pathologist                    Therapy Charges for Today       Code Description Service Date Service Provider Modifiers Qty    [id number]  ST EVAL ORAL PHARYNG SWALLOW 4 2/19/2025 Maine Eduardo, MS CCC-SLP GN 1                 Maine Eduardo MS CCC-SLP  2/19/2025

## 2025-02-19 NOTE — PROGRESS NOTES
Pharmacy Consult - Vancomycin Dosing and Monitoring    Rolanda Damon is a 74 y.o. female receiving vancomycin therapy.     Indication: empiric  Consulting Provider:  Ralph Chester DO  ID Consult: no    Goal AUC: 400-600 mg/L*hr    Current Antimicrobial Therapy  Anti-Infectives (From admission, onward)      Ordered     Dose/Rate Route Frequency Start Stop    02/18/25 2010  vancomycin 750 mg in sodium chloride 0.9 % 250 mL IVPB-VTB        Ordering Provider: Delta Garcia PharmD    750 mg  333.3 mL/hr over 45 Minutes Intravenous Every 12 Hours 02/19/25 0900 02/23/25 2059 02/18/25 1959  piperacillin-tazobactam (ZOSYN) 3.375 g IVPB in 100 mL NS MBP (CD)        Ordering Provider: Ralph Chester DO    3.375 g  over 4 Hours Intravenous Every 8 Hours 02/19/25 0245 02/24/25 0244 02/18/25 2005  Vancomycin HCl 1,250 mg in sodium chloride 0.9 % 250 mL VTB        Ordering Provider: Delta Garcia PharmD    1,250 mg  200 mL/hr over 75 Minutes Intravenous Once 02/18/25 2100 02/18/25 1959  piperacillin-tazobactam (ZOSYN) 3.375 g IVPB in 100 mL NS MBP (CD)        Ordering Provider: Ralph Chester DO    3.375 g  over 30 Minutes Intravenous Once 02/18/25 2045 02/18/25 1959  Pharmacy to dose vancomycin        Ordering Provider: Ralph Chester DO     Not Applicable Continuous PRN 02/18/25 1957 02/23/25 1956 02/17/25 1051  cefTRIAXone (ROCEPHIN) 1,000 mg in sodium chloride 0.9 % 100 mL MBP  Status:  Discontinued        Ordering Provider: Anna Marie Alas MD    1,000 mg  200 mL/hr over 30 Minutes Intravenous Every 24 Hours 02/18/25 0600 02/18/25 1959 02/17/25 0747  cefTRIAXone (ROCEPHIN) 1,000 mg in sodium chloride 0.9 % 100 mL MBP        Ordering Provider: Delta Mcneill MD    1,000 mg  200 mL/hr over 30 Minutes Intravenous Once 02/17/25 0803 02/17/25 0929          Allergies  Allergies as of 02/17/2025 - Unable to Assess 02/17/2025   Allergen Reaction Noted    Amoxicillin Unknown - Low  "Severity 04/24/2024    Darvon [propoxyphene] Unknown - Low Severity 04/24/2024     Labs  Results from last 7 days   Lab Units 02/18/25  1549 02/18/25  0355 02/17/25  0630   BUN mg/dL 16 17 22   CREATININE mg/dL 0.59 0.49* 0.54*     Results from last 7 days   Lab Units 02/18/25  0355 02/17/25  0630   WBC 10*3/mm3 4.90 4.14     Evaluation of Dosing     Last Dose Received in the ED/Outside Facility: n/a  Is Patient on Dialysis or Renal Replacement: no    Height - 157.5 cm (62\")  Weight - 54.4 kg (119 lb 14.9 oz)    Estimated Creatinine Clearance: 69.7 mL/min (by C-G formula based on SCr of 0.59 mg/dL).    I/O last 3 completed shifts:  In: 1096.7 [I.V.:996.7; IV Piggyback:100]  Out: 375 [Urine:375]    Microbiology and Radiology  Microbiology Results (last 10 days)       Procedure Component Value - Date/Time    Urine Culture - Urine, Straight Cath [[id number]]  (Abnormal) Collected: 02/17/25 0833    Lab Status: Preliminary result Specimen: Urine from Straight Cath Updated: 02/18/25 1208     Urine Culture >100,000 CFU/mL Gram Positive Cocci    Narrative:      Colonization of the urinary tract without infection is common. Treatment is discouraged unless the patient is symptomatic, pregnant, or undergoing an invasive urologic procedure.    Blood Culture - Blood, Arm, Left [[id number]]  (Normal) Collected: 02/17/25 0630    Lab Status: Preliminary result Specimen: Blood from Arm, Left Updated: 02/18/25 0715     Blood Culture No growth at 24 hours    Narrative:      Less than seven (7) mL's of blood was collected.  Insufficient quantity may yield false negative results.    Blood Culture - Blood, Arm, Right [[id number]]  (Normal) Collected: 02/17/25 0624    Lab Status: Preliminary result Specimen: Blood from Arm, Right Updated: 02/18/25 0715     Blood Culture No growth at 24 hours    Narrative:      Less than seven (7) mL's of blood was collected.  Insufficient quantity may yield false negative results.    Urine Culture - Urine, " Straight Cath [[id number]] Collected: 02/17/25 0611    Lab Status: Final result Specimen: Urine from Straight Cath Updated: 02/18/25 1208     Urine Culture >100,000 CFU/mL Mixed Paola Isolated    Narrative:      Specimen contains mixed organisms of questionable pathogenicity suggestive of contamination. If symptoms persist, suggest recollection.  Colonization of the urinary tract without infection is common. Treatment is discouraged unless the patient is symptomatic, pregnant, or undergoing an invasive urologic procedure.          Reported Vancomycin Levels              InsightRX AUC Calculation:    Current AUC: -- mg/L*hr    Predicted Steady State AUC on Current Dose: -- mg/L*hr  _________________________________    Predicted Steady State AUC on New Dose: 493 mg/L*hr    Assessment/Plan:     Pharmacy consulted to dose vancomycin for empiric therapy, goal -600 mg/L*hr.  Patient loaded with vancomycin 1250mg ( ~ 23 mg/kg)  Urine culture with GPC, blood cultures and MRSA PCR pending collection.  Serum creatinine is 0.59, BUN is 16, and WBC is 4.9.  Based on evaluation, initiate a maintenance regimen of vancomycin 750mg ( ~ 13.8 mg/kg) every 12 hours  A vancomycin random will be assessed on 2/20 with AM labs.  Will continue to follow and adjust vancomycin dose as needed based on renal function, cultures, and patient clinical status.    Thank you,    Delta Garcia, PharmD  2/18/2025  20:23 EST

## 2025-02-19 NOTE — PROGRESS NOTES
Continued Stay Note  [facility]     Patient Name: Rolanda Damon  MRN: [id number]  Today's Date: 2/19/2025    Admit Date: 2/17/2025    Plan: Home hospice patient under the care of the hospital service   Discharge Plan       Row Name 02/19/25 1744       Plan    Plan Comments IR 30%pps is a 75yo female home hospice patient of [facility] with terminal diagnosis of CVD. Visit to bedside notes patient awake, eyes open, mumbling speech. Patient's caregiver is supportive at bedside. Patient making eye contact with her. Feet cool, caregiver relays this is baseline. Patient with even, unlabored respirations. Relaxed face, jaw, body posture. Rn reassures patient that she is safe and cared for. Care coordinated with [facility]. Patient remains under hospitalist care with hospice following with daily visits. Please call [phone] with needs/concerns.                   Discharge Codes    No documentation.                 Expected Discharge Date and Time       Expected Discharge Date Expected Discharge Time    Feb 21, 2025               Emely Woo RN

## 2025-02-19 NOTE — PLAN OF CARE
Goal Outcome Evaluation:  Plan of Care Reviewed With: caregiver, guardian                Anticipated Discharge Disposition (SLP): skilled nursing facility          SLP Swallowing Diagnosis: mod-severe, oral dysphagia, suspected pharyngeal dysphagia (02/19/25 0930)         Pt took PO trials. GOC/POC discussion with pt's legal guardian. She wanted to process options and will call SLP back later this morning. Options to include NPO and give another day vs proceed with MBS vs safest/most comfortable PO diet, which is currently puree/nectar-thick liquids.

## 2025-02-20 ENCOUNTER — APPOINTMENT (OUTPATIENT)
Dept: GENERAL RADIOLOGY | Facility: HOSPITAL | Age: 75
End: 2025-02-20
Payer: MEDICARE

## 2025-02-20 PROCEDURE — 25010000002 HEPARIN (PORCINE) PER 1000 UNITS: Performed by: INTERNAL MEDICINE

## 2025-02-20 PROCEDURE — 71045 X-RAY EXAM CHEST 1 VIEW: CPT

## 2025-02-20 PROCEDURE — 99232 SBSQ HOSP IP/OBS MODERATE 35: CPT | Performed by: INTERNAL MEDICINE

## 2025-02-20 PROCEDURE — 99232 SBSQ HOSP IP/OBS MODERATE 35: CPT

## 2025-02-20 PROCEDURE — 25010000002 PIPERACILLIN SOD-TAZOBACTAM PER 1 G: Performed by: STUDENT IN AN ORGANIZED HEALTH CARE EDUCATION/TRAINING PROGRAM

## 2025-02-20 RX ORDER — DIVALPROEX SODIUM 125 MG/1
125 CAPSULE, COATED PELLETS ORAL EVERY 12 HOURS SCHEDULED
Status: DISCONTINUED | OUTPATIENT
Start: 2025-02-20 | End: 2025-02-21 | Stop reason: HOSPADM

## 2025-02-20 RX ADMIN — Medication 10 ML: at 21:29

## 2025-02-20 RX ADMIN — PIPERACILLIN AND TAZOBACTAM 3.38 G: 3; .375 INJECTION, POWDER, LYOPHILIZED, FOR SOLUTION INTRAVENOUS at 18:11

## 2025-02-20 RX ADMIN — DIVALPROEX SODIUM 125 MG: 125 CAPSULE ORAL at 21:27

## 2025-02-20 RX ADMIN — HEPARIN SODIUM 5000 UNITS: 5000 INJECTION INTRAVENOUS; SUBCUTANEOUS at 13:54

## 2025-02-20 RX ADMIN — DIVALPROEX SODIUM 125 MG: 125 CAPSULE ORAL at 11:44

## 2025-02-20 RX ADMIN — Medication 10 ML: at 09:49

## 2025-02-20 RX ADMIN — BUSPIRONE HYDROCHLORIDE 7.5 MG: 15 TABLET ORAL at 09:48

## 2025-02-20 RX ADMIN — BUSPIRONE HYDROCHLORIDE 7.5 MG: 15 TABLET ORAL at 21:27

## 2025-02-20 RX ADMIN — MEMANTINE HYDROCHLORIDE 10 MG: 10 TABLET, FILM COATED ORAL at 09:48

## 2025-02-20 RX ADMIN — MINERAL SUPPLEMENT IRON 300 MG / 5 ML STRENGTH LIQUID 100 PER BOX UNFLAVORED 300 MG: at 09:48

## 2025-02-20 RX ADMIN — PANTOPRAZOLE SODIUM 40 MG: 40 INJECTION, POWDER, FOR SOLUTION INTRAVENOUS at 05:52

## 2025-02-20 RX ADMIN — ATORVASTATIN CALCIUM 40 MG: 40 TABLET, FILM COATED ORAL at 21:27

## 2025-02-20 RX ADMIN — HEPARIN SODIUM 5000 UNITS: 5000 INJECTION INTRAVENOUS; SUBCUTANEOUS at 05:52

## 2025-02-20 RX ADMIN — MEMANTINE HYDROCHLORIDE 10 MG: 10 TABLET, FILM COATED ORAL at 21:27

## 2025-02-20 RX ADMIN — PIPERACILLIN AND TAZOBACTAM 3.38 G: 3; .375 INJECTION, POWDER, LYOPHILIZED, FOR SOLUTION INTRAVENOUS at 09:48

## 2025-02-20 RX ADMIN — DONEPEZIL HYDROCHLORIDE 5 MG: 5 TABLET ORAL at 21:27

## 2025-02-20 RX ADMIN — ASPIRIN 325 MG: 325 TABLET ORAL at 09:48

## 2025-02-20 RX ADMIN — PIPERACILLIN AND TAZOBACTAM 3.38 G: 3; .375 INJECTION, POWDER, LYOPHILIZED, FOR SOLUTION INTRAVENOUS at 02:32

## 2025-02-20 RX ADMIN — MIRTAZAPINE 7.5 MG: 15 TABLET, FILM COATED ORAL at 21:27

## 2025-02-20 RX ADMIN — TRAZODONE HYDROCHLORIDE 50 MG: 50 TABLET ORAL at 21:27

## 2025-02-20 RX ADMIN — LEVOTHYROXINE SODIUM 75 MCG: 0.07 TABLET ORAL at 09:48

## 2025-02-20 RX ADMIN — HEPARIN SODIUM 5000 UNITS: 5000 INJECTION INTRAVENOUS; SUBCUTANEOUS at 21:27

## 2025-02-20 NOTE — PLAN OF CARE
Problem: Adult Inpatient Plan of Care  Goal: Plan of Care Review  Outcome: Progressing  Flowsheets (Taken 2/20/2025 3366)  Progress: no change  Outcome Evaluation: Patient's VSS. NSR to sinus michelle on tele. Remains on 2L nasal cannula. Skin interventions in place overnight. Continue plan of care.

## 2025-02-20 NOTE — PROGRESS NOTES
Home hospice patient here at [facility] under the care of the hospital service. Per Dr. Christianson, patient is medically ready for discharge tomorrow, 2/21. Patient will return to [facility] with continued outpatient hospice services. Made visit to bedside and discussed plan with daughter-in-law, [place]. [facility] home team updated on plan. EMS scheduled for 2/21 at 1145.

## 2025-02-20 NOTE — PROGRESS NOTES
[facility] Neurology    Progress Note    Patient Name: Rolanda Damon  : 1950  MRN: [id number]  Primary Care Physician:  Provider, No Known  Date of admission: 2025    Subjective     Chief Complaint: Seizures    History of Present Illness   Patient seen resting comfortably in bed.  Continues at her cognitive baseline.  Caregiver bedside.  No seizure-like activity noted.    Review of Systems   General: Negative for fever, nausea, or vomiting.   Neurological: Negative for headache, pain, or weakness.     Objective     Physical Exam  Vitals and nursing note reviewed.   Constitutional:       General: She is not in acute distress.     Appearance: She is not ill-appearing.   Eyes:      Extraocular Movements: Extraocular movements intact.      Pupils: Pupils are equal, round, and reactive to light.      Comments: No nystagmus or deviated gaze noted   Neurological:      Mental Status: She is alert. Mental status is at baseline.      Cranial Nerves: No facial asymmetry.      Motor: Weakness present. No seizure activity.      Comments:   Physical exam limited due to patient's baseline            Vitals:   Temp:  [97.4 °F (36.3 °C)-98.6 °F (37 °C)] 98.6 °F (37 °C)  Heart Rate:  [57-82] 57  Resp:  [16-18] 18  BP: ()/(60-77) 111/60  Flow (L/min) (Oxygen Therapy):  [2] 2    Current Medications    Current Facility-Administered Medications:     acetaminophen (TYLENOL) tablet 650 mg, 650 mg, Oral, Q4H PRN **OR** acetaminophen (TYLENOL) 160 MG/5ML oral solution 650 mg, 650 mg, Oral, Q4H PRN **OR** acetaminophen (TYLENOL) suppository 650 mg, 650 mg, Rectal, Q4H PRN, Anna Marie Alas MD, 650 mg at 25    aspirin tablet 325 mg, 325 mg, Oral, Daily, Anna Marie Alas MD, 325 mg at 25 0948    atorvastatin (LIPITOR) tablet 40 mg, 40 mg, Oral, Nightly, Anna Marie Alas MD, 40 mg at 25 2220    sennosides-docusate (PERICOLACE) 8.6-50 MG per tablet 2 tablet, 2 tablet, Oral, BID PRN  **AND** polyethylene glycol (MIRALAX) packet 17 g, 17 g, Oral, Daily PRN **AND** bisacodyl (DULCOLAX) EC tablet 5 mg, 5 mg, Oral, Daily PRN **AND** bisacodyl (DULCOLAX) suppository 10 mg, 10 mg, Rectal, Daily PRN, Anna Marie Alas MD    busPIRone (BUSPAR) tablet 7.5 mg, 7.5 mg, Oral, BID, Anna Marie Alas MD, 7.5 mg at 02/20/25 0948    Calcium Replacement - Follow Nurse / BPA Driven Protocol, , Not Applicable, PRN, Anna Marie Alas MD    Divalproex Sodium (DEPAKOTE SPRINKLE) capsule 125 mg, 125 mg, Oral, Q12H, Seth Christianson MD, 125 mg at 02/20/25 1144    donepezil (ARICEPT) tablet 5 mg, 5 mg, Oral, Nightly, Anna Marie Alas MD, 5 mg at 02/17/25 2220    Ferrous Sulfate 300 (60 Fe) MG/5ML solution 300 mg, 300 mg, Oral, Daily, Seth Christianson MD, 300 mg at 02/20/25 0948    heparin (porcine) 5000 UNIT/ML injection 5,000 Units, 5,000 Units, Subcutaneous, Q8H, Anna Marie Alas MD, 5,000 Units at 02/20/25 0552    levothyroxine (SYNTHROID, LEVOTHROID) tablet 75 mcg, 75 mcg, Oral, Daily, Anna Marie Alas MD, 75 mcg at 02/20/25 0948    Magnesium Standard Dose Replacement - Follow Nurse / BPA Driven Protocol, , Not Applicable, PRN, Anna Marie Alas MD    memantine (NAMENDA) tablet 10 mg, 10 mg, Oral, BID, Anna Marie Alas MD, 10 mg at 02/20/25 0948    midazolam (VERSED) injection 2 mg, 2 mg, Intravenous, Q2H PRN, Seth Christianson MD    mirtazapine (REMERON) tablet 7.5 mg, 7.5 mg, Oral, Nightly, Anna Marie Alas MD, 7.5 mg at 02/17/25 2220    ondansetron ODT (ZOFRAN-ODT) disintegrating tablet 4 mg, 4 mg, Oral, Q6H PRN **OR** ondansetron (ZOFRAN) injection 4 mg, 4 mg, Intravenous, Q6H PRN, Anna Marie Alas MD    pantoprazole (PROTONIX) injection 40 mg, 40 mg, Intravenous, Q AM, Seth Christianson MD, 40 mg at 02/20/25 0552    Phosphorus Replacement - Follow Nurse / BPA Driven Protocol, , Not Applicable, PRN, Anna Marie Alas MD     "piperacillin-tazobactam (ZOSYN) 3.375 g IVPB in 100 mL NS MBP (CD), 3.375 g, Intravenous, Q8H, Ralph Chester, DO, 3.375 g at 02/20/25 0948    Potassium Replacement - Follow Nurse / BPA Driven Protocol, , Not Applicable, PRN, Anna Marie Alas MD    sodium chloride 0.9 % flush 1-10 mL, 1-10 mL, Intravenous, PRN, Anna Marie Alas MD    sodium chloride 0.9 % flush 10 mL, 10 mL, Intravenous, PRN, Delta Mcneill MD    sodium chloride 0.9 % flush 10 mL, 10 mL, Intravenous, Q12H, Anna Marie Alas MD, 10 mL at 02/20/25 0949    sodium chloride 0.9 % infusion 40 mL, 40 mL, Intravenous, PRN, Anna Marie Alas MD    traZODone (DESYREL) tablet 50 mg, 50 mg, Oral, Nightly, Anna Marie Alas MD, 50 mg at 02/17/25 2220    Laboratory Results:   Lab Results   Component Value Date    GLUCOSE 88 02/19/2025    CALCIUM 8.7 02/19/2025     02/19/2025    K 4.2 02/19/2025    CO2 23.0 02/19/2025     (H) 02/19/2025    BUN 16 02/19/2025    CREATININE 0.67 02/19/2025    BCR 23.9 02/19/2025    ANIONGAP 13.0 02/19/2025     Lab Results   Component Value Date    WBC 15.62 (H) 02/19/2025    HGB 14.1 02/19/2025    HCT 44.3 02/19/2025    MCV 96.3 02/19/2025     (L) 02/19/2025     Lab Results   Component Value Date    CHOL 140 02/18/2025    CHOL 167 04/25/2024     Lab Results   Component Value Date    HDL 44 02/18/2025    HDL 39 (L) 04/25/2024     Lab Results   Component Value Date    LDL 81 02/18/2025     (H) 04/25/2024     Lab Results   Component Value Date    TRIG 79 02/18/2025    TRIG 86 04/25/2024     Lab Results   Component Value Date    HGBA1C 5.70 (H) 02/18/2025     Lab Results   Component Value Date    INR 1.07 04/27/2023    PROTIME 11.6 04/27/2023     No results found for: \"FOLATE\"  No results found for: \"OTSHOIPZ04\"    MRI Brain Without Contrast    Result Date: 2/17/2025  MRI BRAIN WO CONTRAST Date of Exam: 2/17/2025 11:53 AM EST Indication: New onset seizure activity.  " Comparison: CT earlier the same day. Technique:  Routine multiplanar/multisequence sequence images of the brain were obtained without contrast administration. Findings: A small late acute or early subacute lacunar infarct is noted within the subcortical white matter of the left posterior frontal lobe. Several additional areas of increased diffusion signal are present, without distinct ADC correlate, such as within the central yudy. Midline structures are normal and the craniocervical junction appears satisfactory. Age-related changes are present with relatively advanced generalized volume loss and typical pontine and periventricular leukomalacia. There are also numerous chronic areas of prior lacunar infarct, within bilateral basal ganglia and the left yudy. There is otherwise no evidence of intracranial hemorrhage, mass or mass effect. There is ex vacuo prominence of the ventricles and sulci. Dedicated coronal  evaluation of the mesial temporal lobe structures demonstrates proportionate atrophy of both hippocampal structures, without distinct additional focal hippocampal asymmetry. The orbits are normal. The paranasal sinuses are grossly clear. Intracranial arterial flow voids appear maintained.     Impression: Impression: Tiny area of of possible acute or early subacute lacunar infarct seen within the subcortical white matter of the left posterior frontal lobe. No additional acute intracranial findings. Advanced generalized volume loss and chronic white matter changes are  present. Mesial temporal lobe structures demonstrate proportionate atrophy without additional focal hippocampal asymmetry. Electronically Signed: Thiago Hernandez MD  2/17/2025 12:58 PM EST  Workstation ID: [id number]        Assessment / Plan   Brief Patient Summary:  Rolanda Damon is a 74 y.o. female with past medical history of dementia without behavior disturbances, CVA, seizures?  And TIA who presented to [facility] after witnessed seizure-like activity  at nursing facility.      Plan:   Seizure-like activity  Dementia without behavioral disturbances  UTI  Decrease Depakote 125 mg 3 times daily.  If patient becomes too lethargic can decrease to twice daily.  EEG with no ongoing seizure activity.  Cerebral dysfunction noted.  Continue seizure precautions  Continue delirium/fall precautions  Antibiotics per hospitalist team  N.p.o. till speech evaluation.  Neurochecks every shift     Multiple acute/subacute infarcts  Previous CVAs  MRI consistent with multiple subacute to acute lacunar infarcts.  Chronic white matter changes noted.  Patient's family requested for no further testing to be done at this time, given patient's underlying chronic disorders.  SLP on hold due to patient mentation.  Patient's family would like to hold off on any other workup for strokes.        Inpatient hospice consult.  Per daughter patient is hospice at her living facility.  Plan is to treat seizures and UTI at this time and monitor mentation.       I have discussed the above with the patient, bedside RN Dr. Christianson   Time spent with patient: 35 minutes in face-to-face evaluation and management of the patient.    Copied text in this note has been reviewed and is accurate as of 02/20/25.     Madeleine Marquez, REJI

## 2025-02-20 NOTE — PLAN OF CARE
Goal Outcome Evaluation:   VSS. Pt took oral medications this AM. Family updated with care plan at the bedside this afternoon. NSR on tele monitor. Pt resting in bed at this time. Denies any needs at this time.

## 2025-02-20 NOTE — CASE MANAGEMENT/SOCIAL WORK
Continued Stay Note  [facility]     Patient Name: Rolanda Damon  MRN: [id number]  Today's Date: 2/20/2025    Admit Date: 2/17/2025    Plan: [facility] Hospice   Discharge Plan       Row Name 02/20/25 1220       Plan    Plan [facility]ospice    Plan Comments I spoke with patient’s caregiver at my bedside. Patient lives at LTC bed at [facility]. Mrs. Damon discharge plan is back to [facility] with hospice. CM called Brittany at [facility], Patient is a LTC resident, and she can return back when discharge. CM explained that she may be ready for discharge tomorrow. CM spoke with Randi, with hospice and updated her. CM will follow and help aide when possible.    Final Discharge Disposition Code 04 - intermediate care facility                   Discharge Codes    No documentation.                 Expected Discharge Date and Time       Expected Discharge Date Expected Discharge Time    Feb 21, 2025               Poppy Hedrick RN

## 2025-02-20 NOTE — PROGRESS NOTES
[facility] Medicine Services  PROGRESS NOTE    Patient Name: Rolanda Damon  : 1950  MRN: [id number]    Date of Admission: 2025  Primary Care Physician: Provider, No Known    Subjective   Subjective     CC:  Follow-up for seizure    HPI:  Patient seen and examined this morning.  Continues to do well.  Per her sitter/caregiver at bedside, she has been eating too well today.  Patient is pleasant and smiling but nonverbal.  No witnessed seizure activity    Objective   Objective     Vital Signs:   Temp:  [96.5 °F (35.8 °C)-97.9 °F (36.6 °C)] 97.6 °F (36.4 °C)  Heart Rate:  [57-82] 57  Resp:  [16-18] 18  BP: ()/(57-83) 105/73  Flow (L/min) (Oxygen Therapy):  [2] 2     Physical Exam:  General: Awake, nonverbal  Head: Atraumatic and normocephalic  Eyes: No Icterus. No pallor  Ears:  Ears appear intact with no abnormalities noted  Throat: No oral lesions, no thrush  Neck: Supple, trachea midline  Lungs: Left lower lobe coarse crackles with bilateral wheezing.    Heart:  Normal S1 and S2, no murmur, no gallop, No JVD, no lower extremity swelling  Abdomen:  Soft, no tenderness, no organomegaly, normal bowel sounds, no organomegaly  Extremities: pulses equal bilaterally  Skin: No bleeding, bruising or rash, normal skin turgor and elasticity  Neurologic: Cranial nerves appear intact with no evidence of facial asymmetry, normal motor and sensory functions in all 4 extremities  Psych: Completely disoriented    Results Reviewed:  LAB RESULTS:      Lab 25  0430 25  2351 25  2017 25  1549 25  0839 25  0355 25  1035 25  0804 25  0630   WBC 15.62*  --  8.77  --   --  4.90  --   --  4.14   HEMOGLOBIN 14.1  --  13.6  --   --  12.9  --   --  14.4   HEMATOCRIT 44.3  --  42.4  --   --  39.7  --   --  42.9   PLATELETS 122*  --  165  --   --  115*  --   --  118*   NEUTROS ABS 13.64*  --  7.67*  --   --   --   --   --  2.55   IMMATURE GRANS  (ABS) 0.08*  --  0.07*  --   --   --   --   --  0.17*   LYMPHS ABS 1.25  --  0.73  --   --   --   --   --  0.99   MONOS ABS 0.61  --  0.23  --   --   --   --   --  0.31   EOS ABS 0.00  --  0.04  --   --   --   --   --  0.07   MCV 96.3  --  95.3  --   --  95.2  --   --  91.1   PROCALCITONIN  --   --   --   --   --   --   --   --  0.05   LACTATE  --  1.2 2.2*  --  1.2  --  1.7  --  2.3*   HSTROP T  --   --  36* 29*  --   --   --  23* 23*         [facility] 02/19/25 0430 02/18/25 2017 02/18/25 1549 02/18/25  0355 02/17/25  0630   SODIUM 145  --  142 145 144   POTASSIUM 4.2  --  4.3  4.3 3.4* 3.9   CHLORIDE 109*  --  107 106 103   CO2 23.0  --  27.0 28.0 27.0   ANION GAP 13.0  --  8.0 11.0 14.0   BUN 16  --  16 17 22   CREATININE 0.67  --  0.59 0.49* 0.54*   EGFR 90.7  --  93.5 97.8 95.6   GLUCOSE 88  --  81 97 120*   CALCIUM 8.7  --  8.5* 8.6 9.0   MAGNESIUM 1.7 1.7  --  2.0 1.2*   PHOSPHORUS  --   --  3.1  --  3.7   HEMOGLOBIN A1C  --   --   --  5.70*  --    TSH  --   --   --   --  9.490*         Lab 02/19/25 0430 02/18/25 1549 02/17/25  0630   TOTAL PROTEIN 5.9* 5.3* 6.0   ALBUMIN 3.5 3.2* 3.5   GLOBULIN 2.4 2.1 2.5   ALT (SGPT) 7 7 8   AST (SGOT) 14 11 16   BILIRUBIN 0.7 0.5 0.6   ALK PHOS 87 80 75         Lab 02/18/25 2017 02/18/25  1549 02/17/25  0804 02/17/25  0630   PROBNP  --  224.4  --   --    HSTROP T 36* 29* 23* 23*         Lab 02/18/25  0355   CHOLESTEROL 140   LDL CHOL 81   HDL CHOL 44   TRIGLYCERIDES 79             Lab 02/18/25  1945   PH, ARTERIAL 7.395   PCO2, ARTERIAL 44.3   PO2 ART 33.6*   FIO2 100   HCO3 ART 27.1*   BASE EXCESS ART 1.7   CARBOXYHEMOGLOBIN 0.9     Brief Urine Lab Results  (Last result in the past 365 days)        Color   Clarity   Blood   Leuk Est   Nitrite   Protein   CREAT   Urine HCG        02/19/25 0957 Yellow   Clear   Small (1+)   Negative   Negative   30 mg/dL (1+)                   Microbiology Results Abnormal       Procedure Component Value - Date/Time    Urine Culture -  Urine, Straight Cath [[id number]]  (Abnormal) Collected: 02/17/25 0833    Lab Status: Final result Specimen: Urine from Straight Cath Updated: 02/19/25 0918     Urine Culture >100,000 CFU/mL Aerococcus urinae     Comment:   Aerococcus spp. are usually susceptible to beta-lactams and vancomycin. Resistance has been reported to the fluoroquinolones. Routine susceptibility testing is not recommended. Please call lab to request further workup.       Narrative:      Colonization of the urinary tract without infection is common. Treatment is discouraged unless the patient is symptomatic, pregnant, or undergoing an invasive urologic procedure.            FL Video Swallow With Speech Single Contrast    Result Date: 2/19/2025  FL VIDEO SWALLOW W SPEECH SINGLE-CONTRAST Date of Exam: 2/19/2025 1:44 PM EST Indication: dysphagia.   Comparison: None available. Technique:   The speech pathologist administered food and/or liquid mixed with barium to the patient with cine/video imaging.  Imaging assistance was provided to the speech pathologist and an image was saved. Fluoroscopic Time: 54 seconds Number of Images: 4 associated fluoroscopic loops were saved Findings: No aspiration was seen during fluoroscopic guided modified barium swallowing series. Please see speech therapy report for full details and recommendations.     Impression: Impression: Fluoroscopy provided for a modified barium swallow. No aspiration was seen during swallowing evaluation. Please see speech therapy report for full details and recommendations. Report dictated by: Maribel Valencia PA-c  I have personally reviewed this case and agree with the findings above: Electronically Signed: Alonzo Campos MD  2/19/2025 5:51 PM EST  Workstation ID: [id number]    XR Chest 1 View    Result Date: 2/18/2025  XR CHEST 1 VW Date of Exam: 2/18/2025 7:47 PM EST Indication: rapid Comparison: Chest radiograph 2/17/2025 Findings: Mediastinum: Cardiac silhouette appears unchanged and normal  in size Lungs: Similar elevated left hemidiaphragm with left basal opacities, likely representing atelectasis. Right lung appears clear. Pleura: No pleural effusion or pneumothorax. Bones and soft tissues: No acute, displaced fracture seen.     Impression: Impression: Similar elevated left hemidiaphragm with left basal opacities, likely representing atelectasis. Otherwise, no acute intrathoracic findings. Electronically Signed: Ino Read  2/18/2025 8:16 PM EST  Workstation ID: [id number]     Results for orders placed during the hospital encounter of 04/24/24    Adult Transthoracic Echo Complete W/ Cont if Necessary Per Protocol (With Agitated Saline)    Interpretation Summary    Left ventricular ejection fraction appears to be 46 - 50%.    Left ventricular wall thickness is consistent with hypertrophy.    Mild aortic valve regurgitation is present.    Saline test results are negative.      Current medications:  Scheduled Meds:aspirin, 325 mg, Oral, Daily  atorvastatin, 40 mg, Oral, Nightly  busPIRone, 7.5 mg, Oral, BID  [Held by provider] Divalproex Sodium, 250 mg, Oral, Q8H  donepezil, 5 mg, Oral, Nightly  Ferrous Sulfate, 300 mg, Oral, Daily  heparin (porcine), 5,000 Units, Subcutaneous, Q8H  levothyroxine, 75 mcg, Oral, Daily  memantine, 10 mg, Oral, BID  mirtazapine, 7.5 mg, Oral, Nightly  pantoprazole, 40 mg, Intravenous, Q AM  piperacillin-tazobactam, 3.375 g, Intravenous, Q8H  sodium chloride, 10 mL, Intravenous, Q12H  traZODone, 50 mg, Oral, Nightly      Continuous Infusions:     PRN Meds:.  acetaminophen **OR** acetaminophen **OR** acetaminophen    senna-docusate sodium **AND** polyethylene glycol **AND** bisacodyl **AND** bisacodyl    Calcium Replacement - Follow Nurse / BPA Driven Protocol    Magnesium Standard Dose Replacement - Follow Nurse / BPA Driven Protocol    midazolam    ondansetron ODT **OR** ondansetron    Phosphorus Replacement - Follow Nurse / BPA Driven Protocol    Potassium Replacement  - Follow Nurse / BPA Driven Protocol    sodium chloride    sodium chloride    sodium chloride    Assessment & Plan   Assessment & Plan     Active Hospital Problems    Diagnosis  POA    **Seizure [R56.9]  Yes    Acute UTI (urinary tract infection) [N39.0]  Unknown    Elevated TSH [R79.89]  Unknown    Other hyperlipidemia [E78.49]  Yes    Cerebrovascular accident (CVA) due to bilateral embolism of middle cerebral arteries [I63.413]  Yes    History of breast cancer [Z85.3]  Not Applicable    Thrombocytopenia [D69.6]  Yes    Type 2 diabetes mellitus [E11.9]  Yes    Dementia without behavioral disturbance [F03.90]  Yes      Resolved Hospital Problems   No resolved problems to display.        Brief Hospital Course to date:  Rolanda Damon is a 74 y.o. female with PMH significant for HTN, HLD, diet-controlled DMII,  hypothyroidism (no longer on medication with baseline elevated TSH), breast cancer s/p bilateral mastectomy, CVA, seizure disorder (has been off Keppra for 3 years due to oversedation) and dementia (minimally conversant at baseline) who presented from her nursing facility with seizure.      Goals of care  Patient with advanced dementia and poor oral intake at baseline  She was at the nursing home with outpatient hospice  Presented to the hospital with UTI and seizure-like activity  Discussed the goals of care with her daughter-in-law who is the guardian.  Plan to treat UTI and seizure activity and monitor her clinical response and oral intake over the next 48 to 72 hours.  If no clinical meaningful improvement noted, she might be candidate for inpatient hospice    Acute hypoxia  Aspiration pneumonia  Acute urinary tract infection with Aerococcus urinae  Event of witnessed aspiration.  Chest x-ray with evidence of left lower lobe pneumonia  Continue IV Zosyn for both UTI and pneumonia  Discontinue vancomycin/MRSA negative  Mucinex, DuoNebs and supportive care    Seizure  H/o seizure disorder  Patient presented to  the hospital, brought from nursing home for witnessed seizure  She has history of seizure disorder has been off Keppra for 3 years because of oversedation  Given dose of IV Keppra in the ED.  Previously intolerant to Keppra because of increased sedation and Keppra discontinued  Seen by neurology team and was started on Depakote loading and maintenance.  Currently on low-dose Depakote 125 mg twice daily down from 250 mg 3 times daily because of increased sedation      Acute ischemic stroke  MRI brain showed multiple acute to subacute lacunar infarctions within the subcortical white matter of the left posterior frontal lobe.   Currently on aspirin  Defer CTA head and neck per family request  Neurology team followed     Hypothyroidism  TSH elevated at 9.4 with normal free T4  Levothyroxine dose increased to 75 mics daily  Need repeat TSH in 6 weeks     Dementia  Continue home meds: Donepezil and memantine     Type 2 diabetes  Hypertension  Diet controlled and not on medications      Expected Discharge Location and Transportation: Back to nursing home  Expected Discharge   Expected Discharge Date: 2/21/2025; Expected Discharge Time:      VTE Prophylaxis:  Pharmacologic & mechanical VTE prophylaxis orders are present.         AM-PAC 6 Clicks Score (PT): 6 (02/19/25 0951)    CODE STATUS:   Code Status and Medical Interventions: No CPR (Do Not Attempt to Resuscitate); Limited Support; No intubation (DNI), No cardioversion, No dialysis, No vasopressors   Ordered at: 02/17/25 0884     Medical Intervention Limits:    No intubation (DNI)    No cardioversion    No dialysis    No vasopressors     Level Of Support Discussed With:    Health Care Surrogate     Code Status (Patient has no pulse and is not breathing):    No CPR (Do Not Attempt to Resuscitate)     Medical Interventions (Patient has pulse or is breathing):    Limited Support       Seth Christianson MD  02/20/25

## 2025-02-21 VITALS
BODY MASS INDEX: 22.07 KG/M2 | TEMPERATURE: 98.4 F | DIASTOLIC BLOOD PRESSURE: 74 MMHG | OXYGEN SATURATION: 95 % | HEART RATE: 60 BPM | HEIGHT: 62 IN | SYSTOLIC BLOOD PRESSURE: 134 MMHG | WEIGHT: 119.93 LBS | RESPIRATION RATE: 18 BRPM

## 2025-02-21 LAB
ANION GAP SERPL CALCULATED.3IONS-SCNC: 9 MMOL/L (ref 5–15)
BACTERIA SPEC AEROBE CULT: NO GROWTH
BASOPHILS # BLD AUTO: 0.02 10*3/MM3 (ref 0–0.2)
BASOPHILS NFR BLD AUTO: 0.4 % (ref 0–1.5)
BUN SERPL-MCNC: 15 MG/DL (ref 8–23)
BUN/CREAT SERPL: 21.1 (ref 7–25)
CALCIUM SPEC-SCNC: 8.5 MG/DL (ref 8.6–10.5)
CHLORIDE SERPL-SCNC: 108 MMOL/L (ref 98–107)
CO2 SERPL-SCNC: 29 MMOL/L (ref 22–29)
CREAT SERPL-MCNC: 0.71 MG/DL (ref 0.57–1)
DEPRECATED RDW RBC AUTO: 51.2 FL (ref 37–54)
EGFRCR SERPLBLD CKD-EPI 2021: 88.2 ML/MIN/1.73
EOSINOPHIL # BLD AUTO: 0.08 10*3/MM3 (ref 0–0.4)
EOSINOPHIL NFR BLD AUTO: 1.5 % (ref 0.3–6.2)
ERYTHROCYTE [DISTWIDTH] IN BLOOD BY AUTOMATED COUNT: 14.8 % (ref 12.3–15.4)
GLUCOSE SERPL-MCNC: 120 MG/DL (ref 65–99)
HCT VFR BLD AUTO: 35.9 % (ref 34–46.6)
HGB BLD-MCNC: 11.6 G/DL (ref 12–15.9)
IMM GRANULOCYTES # BLD AUTO: 0.03 10*3/MM3 (ref 0–0.05)
IMM GRANULOCYTES NFR BLD AUTO: 0.6 % (ref 0–0.5)
LYMPHOCYTES # BLD AUTO: 0.46 10*3/MM3 (ref 0.7–3.1)
LYMPHOCYTES NFR BLD AUTO: 8.8 % (ref 19.6–45.3)
MCH RBC QN AUTO: 31 PG (ref 26.6–33)
MCHC RBC AUTO-ENTMCNC: 32.3 G/DL (ref 31.5–35.7)
MCV RBC AUTO: 96 FL (ref 79–97)
MONOCYTES # BLD AUTO: 0.32 10*3/MM3 (ref 0.1–0.9)
MONOCYTES NFR BLD AUTO: 6.1 % (ref 5–12)
NEUTROPHILS NFR BLD AUTO: 4.3 10*3/MM3 (ref 1.7–7)
NEUTROPHILS NFR BLD AUTO: 82.6 % (ref 42.7–76)
NRBC BLD AUTO-RTO: 0 /100 WBC (ref 0–0.2)
PLATELET # BLD AUTO: 87 10*3/MM3 (ref 140–450)
PMV BLD AUTO: 11.3 FL (ref 6–12)
POTASSIUM SERPL-SCNC: 3 MMOL/L (ref 3.5–5.2)
RBC # BLD AUTO: 3.74 10*6/MM3 (ref 3.77–5.28)
SODIUM SERPL-SCNC: 146 MMOL/L (ref 136–145)
WBC NRBC COR # BLD AUTO: 5.21 10*3/MM3 (ref 3.4–10.8)

## 2025-02-21 PROCEDURE — 85025 COMPLETE CBC W/AUTO DIFF WBC: CPT | Performed by: INTERNAL MEDICINE

## 2025-02-21 PROCEDURE — 25010000002 PIPERACILLIN SOD-TAZOBACTAM PER 1 G: Performed by: STUDENT IN AN ORGANIZED HEALTH CARE EDUCATION/TRAINING PROGRAM

## 2025-02-21 PROCEDURE — 25010000002 HEPARIN (PORCINE) PER 1000 UNITS: Performed by: INTERNAL MEDICINE

## 2025-02-21 PROCEDURE — 99239 HOSP IP/OBS DSCHRG MGMT >30: CPT | Performed by: NURSE PRACTITIONER

## 2025-02-21 PROCEDURE — 92526 ORAL FUNCTION THERAPY: CPT

## 2025-02-21 PROCEDURE — 80048 BASIC METABOLIC PNL TOTAL CA: CPT | Performed by: INTERNAL MEDICINE

## 2025-02-21 RX ORDER — BUSPIRONE HYDROCHLORIDE 7.5 MG/1
7.5 TABLET ORAL 2 TIMES DAILY
Start: 2025-02-21

## 2025-02-21 RX ORDER — POTASSIUM CHLORIDE 750 MG/1
40 CAPSULE, EXTENDED RELEASE ORAL 2 TIMES DAILY
Start: 2025-02-21 | End: 2025-02-22

## 2025-02-21 RX ORDER — POTASSIUM CHLORIDE 1.5 G/1.58G
40 POWDER, FOR SOLUTION ORAL EVERY 4 HOURS
Status: DISCONTINUED | OUTPATIENT
Start: 2025-02-21 | End: 2025-02-21 | Stop reason: HOSPADM

## 2025-02-21 RX ORDER — DIVALPROEX SODIUM 125 MG/1
125 CAPSULE, COATED PELLETS ORAL EVERY 12 HOURS SCHEDULED
Start: 2025-02-21

## 2025-02-21 RX ORDER — FERROUS SULFATE 300 MG/5ML
300 LIQUID (ML) ORAL DAILY
Start: 2025-02-21

## 2025-02-21 RX ADMIN — ASPIRIN 325 MG: 325 TABLET ORAL at 10:00

## 2025-02-21 RX ADMIN — PANTOPRAZOLE SODIUM 40 MG: 40 INJECTION, POWDER, FOR SOLUTION INTRAVENOUS at 05:41

## 2025-02-21 RX ADMIN — DIVALPROEX SODIUM 125 MG: 125 CAPSULE ORAL at 10:00

## 2025-02-21 RX ADMIN — MEMANTINE HYDROCHLORIDE 10 MG: 10 TABLET, FILM COATED ORAL at 10:01

## 2025-02-21 RX ADMIN — MINERAL SUPPLEMENT IRON 300 MG / 5 ML STRENGTH LIQUID 100 PER BOX UNFLAVORED 300 MG: at 10:02

## 2025-02-21 RX ADMIN — BUSPIRONE HYDROCHLORIDE 7.5 MG: 15 TABLET ORAL at 10:01

## 2025-02-21 RX ADMIN — LEVOTHYROXINE SODIUM 75 MCG: 0.07 TABLET ORAL at 10:00

## 2025-02-21 RX ADMIN — HEPARIN SODIUM 5000 UNITS: 5000 INJECTION INTRAVENOUS; SUBCUTANEOUS at 05:41

## 2025-02-21 RX ADMIN — POTASSIUM CHLORIDE 40 MEQ: 1.5 POWDER, FOR SOLUTION ORAL at 10:00

## 2025-02-21 RX ADMIN — PIPERACILLIN AND TAZOBACTAM 3.38 G: 3; .375 INJECTION, POWDER, LYOPHILIZED, FOR SOLUTION INTRAVENOUS at 03:04

## 2025-02-21 RX ADMIN — Medication 10 ML: at 10:01

## 2025-02-21 NOTE — THERAPY TREATMENT NOTE
Acute Care - Speech Language Pathology   Swallow Treatment Note [facility]     Patient Name: Rolanda Damon  : 1950  MRN: [id number]  Today's Date: 2025               Admit Date: 2025    Visit Dx:     ICD-10-CM ICD-9-CM   1. Acute UTI  N39.0 599.0   2. History of stroke  Z86.73 V12.54   3. Seizure  R56.9 780.39   4. Hypomagnesemia  E83.42 275.2   5. Oropharyngeal dysphagia  R13.12 787.22     Patient Active Problem List   Diagnosis    Dementia without behavioral disturbance    Type 2 diabetes mellitus    History of breast cancer    Personal history of transient ischemic attack (TIA), and cerebral infarction without residual deficits    Thrombocytopenia    Cerebrovascular accident (CVA) due to bilateral embolism of middle cerebral arteries    Other hyperlipidemia    Seizure    Acute UTI (urinary tract infection)    Elevated TSH     Past Medical History:   Diagnosis Date    Breast cancer     Dementia     TIA (transient ischemic attack)      History reviewed. No pertinent surgical history.    SLP Recommendation and Plan                                               Daily Summary of Progress (SLP): progress toward functional goals is good (25)               Treatment Assessment (SLP): continued, oral dysphagia, suspected, pharyngeal dysphagia, toleration of diet (25)     Plan for Continued Treatment (SLP): continue treatment per plan of care (25)                SWALLOW EVALUATION (Last 72 Hours)       SLP Adult Swallow Evaluation       Row Name 25 0920 25 1345 25 09             Rehab Evaluation    Document Type therapy note (daily note)  -SM evaluation  -SM re-evaluation  -SM      Subjective Information no complaints  -SM no complaints  -SM no complaints  -SM      Patient Observations alert;cooperative  -SM alert;cooperative  -SM alert;cooperative  -SM      Patient Effort good  -SM good  -SM good  -SM         General Information    Current Method of  Nutrition pureed;nectar/syrup-thick liquids  - NPO  -SM NPO  -SM         Pain Scale: FACES Pre/Post-Treatment    Pain: FACES Scale, Pretreatment 0-->no hurt  -SM 0-->no hurt  -SM 0-->no hurt  -SM      Posttreatment Pain Rating 0-->no hurt  -SM 0-->no hurt  -SM 0-->no hurt  -SM         General Eating/Swallowing Observations    Respiratory Support Currently in Use -- -- nasal cannula  -      Eating/Swallowing Skills -- -- fed by SLP  -      Positioning During Eating -- -- upright in bed  -      Utensils Used -- -- spoon  -      Consistencies Trialed -- -- thin liquids;nectar/syrup-thick liquids;pureed  -SM         Clinical Swallow Eval    Oral Prep Phase -- -- impaired  -      Oral Transit -- -- impaired  -      Oral Residue -- -- impaired  -      Pharyngeal Phase -- -- suspected pharyngeal impairment  -         Oral Prep Concerns    Oral Prep Concerns -- -- oral holding;incomplete or weak lip closure around spoon;bolus removed from mouth manually  -      Oral Holding -- -- all consistencies  -      Incomplete or Weak Lip Closure Around Spoon -- -- all consistencies  -SM      Bolus Removed from Mouth Manually -- -- pudding  -         Oral Transit Concerns    Oral Transit Concerns -- -- delayed initiation of bolus transit;increased oral transit time  -      Delayed Intiation of Bolus Transit -- -- all consistencies  -      Increased Oral Transit Time -- -- all consistencies  -         Oral Residue Concerns    Oral Residue Concerns -- -- diffuse residue throughout oral cavity  -      Diffuse Residue Throughout Oral Cavity -- -- pudding  -         Pharyngeal Phase Concerns    Pharyngeal Phase Concerns -- -- throat clear  -      Throat Clear -- -- thin  -SM      Pharyngeal Phase Concerns, Comment -- -- No overt signs aspiration with tsp nectar-thick liquids though high risk aspiration with all PO intake. Sitter in room. Called POA to further discuss. She wanted time to process options  and requested calling SLP back later this morning.  -SM         MBS/VFSS    Utensils Used -- spoon;cup;straw  -SM --      Consistencies Trialed -- thin liquids;nectar/syrup-thick liquids;pureed  -SM --         MBS/VFSS Interpretation    Oral Prep Phase -- impaired oral phase of swallowing  -SM --      Oral Transit Phase -- impaired  -SM --      Oral Residue -- impaired  -SM --         Oral Preparatory Phase    Oral Preparatory Phase -- reduced lip closure around utensil;anterior loss;oral holding;other (see comments)  -SM --      Reduced Lip Closure Around Utensil -- all consistencies tested;secondary to reduced labial seal;secondary to reduced lingual strength;secondary to impaired cognitive status  -SM --      Anterior Loss -- thin liquids;nectar-thick liquids;secondary to reduced labial seal;secondary to reduced lingual strength;secondary to impaired cognitive status  -SM --      Oral Holding -- all consistencies tested;secondary to impaired cognitive status  -SM --      Oral Preparatory Phase, Comment -- Biting, not pulling liquid from straw  -SM --         Oral Transit Phase    Impaired Oral Transit Phase -- delayed initiation of bolus transit;increased A-P transit time;premature spillage of liquids into pharynx  -SM --      Delayed Initiation of bolus transit -- all consistencies tested;secondary to reduced lingual control;secondary to impaired cognitive status  -SM --      Increased A-P Transit Time -- all consistencies tested;secondary to reduced lingual control;secondary to impaired cognitive status  -SM --      Premature Spillage of Liquids into Pharynx -- thin liquids;nectar-thick liquids;secondary to reduced lingual control;secondary to impaired cognitive status  -SM --         Oral Residue    Impaired Oral Residue -- diffuse residue throughout oral cavity  -SM --      Diffuse Residue throughout Oral Cavity -- all consistencies tested;secondary to reduced lingual strength;secondary to reduced lingual  range of motion  -SM --      Response to Oral Residue -- partial residue clearance;with liquid wash  - --         Initiation of Pharyngeal Swallow    Initiation of Pharyngeal Swallow -- bolus in pyriform sinuses  - --      Pharyngeal Phase -- impaired pharyngeal phase of swallowing  - --      Pharyngeal Phase, Comment -- Mild diffuse coating. At risk aspiration 2' delayed swallow initiation + cognitive status. No aspiration observed. Did not test past tsp thin liquid 2' high risk aspiration with thin over course of a meal. Safest remains nectar-thick liquids, which is pt's baseline thickness. Per caregiver, pt has not been showing any aversion to thickened liquids.  -SM --         SLP Evaluation Clinical Impression    SLP Swallowing Diagnosis -- mod-severe;oral dysphagia;mild;pharyngeal dysphagia  - mod-severe;oral dysphagia;suspected pharyngeal dysphagia  -      Functional Impact -- risk of aspiration/pneumonia;risk of malnutrition;risk of dehydration  - risk of aspiration/pneumonia;risk of malnutrition;risk of dehydration  -      Rehab Potential/Prognosis, Swallowing -- re-evaluate goals as necessary  - re-evaluate goals as necessary  -      Swallow Criteria for Skilled Therapeutic Interventions Met -- demonstrates skilled criteria  - --         SLP Treatment Clinical Impressions    Treatment Assessment (SLP) continued;oral dysphagia;suspected;pharyngeal dysphagia;toleration of diet  - -- --      Daily Summary of Progress (SLP) progress toward functional goals is good  - -- --      Plan for Continued Treatment (SLP) continue treatment per plan of care  - -- --      Care Plan Review evaluation/treatment results reviewed;care plan/treatment goals reviewed;risks/benefits reviewed;current/potential barriers reviewed;patient/other agree to care plan  - -- --      Care Plan Review, Other Participant(s) caregiver  - -- --         Recommendations    Therapy Frequency (Swallow) -- 5 days per  week  -SM --      Predicted Duration Therapy Intervention (Days) -- 1 week  -SM --      SLP Diet Recommendation -- puree;nectar thick liquids  -SM NPO;other (see comments)  if pursues comfort diet, suggest puree/nectar-thick liquids.  -      Recommended Diagnostics -- -- other (see comments)  will await call back from legal gaurdian for ? MBS vs safest/most comfortable PO diet based on this assessment.  -SM      Recommended Precautions and Strategies -- general aspiration precautions;assist with feeding  -SM upright posture during/after eating;small bites of food and sips of liquid;general aspiration precautions;assist with feeding  -SM      Oral Care Recommendations -- Oral Care BID/PRN;Suction toothbrush  -SM Oral Care BID/PRN;Suction toothbrush  -SM      SLP Rec. for Method of Medication Administration -- meds crushed;with puree  or ice cream  - meds via alternate route  if moved PO diet, suggest crushed in puree or ice cream.  -SM      Monitor for Signs of Aspiration -- yes;notify SLP if any concerns  -SM yes;notify SLP if any concerns  -SM      Anticipated Discharge Disposition (SLP) -- skilled nursing facility  - skilled nursing facility  -                User Key  (r) = Recorded By, (t) = Taken By, (c) = Cosigned By      Initials Name Effective Dates    Maine Fernandez MS CCC-SLP 01/20/25 -                     EDUCATION  The patient has been educated in the following areas:   Dysphagia (Swallowing Impairment) Modified Diet Instruction.        SLP GOALS       Row Name 02/21/25 0920 02/19/25 [id number]          (LTG) Patient will demonstrate functional swallow for    Diet Texture (Demonstrate functional swallow) mechanical ground textures  -SM mechanical ground textures  -SM     Liquid viscosity (Demonstrate functional swallow) nectar/ mildly thick liquids  -SM nectar/ mildly thick liquids  -SM     [place] (Demonstrate functional swallow) with 1:1 assist/ supervision  -SM with 1:1 assist/  supervision  -SM     Time Frame (Demonstrate functional swallow) 1 week  -SM 1 week  -SM     Progress/Outcomes (Demonstrate functional swallow) goal partially met  -SM --        (STG) Patient will tolerate trials of    Consistencies Trialed (Tolerate trials) pureed textures;nectar/ mildly thick liquids  -SM pureed textures;nectar/ mildly thick liquids  -SM     Desired Outcome (Tolerate trials) without signs/symptoms of aspiration;with adequate oral prep/transit/clearance  -SM without signs/symptoms of aspiration;with adequate oral prep/transit/clearance  -SM     [place] (Tolerate trials) with 1:1 assist/ supervision  -SM with 1:1 assist/ supervision  -SM     Time Frame (Tolerate trials) 1 week  -SM 1 week  -SM     Progress/Outcomes (Tolerate trials) goal met  -SM --        (STG) Patient will tolerate therapeutic trials of    Consistencies Trialed (Tolerate therapeutic trials) mechanical ground textures  -SM mechanical ground textures  -SM     Desired Outcome (Tolerate therapeutic trials) with adequate oral prep/transit/clearance;without signs/symptoms of aspiration  -SM with adequate oral prep/transit/clearance;without signs/symptoms of aspiration  -SM     [place] (Tolerate therapeutic trials) with 1:1 assist/ supervision  -SM with 1:1 assist/ supervision  -SM     Time Frame (Tolerate therapeutic trials) 1 week  -SM 1 week  -SM     Progress/Outcomes (Tolerate therapeutic trials) goal ongoing  -SM --     Comment (Tolerate therapeutic trials) Too sleepy to target. Caregiver at the bedside. Discussed safe methods for trialing and advancing back to mechanical ground once at home. Caregiver showed good understanding of all discussed.  -SM --        (STG) Swallow 1    (STG) Swallow 1 If POA wishes to consider, as needed, for thirst or any aversion to diet, tolerate thin H2O between meals without s/s aspiration  -SM If POA wishes to consider, as needed, for thirst or any aversion to diet, tolerate thin H2O  between meals without s/s aspiration  -SM     [place] (Swallow Short Term Goal 1) with 1:1 assist/ supervision  -SM with 1:1 assist/ supervision  -SM     Time Frame (Swallow Short Term Goal 1) 1 week  -SM 1 week  -SM     Progress/Outcomes (Swallow Short Term Goal 1) goal ongoing  -SM --     Comment (Swallow Short Term Goal 1) Too sleepy to attempt. Education provided to caregiver regarding thin H2O between meals, especially if shows any signs aversion with thickened liquids.  -SM --               User Key  (r) = Recorded By, (t) = Taken By, (c) = Cosigned By      Initials Name Provider Type    Maine Fernandez MS CCC-SLP Speech and Language Pathologist                         Time Calculation:    Time Calculation- SLP       Row Name 02/21/25 0948             Time Calculation- SLP    SLP Start Time 0920  -      SLP Received On 02/21/25  -SM         Untimed Charges    59445-HR Treatment Swallow Minutes 30  -SM         Total Minutes    Untimed Charges Total Minutes 30  -SM       Total Minutes 30  -SM                User Key  (r) = Recorded By, (t) = Taken By, (c) = Cosigned By      Initials Name Provider Type    Maine Fernandez MS CCC-SLP Speech and Language Pathologist                    Therapy Charges for Today       Code Description Service Date Service Provider Modifiers Qty    [id number] HC ST TREATMENT SWALLOW 2 2/21/2025 Maine Eduardo MS CCC-SLP GN 1                 Maine Eduardo MS CCC-JONO  2/21/2025

## 2025-02-21 NOTE — DISCHARGE SUMMARY
[facility] Medicine Services  DISCHARGE SUMMARY    Patient Name: Rolanda Damon  : 1950  MRN: [id number]    Date of Admission: 2025  5:46 AM  Date of Discharge:  2025  Primary Care Physician: Provider, No Known    Consults       Date and Time Order Name Status Description    2025 10:51 AM Inpatient Neurology Consult General Completed             Hospital Course     Presenting Problem: f/u seizure     Active Hospital Problems    Diagnosis  POA    **Seizure [R56.9]  Yes    Acute UTI (urinary tract infection) [N39.0]  Unknown    Elevated TSH [R79.89]  Unknown    Other hyperlipidemia [E78.49]  Yes    Cerebrovascular accident (CVA) due to bilateral embolism of middle cerebral arteries [I63.413]  Yes    History of breast cancer [Z85.3]  Not Applicable    Thrombocytopenia [D69.6]  Yes    Type 2 diabetes mellitus [E11.9]  Yes    Dementia without behavioral disturbance [F03.90]  Yes      Resolved Hospital Problems   No resolved problems to display.          Hospital Course:  Rolanda Damon is a 74 y.o. female  with PMH significant for HTN, HLD, diet-controlled DMII,  hypothyroidism (no longer on medication with baseline elevated TSH), breast cancer s/p bilateral mastectomy, CVA, seizure disorder (has been off Keppra for 3 years due to oversedation) and dementia (minimally conversant at baseline) who presented from her nursing facility with seizure.       Goals of care  Patient with advanced dementia and poor oral intake at baseline  She was at the nursing home with outpatient hospice  Presented to the hospital with UTI and seizure-like activity  Discussed the goals of care with her daughter-in-law who is the guardian.  Plan to treat UTI and seizure activity and monitor her clinical response      Acute hypoxia  Aspiration pneumonia  Acute urinary tract infection with Aerococcus urinae  Event of witnessed aspiration.  Chest x-ray with evidence of left lower lobe  pneumonia  Continue IV Zosyn for both UTI and pneumonia, will switch over to Augmentin for 3 more days to complete 5 days of therapy   Discontinue vancomycin/MRSA negative  Mucinex, DuoNebs and supportive care  Blood cultures NGTD     Seizure  H/o seizure disorder  Patient presented to the hospital, brought from nursing home for witnessed seizure  She has history of seizure disorder has been off Keppra for 3 years because of oversedation  Given dose of IV Keppra in the ED.  Previously intolerant to Keppra because of increased sedation and Keppra discontinued  Seen by neurology team and was started on Depakote loading and maintenance.  Currently on low-dose Depakote 125 mg twice daily down from 250 mg 3 times daily because of increased sedation  Follow up with neurology as outpatient as needed         Acute ischemic stroke  MRI brain showed multiple acute to subacute lacunar infarctions within the subcortical white matter of the left posterior frontal lobe.   Currently on aspirin  Defer CTA head and neck per family request  Neurology team followed    Hypokalemia  -- K 3.0 today will get dose prior to dc and will need two more doses today at facility. Will need recheck in am      Hypothyroidism  TSH elevated at 9.4 with normal free T4  Need repeat TSH in 6 weeks after acute illness      Dementia  Continue home meds: Donepezil and memantine     Type 2 diabetes  Hypertension  Diet controlled and not on medications     Patient has remained clinically stable and will go back to facility with hospice to follow.       Discharge Follow Up Recommendations for outpatient labs/diagnostics:   Follow up with pcp or facility provider one week  Follow up with neurology as needed   Repeat TSH in 6 weeks     Day of Discharge     HPI:   Patient is sleeping in bed in NAD. Caregiver at bedside. Per nurse no acute events overnight per nursing. Ate well yesterday. Still sleeping a lot. Did respond to touch this am     Review of  Systems  Unable to obtain     Vital Signs:   Temp:  [98.1 °F (36.7 °C)-98.6 °F (37 °C)] 98.4 °F (36.9 °C)  Heart Rate:  [60-77] 60  Resp:  [16-18] 18  BP: (111-145)/() 134/74  Flow (L/min) (Oxygen Therapy):  [2] 2      Physical Exam:  Constitutional: No acute distress, drowsy , alert, thin chronically ill female   HENT: NCAT, mucous membranes moist  Respiratory: Clear to auscultation bilaterally, respiratory effort normal 2L 98%  Cardiovascular: RRR, no murmurs, rubs, or gallops  Gastrointestinal: Positive bowel sounds, soft, nontender, nondistended  Musculoskeletal: No bilateral ankle edema  Neurologic: can speak at times, understandable at times, alert to self.   Skin: No rashes      Pertinent  and/or Most Recent Results     LAB RESULTS:      Lab 02/21/25  0745 02/19/25  0430 02/18/25  2351 02/18/25 2017 02/18/25  0839 02/18/25  0355 02/17/25  1035 02/17/25  0630   WBC 5.21 15.62*  --  8.77  --  4.90  --  4.14   HEMOGLOBIN 11.6* 14.1  --  13.6  --  12.9  --  14.4   HEMATOCRIT 35.9 44.3  --  42.4  --  39.7  --  42.9   PLATELETS 87* 122*  --  165  --  115*  --  118*   NEUTROS ABS 4.30 13.64*  --  7.67*  --   --   --  2.55   IMMATURE GRANS (ABS) 0.03 0.08*  --  0.07*  --   --   --  0.17*   LYMPHS ABS 0.46* 1.25  --  0.73  --   --   --  0.99   MONOS ABS 0.32 0.61  --  0.23  --   --   --  0.31   EOS ABS 0.08 0.00  --  0.04  --   --   --  0.07   MCV 96.0 96.3  --  95.3  --  95.2  --  91.1   PROCALCITONIN  --   --   --   --   --   --   --  0.05   LACTATE  --   --  1.2 2.2* 1.2  --  1.7 2.3*         Lab 02/21/25  0745 02/19/25  0430 02/18/25 2017 02/18/25  1549 02/18/25  0355 02/17/25  0630   SODIUM 146* 145  --  142 145 144   POTASSIUM 3.0* 4.2  --  4.3  4.3 3.4* 3.9   CHLORIDE 108* 109*  --  107 106 103   CO2 29.0 23.0  --  27.0 28.0 27.0   ANION GAP 9.0 13.0  --  8.0 11.0 14.0   BUN 15 16  --  16 17 22   CREATININE 0.71 0.67  --  0.59 0.49* 0.54*   EGFR 88.2 90.7  --  93.5 97.8 95.6   GLUCOSE 120* 88  --  81  97 120*   CALCIUM 8.5* 8.7  --  8.5* 8.6 9.0   MAGNESIUM  --  1.7 1.7  --  2.0 1.2*   PHOSPHORUS  --   --   --  3.1  --  3.7   HEMOGLOBIN A1C  --   --   --   --  5.70*  --    TSH  --   --   --   --   --  9.490*         Lab 02/19/25 0430 02/18/25  1549 02/17/25  0630   TOTAL PROTEIN 5.9* 5.3* 6.0   ALBUMIN 3.5 3.2* 3.5   GLOBULIN 2.4 2.1 2.5   ALT (SGPT) 7 7 8   AST (SGOT) 14 11 16   BILIRUBIN 0.7 0.5 0.6   ALK PHOS 87 80 75         Lab 02/18/25 2017 02/18/25  1549 02/17/25  0804 02/17/25  0630   PROBNP  --  224.4  --   --    HSTROP T 36* 29* 23* 23*         Lab 02/18/25  0355   CHOLESTEROL 140   LDL CHOL 81   HDL CHOL 44   TRIGLYCERIDES 79             Lab 02/18/25  1945   PH, ARTERIAL 7.395   PCO2, ARTERIAL 44.3   PO2 ART 33.6*   FIO2 100   HCO3 ART 27.1*   BASE EXCESS ART 1.7   CARBOXYHEMOGLOBIN 0.9     Brief Urine Lab Results  (Last result in the past 365 days)        Color   Clarity   Blood   Leuk Est   Nitrite   Protein   CREAT   Urine HCG        02/19/25 0957 Yellow   Clear   Small (1+)   Negative   Negative   30 mg/dL (1+)                 Microbiology Results (last 10 days)       Procedure Component Value - Date/Time    Urine Culture - Urine, Urine, Clean Catch [[id number]]  (Normal) Collected: 02/19/25 0957    Lab Status: Preliminary result Specimen: Urine, Clean Catch Updated: 02/20/25 1153     Urine Culture No growth    MRSA Screen, PCR (Inpatient) - Swab, Nares [[id number]]  (Normal) Collected: 02/19/25 0626    Lab Status: Final result Specimen: Swab from Nares Updated: 02/19/25 0814     MRSA PCR Negative    Narrative:      The negative predictive value of this diagnostic test is high and should only be used to consider de-escalating anti-MRSA therapy. A positive result may indicate colonization with MRSA and must be correlated clinically.  MRSA Negative    Blood Culture - Blood, Hand, Left [[id number]]  (Normal) Collected: 02/18/25 2022    Lab Status: Preliminary result Specimen: Blood from Hand, Left  Updated: 02/20/25 2116     Blood Culture No growth at 2 days    Narrative:      Aerobic Bottle Only    Less than seven (7) mL's of blood was collected.  Insufficient quantity may yield false negative results.    Blood Culture - Blood, Hand, Right [[id number]]  (Normal) Collected: 02/18/25 2017    Lab Status: Preliminary result Specimen: Blood from Hand, Right Updated: 02/20/25 2116     Blood Culture No growth at 2 days    Narrative:      Aerobic Bottle Only    Less than seven (7) mL's of blood was collected.  Insufficient quantity may yield false negative results.    Urine Culture - Urine, Straight Cath [[id number]]  (Abnormal) Collected: 02/17/25 0833    Lab Status: Final result Specimen: Urine from Straight Cath Updated: 02/19/25 0918     Urine Culture >100,000 CFU/mL Aerococcus urinae     Comment:   Aerococcus spp. are usually susceptible to beta-lactams and vancomycin. Resistance has been reported to the fluoroquinolones. Routine susceptibility testing is not recommended. Please call lab to request further workup.       Narrative:      Colonization of the urinary tract without infection is common. Treatment is discouraged unless the patient is symptomatic, pregnant, or undergoing an invasive urologic procedure.    Blood Culture - Blood, Arm, Left [[id number]]  (Normal) Collected: 02/17/25 0630    Lab Status: Preliminary result Specimen: Blood from Arm, Left Updated: 02/21/25 0715     Blood Culture No growth at 4 days    Narrative:      Less than seven (7) mL's of blood was collected.  Insufficient quantity may yield false negative results.    Blood Culture - Blood, Arm, Right [[id number]]  (Normal) Collected: 02/17/25 0624    Lab Status: Preliminary result Specimen: Blood from Arm, Right Updated: 02/21/25 0715     Blood Culture No growth at 4 days    Narrative:      Less than seven (7) mL's of blood was collected.  Insufficient quantity may yield false negative results.    Urine Culture - Urine, Straight Cath  [[id number]] Collected: 02/17/25 0611    Lab Status: Final result Specimen: Urine from Straight Cath Updated: 02/18/25 1208     Urine Culture >100,000 CFU/mL Mixed Paola Isolated    Narrative:      Specimen contains mixed organisms of questionable pathogenicity suggestive of contamination. If symptoms persist, suggest recollection.  Colonization of the urinary tract without infection is common. Treatment is discouraged unless the patient is symptomatic, pregnant, or undergoing an invasive urologic procedure.            XR Chest 1 View    Result Date: 2/20/2025  XR CHEST 1 VW Date of Exam: 2/20/2025 2:16 PM EST Indication: Follow-up aspiration pneumonia Comparison: 2/18/2025 chest radiograph Findings: There is chronic elevation of the left hemidiaphragm and associated discoid atelectasis, unchanged. No new left lung disease, or right upper lung disease is seen. New on today's study, however, is patchy airspace disease in the right lower lung, consistent with pneumonia or aspiration. Heart and vasculature appear normal in size. No pneumothorax or effusion is seen. Chronic deformity of the right proximal humerus is consistent with old healed trauma.     Impression: 1. Chronically elevated left hemidiaphragm. 2. New mild to moderate right lower lung pneumonia or aspiration. Electronically Signed: Alonzo Campos MD  2/20/2025 3:26 PM EST  Workstation ID: [id number]    FL Video Swallow With Speech Single Contrast    Result Date: 2/19/2025  FL VIDEO SWALLOW W SPEECH SINGLE-CONTRAST Date of Exam: 2/19/2025 1:44 PM EST Indication: dysphagia.   Comparison: None available. Technique:   The speech pathologist administered food and/or liquid mixed with barium to the patient with cine/video imaging.  Imaging assistance was provided to the speech pathologist and an image was saved. Fluoroscopic Time: 54 seconds Number of Images: 4 associated fluoroscopic loops were saved Findings: No aspiration was seen during fluoroscopic guided  modified barium swallowing series. Please see speech therapy report for full details and recommendations.     Impression: Fluoroscopy provided for a modified barium swallow. No aspiration was seen during swallowing evaluation. Please see speech therapy report for full details and recommendations. Report dictated by: Maribel Valencia PA-c  I have personally reviewed this case and agree with the findings above: Electronically Signed: Alonzo Campos MD  2/19/2025 5:51 PM EST  Workstation ID: [id number]    XR Chest 1 View    Result Date: 2/18/2025  XR CHEST 1 VW Date of Exam: 2/18/2025 7:47 PM EST Indication: rapid Comparison: Chest radiograph 2/17/2025 Findings: Mediastinum: Cardiac silhouette appears unchanged and normal in size Lungs: Similar elevated left hemidiaphragm with left basal opacities, likely representing atelectasis. Right lung appears clear. Pleura: No pleural effusion or pneumothorax. Bones and soft tissues: No acute, displaced fracture seen.     Impression: Similar elevated left hemidiaphragm with left basal opacities, likely representing atelectasis. Otherwise, no acute intrathoracic findings. Electronically Signed: Ino Read  2/18/2025 8:16 PM EST  Workstation ID: [id number]    MRI Brain Without Contrast    Result Date: 2/17/2025  MRI BRAIN WO CONTRAST Date of Exam: 2/17/2025 11:53 AM EST Indication: New onset seizure activity.  Comparison: CT earlier the same day. Technique:  Routine multiplanar/multisequence sequence images of the brain were obtained without contrast administration. Findings: A small late acute or early subacute lacunar infarct is noted within the subcortical white matter of the left posterior frontal lobe. Several additional areas of increased diffusion signal are present, without distinct ADC correlate, such as within the central yudy. Midline structures are normal and the craniocervical junction appears satisfactory. Age-related changes are present with relatively advanced  generalized volume loss and typical pontine and periventricular leukomalacia. There are also numerous chronic areas of prior lacunar infarct, within bilateral basal ganglia and the left yudy. There is otherwise no evidence of intracranial hemorrhage, mass or mass effect. There is ex vacuo prominence of the ventricles and sulci. Dedicated coronal  evaluation of the mesial temporal lobe structures demonstrates proportionate atrophy of both hippocampal structures, without distinct additional focal hippocampal asymmetry. The orbits are normal. The paranasal sinuses are grossly clear. Intracranial arterial flow voids appear maintained.     Impression: Tiny area of of possible acute or early subacute lacunar infarct seen within the subcortical white matter of the left posterior frontal lobe. No additional acute intracranial findings. Advanced generalized volume loss and chronic white matter changes are  present. Mesial temporal lobe structures demonstrate proportionate atrophy without additional focal hippocampal asymmetry. Electronically Signed: Thiago Hernandez MD  2/17/2025 12:58 PM EST  Workstation ID: [id number]    EEG    Result Date: 2/17/2025  History: 74 y old female Procedure:  A 21 Channel digital electroencephalogram was performed. The 10/20 International System of electrode placement was used and both Bipolar and referential electrode montages were monitored. EEG Description: Background is continuous and symmetric. It consists of 2-5 Hz generalized slow delta and theta wave activity. Frequent waxing and waning 1-2 Hz sharp 2-3 phasic  Generalized periodic discharges (GPDs) noted. Photic stimulation using a stepwise increase in photic frequency, results in no driving response or activation of epileptiform activity. EEG Interpretation: Diffuse slow background. 2. Frequent triphasic Generalized periodic Discharges (GPDs). Clinical correlation: GPDs suggest cerebral grey matter dysfunction. They are nonspecific and  can be seen after anoxic injury or toxic metabolic. Clinical correlation is recommended     CT Head Without Contrast    Result Date: 2/17/2025  CT HEAD WO CONTRAST Date of Exam: 2/17/2025 6:47 AM EST Indication: sz. Comparison: CT scan of the head 4/24/2024 Technique: Axial CT images were obtained of the head without contrast administration.  Automated exposure control and iterative construction methods were used. Findings: There is mild diffuse generalized atrophy. There are low-attenuation areas in the periventricular white matter consistent with chronic microvascular ischemic change. There is an old lacunar infarct in the left periventricular corona radiata. There is no mass, mass effect or midline shift. There are no abnormal extra-axial fluid collections or areas of acute hemorrhage. The paranasal sinuses are clear. The mastoid air cells are clear.     Impression: Atrophy and chronic microvascular ischemic change. No acute intracranial process. Electronically Signed: Brandon Arevalo MD  2/17/2025 6:53 AM EST  Workstation ID: [id number]    XR Chest 1 View    Result Date: 2/17/2025  XR CHEST 1 VW Date of Exam: 2/17/2025 5:50 AM EST Indication: Weak/Dizzy/AMS triage protocol Comparison: Chest radiograph April 24, 2024 Findings: The heart size and pulmonary vascular markings are normal. There is tortuosity of the thoracic aorta. There is elevation of left hemidiaphragm. The lungs are clear.     Impression: No active disease. Electronically Signed: Brandon Arevalo MD  2/17/2025 6:26 AM EST  Workstation ID: [id number]             Results for orders placed during the hospital encounter of 04/24/24    Adult Transthoracic Echo Complete W/ Cont if Necessary Per Protocol (With Agitated Saline)    Interpretation Summary    Left ventricular ejection fraction appears to be 46 - 50%.    Left ventricular wall thickness is consistent with hypertrophy.    Mild aortic valve regurgitation is present.    Saline test results are  negative.      Plan for Follow-up of Pending Labs/Results: potassium check in am   Pending Labs       Order Current Status    Blood Culture - Blood, Arm, Left Preliminary result    Blood Culture - Blood, Arm, Right Preliminary result    Blood Culture - Blood, Hand, Left Preliminary result    Blood Culture - Blood, Hand, Right Preliminary result    Urine Culture - Urine, Urine, Clean Catch Preliminary result          Discharge Details        Discharge Medications        New Medications        Instructions Start Date   amoxicillin-clavulanate 875-125 MG per tablet  Commonly known as: AUGMENTIN   1 tablet, Oral, 2 Times Daily      Divalproex Sodium 125 MG capsule  Commonly known as: DEPAKOTE SPRINKLE   125 mg, Oral, Every 12 Hours Scheduled      Ferrous Sulfate 300 (60 Fe) MG/5ML solution   300 mg, Oral, Daily      potassium chloride 10 MEQ CR capsule  Commonly known as: MICRO-K   40 mEq, Oral, 2 Times Daily             Changes to Medications        Instructions Start Date   busPIRone 7.5 MG tablet  Commonly known as: BUSPAR  What changed:   medication strength  how much to take   7.5 mg, Oral, 2 Times Daily             Continue These Medications        Instructions Start Date   acetaminophen 500 MG tablet  Commonly known as: TYLENOL   500 mg, Every 6 Hours PRN      aspirin 325 MG tablet   325 mg, Daily      lactulose 10 GM/15ML solution  Commonly known as: CHRONULAC   20 g, Daily PRN      levothyroxine 75 MCG tablet  Commonly known as: SYNTHROID, LEVOTHROID   75 mcg, Daily      menthol-zinc oxide 0.44-20.625 % ointment ointment  Commonly known as: CALMOSEPTINE   Apply to buttocks topically every shift for skin integrity.      mirtazapine 7.5 MG tablet  Commonly known as: REMERON   7.5 mg, Nightly      omeprazole 20 MG capsule  Commonly known as: priLOSEC   20 mg, Every Morning Before Breakfast      senna 8.6 MG tablet  Commonly known as: SENOKOT   1 tablet, Daily      traZODone 50 MG tablet  Commonly known as:  DESYREL   50 mg, Nightly               Allergies   Allergen Reactions    Amoxicillin Unknown - Low Severity    Darvon [Propoxyphene] Unknown - Low Severity         Discharge Disposition:  Rehab Facility or Unit (DC - External)    Diet:  Hospital:  Diet Order   Procedures    Diet: Regular/House; Texture: Pureed (NDD 1); Fluid Consistency: Nectar Thick       Diet Instructions       Diet: Regular/House Diet; Pureed (NDD 1); Braman Thick      Discharge Diet: Regular/House Diet    Texture: Pureed (NDD 1)    Fluid Consistency: Nectar Thick    Diet: Regular/House; Texture: Pureed (NDD 1); Fluid Consistency: Nectar Thick:             Activity:  Activity Instructions       Activity as Tolerated      Measure Blood Pressure      Measure Weight              Restrictions or Other Recommendations:       CODE STATUS:    Code Status and Medical Interventions: No CPR (Do Not Attempt to Resuscitate); Limited Support; No intubation (DNI), No cardioversion, No dialysis, No vasopressors   Ordered at: 02/17/25 0843     Medical Intervention Limits:    No intubation (DNI)    No cardioversion    No dialysis    No vasopressors     Level Of Support Discussed With:    Health Care Surrogate     Code Status (Patient has no pulse and is not breathing):    No CPR (Do Not Attempt to Resuscitate)     Medical Interventions (Patient has pulse or is breathing):    Limited Support       Future Appointments   Date Time Provider Department Center   2/21/2025 11:30 AM MED 8  [facility]       Additional Instructions for the Follow-ups that You Need to Schedule       Discharge Follow-up with PCP   As directed       Currently Documented PCP:    Provider, No Known    PCP Phone Number:    None     Follow Up Details: follow up with pcp or facility provider one week                      Sondra No, REJI  02/21/25      Time Spent on Discharge:  I spent  45  minutes on this discharge activity which included: face-to-face encounter with the patient,  reviewing the data in the system, coordination of the care with the nursing staff as well as consultants, documentation, and entering orders.

## 2025-02-21 NOTE — PLAN OF CARE
Problem: Adult Inpatient Plan of Care  Goal: Absence of Hospital-Acquired Illness or Injury  Intervention: Identify and Manage Fall Risk  Recent Flowsheet Documentation  Taken 2/21/2025 1000 by Cheyenne Castillo RN  Safety Promotion/Fall Prevention:   activity supervised   assistive device/personal items within reach   clutter free environment maintained   fall prevention program maintained   lighting adjusted   nonskid shoes/slippers when out of bed   safety round/check completed  Taken 2/21/2025 0735 by Cheyenne Castillo RN  Safety Promotion/Fall Prevention:   assistive device/personal items within reach   activity supervised   clutter free environment maintained   fall prevention program maintained   lighting adjusted   nonskid shoes/slippers when out of bed   safety round/check completed  Intervention: Prevent Skin Injury  Recent Flowsheet Documentation  Taken 2/21/2025 1000 by Cheyenne Castillo RN  Skin Protection:   incontinence pads utilized   silicone foam dressing in place  Taken 2/21/2025 0735 by Cheyenne Castillo RN  Skin Protection:   incontinence pads utilized   silicone foam dressing in place  Intervention: Prevent Infection  Recent Flowsheet Documentation  Taken 2/21/2025 1000 by Cheyenne Castillo RN  Infection Prevention:   environmental surveillance performed   rest/sleep promoted   single patient room provided  Taken 2/21/2025 0735 by Cheyenne Castillo RN  Infection Prevention:   environmental surveillance performed   rest/sleep promoted   single patient room provided  Goal: Optimal Comfort and Wellbeing  Intervention: Provide Person-Centered Care  Recent Flowsheet Documentation  Taken 2/21/2025 1000 by Cheyenne Castillo RN  Trust Relationship/Rapport: care explained  Taken 2/21/2025 0735 by Cheyenne Castillo RN  Trust Relationship/Rapport: care explained     Problem: Violence Risk or Actual  Goal: Anger and Impulse Control  Intervention: Minimize Safety Risk  Recent Flowsheet Documentation  Taken 2/21/2025 1000 by Jonathan  ELAINA Quinn  Enhanced Safety Measures: bed alarm set  Taken 2/21/2025 0735 by Cheyenne Castillo RN  Enhanced Safety Measures: bed alarm set     Problem: Fall Injury Risk  Goal: Absence of Fall and Fall-Related Injury  Intervention: Identify and Manage Contributors  Recent Flowsheet Documentation  Taken 2/21/2025 1000 by Cheyenne Castillo RN  Medication Review/Management: medications reviewed  Taken 2/21/2025 0735 by Cheyenne Castillo RN  Medication Review/Management: medications reviewed  Intervention: Promote Injury-Free Environment  Recent Flowsheet Documentation  Taken 2/21/2025 1000 by Cheyenne Castillo RN  Safety Promotion/Fall Prevention:   activity supervised   assistive device/personal items within reach   clutter free environment maintained   fall prevention program maintained   lighting adjusted   nonskid shoes/slippers when out of bed   safety round/check completed  Taken 2/21/2025 0735 by Cheyenne Castillo RN  Safety Promotion/Fall Prevention:   assistive device/personal items within reach   activity supervised   clutter free environment maintained   fall prevention program maintained   lighting adjusted   nonskid shoes/slippers when out of bed   safety round/check completed     Problem: Skin Injury Risk Increased  Goal: Skin Health and Integrity  Intervention: Optimize Skin Protection  Recent Flowsheet Documentation  Taken 2/21/2025 1000 by Cheyenne Castillo RN  Activity Management: activity encouraged  Pressure Reduction Techniques:   frequent weight shift encouraged   heels elevated off bed   positioned off wounds   pressure points protected  Head of Bed (HOB) Positioning: HOB elevated  Pressure Reduction Devices:   heel offloading device utilized   positioning supports utilized   pressure-redistributing mattress utilized  Skin Protection:   incontinence pads utilized   silicone foam dressing in place  Taken 2/21/2025 0735 by Cheyenne Castillo RN  Activity Management: activity encouraged  Pressure Reduction Techniques:   frequent  weight shift encouraged   heels elevated off bed   positioned off wounds   pressure points protected  Head of Bed (HOB) Positioning: HOB elevated  Pressure Reduction Devices:   heel offloading device utilized   positioning supports utilized   pressure-redistributing mattress utilized  Skin Protection:   incontinence pads utilized   silicone foam dressing in place   Goal Outcome Evaluation:

## 2025-02-21 NOTE — NURSING NOTE
PIV removed. Tele monitor off. Nurse report given over the call to Kait. Caregiver updated at the bedside. Paperwork sent with pt. Pt discharged to the facility with transport team.

## 2025-02-21 NOTE — PROGRESS NOTES
Pt to dc to [facility] today via [facility] at 1130. Number to call report [phone] and fax the discharge summary to [phone]. Pharmacy is [facility] in [place].

## 2025-02-21 NOTE — CASE MANAGEMENT/SOCIAL WORK
Continued Stay Note  [facility]     Patient Name: Rolanda Damon  MRN: [id number]  Today's Date: 2/21/2025    Admit Date: 2/17/2025    Plan: [place] with Hospice   Discharge Plan       Row Name 02/21/25 1204       Plan    Final Discharge Disposition Code 04 - intermediate care facility                   Discharge Codes    No documentation.                 Expected Discharge Date and Time       Expected Discharge Date Expected Discharge Time    Feb 21, 2025               Poppy Hedrick RN

## 2025-02-21 NOTE — PLAN OF CARE
Goal Outcome Evaluation:  Plan of Care Reviewed With: patient, caregiver                Anticipated Discharge Disposition (SLP): skilled nursing facility             Treatment Assessment (SLP): continued, oral dysphagia, suspected, pharyngeal dysphagia, toleration of diet (02/21/25 0920)     Plan for Continued Treatment (SLP): continue treatment per plan of care (02/21/25 0920)

## 2025-02-22 LAB
BACTERIA SPEC AEROBE CULT: NORMAL
BACTERIA SPEC AEROBE CULT: NORMAL

## 2025-02-23 LAB
BACTERIA SPEC AEROBE CULT: NORMAL
BACTERIA SPEC AEROBE CULT: NORMAL